# Patient Record
Sex: MALE | Race: WHITE | NOT HISPANIC OR LATINO | Employment: FULL TIME | URBAN - METROPOLITAN AREA
[De-identification: names, ages, dates, MRNs, and addresses within clinical notes are randomized per-mention and may not be internally consistent; named-entity substitution may affect disease eponyms.]

---
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
## 2019-01-02 RX ORDER — BUPROPION HYDROCHLORIDE 150 MG/1
TABLET ORAL
Qty: 30 TABLET | Refills: 3 | Status: SHIPPED | OUTPATIENT
Start: 2019-01-02 | End: 2019-08-30

## 2019-01-23 ENCOUNTER — OFFICE VISIT (OUTPATIENT)
Dept: PEDIATRICS | Facility: CLINIC | Age: 19
End: 2019-01-23
Payer: COMMERCIAL

## 2019-01-23 ENCOUNTER — PATIENT OUTREACH (OUTPATIENT)
Dept: CARE COORDINATION | Facility: CLINIC | Age: 19
End: 2019-01-23

## 2019-01-23 VITALS
WEIGHT: 177.8 LBS | TEMPERATURE: 97.6 F | HEIGHT: 71 IN | OXYGEN SATURATION: 97 % | SYSTOLIC BLOOD PRESSURE: 124 MMHG | DIASTOLIC BLOOD PRESSURE: 60 MMHG | HEART RATE: 88 BPM | BODY MASS INDEX: 24.89 KG/M2

## 2019-01-23 DIAGNOSIS — F90.9 ATTENTION DEFICIT HYPERACTIVITY DISORDER (ADHD), UNSPECIFIED ADHD TYPE: ICD-10-CM

## 2019-01-23 DIAGNOSIS — F60.1 SCHIZOID PERSONALITY DISORDER (H): Primary | ICD-10-CM

## 2019-01-23 DIAGNOSIS — F41.9 ANXIETY: ICD-10-CM

## 2019-01-23 DIAGNOSIS — F32.1 MODERATE MAJOR DEPRESSION (H): ICD-10-CM

## 2019-01-23 DIAGNOSIS — F34.1 DYSTHYMIA: ICD-10-CM

## 2019-01-23 DIAGNOSIS — F33.0 MILD EPISODE OF RECURRENT MAJOR DEPRESSIVE DISORDER (H): ICD-10-CM

## 2019-01-23 PROCEDURE — 99214 OFFICE O/P EST MOD 30 MIN: CPT | Performed by: INTERNAL MEDICINE

## 2019-01-23 RX ORDER — SERTRALINE HYDROCHLORIDE 100 MG/1
100 TABLET, FILM COATED ORAL DAILY
Qty: 30 TABLET | Refills: 3 | Status: SHIPPED | OUTPATIENT
Start: 2019-01-23 | End: 2019-01-23

## 2019-01-23 RX ORDER — QUETIAPINE FUMARATE 25 MG/1
25 TABLET, FILM COATED ORAL
Qty: 14 TABLET | Refills: 1 | Status: SHIPPED | OUTPATIENT
Start: 2019-01-23 | End: 2019-01-23

## 2019-01-23 RX ORDER — DEXTROAMPHETAMINE SACCHARATE, AMPHETAMINE ASPARTATE MONOHYDRATE, DEXTROAMPHETAMINE SULFATE AND AMPHETAMINE SULFATE 7.5; 7.5; 7.5; 7.5 MG/1; MG/1; MG/1; MG/1
30 CAPSULE, EXTENDED RELEASE ORAL DAILY
Qty: 30 CAPSULE | Refills: 0 | Status: SHIPPED | OUTPATIENT
Start: 2019-01-23 | End: 2019-08-30

## 2019-01-23 ASSESSMENT — ANXIETY QUESTIONNAIRES
1. FEELING NERVOUS, ANXIOUS, OR ON EDGE: SEVERAL DAYS
6. BECOMING EASILY ANNOYED OR IRRITABLE: MORE THAN HALF THE DAYS
3. WORRYING TOO MUCH ABOUT DIFFERENT THINGS: SEVERAL DAYS
IF YOU CHECKED OFF ANY PROBLEMS ON THIS QUESTIONNAIRE, HOW DIFFICULT HAVE THESE PROBLEMS MADE IT FOR YOU TO DO YOUR WORK, TAKE CARE OF THINGS AT HOME, OR GET ALONG WITH OTHER PEOPLE: SOMEWHAT DIFFICULT
5. BEING SO RESTLESS THAT IT IS HARD TO SIT STILL: NOT AT ALL
7. FEELING AFRAID AS IF SOMETHING AWFUL MIGHT HAPPEN: NOT AT ALL
GAD7 TOTAL SCORE: 7
2. NOT BEING ABLE TO STOP OR CONTROL WORRYING: SEVERAL DAYS

## 2019-01-23 ASSESSMENT — ACTIVITIES OF DAILY LIVING (ADL): DEPENDENT_IADLS:: INDEPENDENT

## 2019-01-23 ASSESSMENT — MIFFLIN-ST. JEOR: SCORE: 1835.69

## 2019-01-23 ASSESSMENT — PATIENT HEALTH QUESTIONNAIRE - PHQ9
SUM OF ALL RESPONSES TO PHQ QUESTIONS 1-9: 17
5. POOR APPETITE OR OVEREATING: MORE THAN HALF THE DAYS

## 2019-01-23 NOTE — PATIENT INSTRUCTIONS
1) Try the seroquel at before sleep    2) Get in with psychiatry and psychology as we discussed.    Recheck with me if symptoms worsening before appointment with them

## 2019-01-23 NOTE — TELEPHONE ENCOUNTER
Duplicate.     sertraline (ZOLOFT) 100 MG tablet was filled on 1/23/2019, qty 30 with 3 refills.     QUEtiapine (SEROQUEL) 25 MG tablet was filled on 1/23/2018, qty 14 with 1 refills.

## 2019-01-23 NOTE — PROGRESS NOTES
Clinic Care Coordination Contact    Clinic Care Coordination Contact  OUTREACH    Referral Information:  Referral Source: PCP  RN VIVIANA was requested to meet with patient while in clinic to see PCP for follow up appointment    Primary Diagnosis: Behavioral Health    Chief Complaint   Patient presents with     Clinic Care Coordination - Face To Face     coordination of mental health services        Universal Utilization: Concern for No-Show and non-adherence to medical care plan recommendations   Clinic Utilization  Difficulty keeping appointments:: No  Compliance Concerns: Yes  No-Show Concerns: No  No PCP office visit in Past Year: No  Utilization    Last refreshed: 1/23/2019  2:38 AM:  Hospital Admissions 0           Last refreshed: 1/23/2019  2:38 AM:  ED Visits 0           Last refreshed: 1/23/2019  2:38 AM:  No Show Count (past year) 5              Current as of: 1/23/2019  2:38 AM              Clinical Concerns:  Current Medical Concerns:  Patient presented to clinic for scheduled follow up of major depression/medicaiton assessment.   Patient disclosed to provider he has not established care (as recommended) for psychiatry medication management and has not been continuing to go to his therapist with [place] [facility]. Patient is in agreement to re-establish care with mental health providers for both therapy and medication management; currently, he is living with his parents and completing an online college program (19 credits) and working the overnight shift for [employer].     Current Behavioral Concerns: Affect somewhat flat; patient made good eye contact; was smiley and showed cole in talking about his college studies    Education Provided to patient: RN CC reinforced use of Crisis Line and educated patient that [facility] also has a mobile response unit    Psychosocial:  Mental health DX: Yes  Mental health DX how managed: Medication (agreeable to start therapy again)  Mental health management  concern (GOAL): Yes  Informal Support system::Parent     Financial/Insurance:   Financial/Insurance concerns (GOAL): No       Resources and Interventions:  RN CC facilitated call with patient in room to [facility] Providers, was able to establish the following appointments:     1. Psychiatry Medication Management      Wednesday January 30th, 2019 @ 2:00 PM       Jaky Lund      [place], [place]    2. Therapy      [facility]       Monday January 28th, 2019 @ noon      Gelacio Urena       Patient/Caregiver understanding: Patient verbalized understanding, engaged in AIDET communication behavior during encounter.    Outreach Frequency: monthly      Plan:   Patient has establish-care visits with both psychiatry and therapy for next week.   RN CC will outreach in follow up in 2-3 weeks; patient encouraged to call this writer sooner with any new questions/concerns.     Sydni Dean RN   Clinic Care Coordinator-Emily Guerrero  PH: [phone]

## 2019-01-23 NOTE — PROGRESS NOTES
SUBJECTIVE:   Wilfrido Echavarria is a 18 year old male who presents to clinic today for the following health issues:      Depression Followup    Status since last visit: Stable     See PHQ-9 for current symptoms.  Other associated symptoms: None    Complicating factors:   Significant life event:  No   Current substance abuse:  None  Anxiety or Panic symptoms:  No    PHQ 7/12/2018 9/25/2018 11/21/2018   PHQ-9 Total Score 8 13 13   Q9: Suicide Ideation Not at all Not at all Several days     GURMEET-7 SCORE 9/25/2018 11/21/2018 1/23/2019   Total Score 8 6 7           Patient notes that he has been feeling more stressed lately.  He is taking 19 credits from college remotely.  He notes that he is also working at [employer] night shift he tries to sleep but notes that sleep has been challenging.  This is been an ongoing issue for patient.  He is tried Unisom, trazodone and melatonin without help.  Of note he did have a suicide attempt at school last fall with trazodone.  He has not had trazodone right now.  He denies any concerns for suicidal ideation or homicidal ideation.  He denies access to guns or weapons.  He does not feel that decreasing workload is an option for him right now.    He did see a psychologist at [facility] before.  He never did see a psychiatrist.  He continues to be on Adderall 30 mg/day which notes that it helps him focus he is on Zoloft 100 mg/day with this as well.  PHQ-9  English  PHQ-9   Any Language  Suicide Assessment Five-step Evaluation and Treatment (SAFE-T)    Amount of exercise or physical activity: 6-7 days/week for an average of 45-60 minutes    Problems taking medications regularly: No    Medication side effects: none    Diet: regular (no restrictions)        Medication Followup of Adderall    Taking Medication as prescribed: yes    Side Effects:  None    Medication Helping Symptoms:  yes       Problem list and histories reviewed & adjusted, as indicated.  Additional history:  "        Reviewed and updated as needed this visit by clinical staff       Reviewed and updated as needed this visit by Provider         ROS:  Constitutional, HEENT, cardiovascular, pulmonary, GI, , musculoskeletal, neuro, skin, endocrine and psych systems are negative, except as otherwise noted.    OBJECTIVE:     /60 (BP Location: Right arm, Patient Position: Chair, Cuff Size: Adult Regular)   Pulse 88   Temp 97.6  F (36.4  C) (Tympanic)   Ht 1.791 m (5' 10.5\")   Wt 80.6 kg (177 lb 12.8 oz)   SpO2 97%   BMI 25.15 kg/m    Body mass index is 25.15 kg/m .  General patient appears fatigued he is well-groomed in no acute distress HEENT sclera clear mouth is moist pink oral mucosa respiratory breathing regular and unlabored.  Extremities warm and well perfused.  Neurologic no focal deficits no tremor noted.  Psych slightly flat affect slightly poor eye contact but really groomed, seems to have poor insight into current situation with increased workload as well as schooling.    Diagnostic Test Results:  none     ASSESSMENT/PLAN:       ICD-10-CM    1. Schizoid personality disorder (H) F60.1    2. Attention deficit hyperactivity disorder (ADHD), unspecified ADHD type F90.9 amphetamine-dextroamphetamine (ADDERALL XR) 30 MG 24 hr capsule   3. Dysthymia F34.1 MENTAL HEALTH REFERRAL  - Adult; Psychiatry and Medication Management, Outpatient Treatment; Individual/Couples/Family/Group Therapy/Health Psychology; Other: Behavioral Healthcare Providers [phone]; Psychiatry; Other: Behavioral Healthcare...     DISCONTINUED: sertraline (ZOLOFT) 100 MG tablet     DISCONTINUED: QUEtiapine (SEROQUEL) 25 MG tablet   4. Mild episode of recurrent major depressive disorder (H) F33.0      Contracted for safety with patient.  Discussed trial of Seroquel for help with sleep.  This may help with anxiety as well.  Gave only a very small supply as patient did try to overdose in the past on trazodone may refill after 14 days for " another 14-day supply if needed.  Did have care coordination come in and help with assessment of patient as well.  He was scheduled with psychology and psychiatry as noted in their note.  Discussed warning signs for recheck in clinic with patient as well.      Norman Andersen MD, MD  [facility]

## 2019-01-24 RX ORDER — QUETIAPINE FUMARATE 25 MG/1
TABLET, FILM COATED ORAL
Qty: 90 TABLET | Refills: 0 | Status: SHIPPED | OUTPATIENT
Start: 2019-01-24 | End: 2019-11-06

## 2019-01-24 RX ORDER — SERTRALINE HYDROCHLORIDE 100 MG/1
TABLET, FILM COATED ORAL
Qty: 90 TABLET | Refills: 0 | Status: SHIPPED | OUTPATIENT
Start: 2019-01-24 | End: 2019-08-30

## 2019-01-24 ASSESSMENT — ANXIETY QUESTIONNAIRES: GAD7 TOTAL SCORE: 7

## 2019-01-24 NOTE — TELEPHONE ENCOUNTER
Dispense amount changed to dispense 90 days per patient request  Tracie LLOYD RN - Triage  [facility]

## 2019-02-06 ENCOUNTER — PATIENT OUTREACH (OUTPATIENT)
Dept: CARE COORDINATION | Facility: CLINIC | Age: 19
End: 2019-02-06

## 2019-02-06 NOTE — LETTER
[place] [facility]ARE COORDINATION  [address] [place] [address]  February 6, 2019    Wilfrido ELLSWORTH Blood  [address] [address]      Dear Wilfrido,    I am a clinic care coordinator who works with Norman Andersen MD, MD at [facility]. I wanted to thank you for spending the time to talk with me!    I wanted to follow up and share with you some of the information we discussed. Enclosed you will find a couple of local career fair events occurring in February: there are many others coming up throughout the Spring as well! Some other strategies we discussed include creating an online job seeking account by posting your resume to places such as [employer]com or others, as well as consider making a LinkedIn profile.     You're doing a great job: it's encouraging that you've established care with some additional providers and together we will work to support you achieving and sustaining your health and wellness goals.    Please feel free to contact me at [phone], with any questions or concerns. We at [facility] are focused on providing you with the highest-quality healthcare experience possible and that all starts with you.     Sincerely,     Sydni Dean RN   Clinic Care Coordinator-[facility]  PH: [phone]    Enclosed: I have enclosed a copy of the Complex Care Plan. This has helpful information and goals that we have talked about. Please keep this in an easy to access place to use as needed.

## 2019-02-06 NOTE — LETTER
[facility]  Complex Care Plan  About Me  Patient Name:  Wilfrido Echavarria    YOB: 2000  Age:     18 year old   [facility] MRN:   [id number] Telephone Information:  Home Phone [phone]   Mobile [phone]       Address:    [address] [place] [address] Email address:  No e-mail address on record      Emergency Contact(s)  Name Relationship Lgl Grd Work Phone Home Phone Mobile Phone   1Lyn MENCHACA* Mother Yes  [phone]           Primary language:  English     needed? No   [facility]:  [phone] op. 1  Other communication barriers: None  Preferred Method of Communication:  Mail  Current living arrangement:    Mobility Status/ Medical Equipment:      Health Maintenance  Health Maintenance Reviewed:      My Access Plan  Medical Emergency 911   Primary Clinic Line [facility] [place]  [phone]   24 Hour Appointment Line [phone] or  [phone]) (toll-free)   24 Hour Nurse Line [phone] (toll-free)   Preferred Urgent Care     Preferred Hospital     Preferred Pharmacy [facility] [address] [place], [place] - [address] AT [place] [address] & [address]     Behavioral Health Crisis Line The National Suicide Prevention Lifeline at 1-579.932.2024 or 911     My Care Team Members    Patient Care Team       Relationship Specialty Notifications Start End    Norman Andersen MD PCP - General Internal Medicine  11/27/16     Phone: [phone] Fax: [phone]         [address] [place] [address] [address] [place] [id number]    Norman Andersen MD PCP - Assigned PCP   2/13/17     Phone: [phone] Fax: [phone]         [address] [place] [address] [address] [place] [id number]    Sydni Dean, RN Lead Care Coordinator Primary Care - CC  1/30/19             My Care Plans  Self Management and Treatment Plan  Goals and (Comments)  Goals        General    Other (pt-stated)     Notes - Note created  2/6/2019   3:37 PM by Sydni Dean RN    Goal Statement: I want to achieve an improved sleep pattern so that I can get at least 6 hours of uninterrupted sleep and feel more restful and focused  Measure of Success: Patient stated ability to fall asleep and stay asleep consistently for at least a 6 hour interval of time  Supportive Steps to Achieve: I will consider looking for employment that allows me to work a day shift versus an overnight shift      I will discuss sleep medication strategies with my medical providers  Barriers: Patient currently works overnight shift and is also completing online school, which has caused disruption in consistent sleep pattern  Strengths: Engaged in healthcare decisions, receptive to RN CC outreach and support, willing to make lifestyle changes (including alternative employment)  Date to Achieve By: 3 months: May 6th, 2019                               Care Coordination Start Date: 2/6/2019   Frequency of Care Coordination: monthly   Form Last Updated: 02/06/2019

## 2019-02-06 NOTE — PROGRESS NOTES
"Clinic Care Coordination Contact    Clinic Care Coordination Contact  OUTREACH    Referral Information:  Referred by PCP for assistance in establishing and navigating follow up to support mental wellness    Primary Diagnosis: Behavioral Health    Chief Complaint   Patient presents with     Clinic Care Coordination - Follow-up     mental health referral        Universal Utilization:   Clinic Utilization  Difficulty keeping appointments: No  Compliance Concerns: Yes  No-Show Concerns: No  No PCP office visit in Past Year: No  Utilization    Last refreshed: 1/25/2019 10:56 AM:  Hospital Admissions 0           Last refreshed: 1/25/2019 10:56 AM:  ED Visits 0           Last refreshed: 1/25/2019 10:56 AM:  No Show Count (past year) 5              Current as of: 1/25/2019 10:56 AM              Clinical Concerns:  Current Medical Concerns:    RN CC initially met with patient face to face during scheduled office visit with goal of establishing mental health follow up care; RN CC outreached in follow up to assess outcome of referrals; the patient indicates he was able to complete his establish-care visit with Jaky Lund (at [facility] for medication management) but offers \"it was kind of weird, she wasn't so sure why I was there, but it (the visit) did go ok. She increased my Zoloft for now and the other medications (Wellbutrin and Adderall) will stay the same\". The patient reports genesight testing was presented to him, and he would love to pursue this, but it is not presently covered by his insurance company and the private pay cost is $1,000.00 which is not financially feasible.    The patient is reporting he continues to have very poor sleep, citing that he works the overnight shift at [employer], and is scheduled to end his shift around 0330 but lately it has extended far beyond that to 0500 or 0600 AM, and then he sleeps from \"about noon until 2 or so\"; the patient offers falling asleep is not an issue, but " "staying asleep is difficult, which in turn, has caused \"focus issues with my school work, even with the Adderall\".       RN CC inquired if patient would consider altering his work schedule to the daytime shift, and patient indicated he would do this, but his current employer is not willing to change his shifts, as he is one of the newest employees.  RN CC discussed strategies to look for new employment: stating there are career fairs coming up, encouraged the patient to create a resume and LinkedIn profile and market his skill set in hopes of achieving employment that is better suited to a routine that promotes effective sleep and focus for school work. Patient was receptive and appreciative of the discussion.      Regarding establishment of therapy appointment, the patient states that he missed his appointment at [facility] but has rescheduled for Friday, February 8th @ 2:00 pm.     Current Behavioral Concerns:   Affect bright, patient cheerful and interactive; very thankful for the outreach call      Medication Management:  Patient reports he recently saw Chichi Lund at [facility] for medication management: she increased his Zoloft and he remains on the same dose of Wellbutrin and Adderall; the patient is also prescribed Seroquel, but went on to state \"I was prescribed that to help promote sleep but I've been reading the side effects and quite honestly, I am hesitant to start it\".  RN CC encouraged patient to discuss this at his next follow up with PCP and/or Chichi Lund (psychiatry medication management provider); in the interim, RN CC educated patient that if he does decide to start the medicatoin, it is important that he not abruptly stop, and rather if he wishes to stop any of his medications, he should contact the ordering provider for discussion and advice on tapering strategies. Patient was receptive and verbalized understanding and agreement regarding this discussion.       Goals: "   Goals        General    Other (pt-stated)     Notes - Note created  2/6/2019  3:37 PM by Sydni Dean RN    Goal Statement: I want to achieve an improved sleep pattern so that I can get at least 6 hours of uninterrupted sleep and feel more restful and focused  Measure of Success: Patient stated ability to fall asleep and stay asleep consistently for at least a 6 hour interval of time  Supportive Steps to Achieve: I will consider looking for employment that allows me to work a day shift versus an overnight shift      I will discuss sleep medication strategies with my medical providers  Barriers: Patient currently works overnight shift and is also completing online school, which has caused disruption in consistent sleep pattern  Strengths: Engaged in healthcare decisions, receptive to RN CC outreach and support, willing to make lifestyle changes (including alternative employment)  Date to Achieve By: 3 months: May 6th, 2019              As of today's date 2/6/2019 goal is met at 0 - 25%.   Goal Status:  Active    Patient/Caregiver understanding: Patient verbalized understanding, engaged in AIDET communication behavior during encounter.    Outreach Frequency: monthly      Plan:   RN CC will mail Introduction to Care Coordination letter and will include Complex Care Plan   RN CC will also include material for patient to review regarding area job fairs: there is one on February 12th and February 20th  Patient encouraged to discuss sleep medication strategies at follow up office visit  RN CC will outreach again in follow up in 3-4 weeks; patient encouraged to call sooner with questions or concerns.     Sydni Dean RN   Clinic Care Coordinator-[facility]  PH: [phone]

## 2019-03-07 ENCOUNTER — PATIENT OUTREACH (OUTPATIENT)
Dept: CARE COORDINATION | Facility: CLINIC | Age: 19
End: 2019-03-07

## 2019-03-07 NOTE — PROGRESS NOTES
Clinic Care Coordination Contact  [facility]/Voicemail    RN CC follow-up outreach: update on mental health resources/providers, job and school status.      Clinical Data: Care Coordinator Outreach  Outreach attempted x 1.  Left message on voicemail with call back information and requested return call.  Plan: Care Coordinator will try to reach patient again in 3-5 business days.    Sydni Dean RN   Clinic Care Coordinator-[facility]  PH: [phone]

## 2019-03-15 NOTE — PROGRESS NOTES
Clinic Care Coordination Contact  [facility]/Voicemail       Clinical Data: Care Coordinator Outreach  Outreach attempted x 2.  Left message on voicemail with call back information and requested return call.  Plan: Care Coordinator mailed out care coordination introduction letter on 02/06/2019. Care Coordinator will try to reach patient again in 10-14 business days. If no response at that time, will close to Care Coordination.    Sydni Dean RN   [facility]  PH: [phone]

## 2019-04-02 ENCOUNTER — OFFICE VISIT (OUTPATIENT)
Dept: URGENT CARE | Facility: URGENT CARE | Age: 19
End: 2019-04-02
Payer: COMMERCIAL

## 2019-04-02 VITALS
DIASTOLIC BLOOD PRESSURE: 90 MMHG | SYSTOLIC BLOOD PRESSURE: 122 MMHG | BODY MASS INDEX: 25.04 KG/M2 | WEIGHT: 177 LBS | RESPIRATION RATE: 20 BRPM | HEART RATE: 89 BPM | OXYGEN SATURATION: 98 % | TEMPERATURE: 98.4 F

## 2019-04-02 DIAGNOSIS — J01.90 ACUTE SINUSITIS WITH COEXISTING CONDITION, NEED PROPHYLACTIC TREATMENT: Primary | ICD-10-CM

## 2019-04-02 PROCEDURE — 99213 OFFICE O/P EST LOW 20 MIN: CPT | Performed by: FAMILY MEDICINE

## 2019-04-02 RX ORDER — AZITHROMYCIN 250 MG/1
TABLET, FILM COATED ORAL
Qty: 6 TABLET | Refills: 0 | Status: SHIPPED | OUTPATIENT
Start: 2019-04-02 | End: 2019-04-07

## 2019-04-02 NOTE — LETTER
[facility]  [address]  [address]  [place] [address]  Phone: [phone]  Fax: [phone]    April 2, 2019        Wilfrido ELLSWORTH Blood  [address] [address]          To whom it may concern:    RE: Wilfrido ELLSWORTH Blood    Patient was seen and treated today at our clinic and missed work. Unable to work on 4/1 and 4/2/19.    Please contact me for questions or concerns.      Sincerely,        Ellis Balbuena MD

## 2019-04-03 NOTE — PROGRESS NOTES
SUBJECTIVE: Wilfrido Echavarria is a 18 year old male patient complaining of sinus congestion for many day(s).     OBJECTIVE: The patient appears alert and moderate distress.   EARS: External ears normal. Canals clear. TM's normal.  NOSE/SINUS: positive findings: mucosa erythematous and swollen  Sinus palpation: Maxillary sinus tender to palpation   THROAT: moderate erythema   NECK:positive findings: moderate anterior cervical nodes   CHEST: Clear    ASSESSMENT: Acute Sinusitis    PLAN: See orders.   In addition, I have suggested that the patient   Push fluids.

## 2019-04-08 NOTE — PROGRESS NOTES
Clinic Care Coordination Contact  [facility]/Voicemail       Clinical Data: Care Coordinator Outreach  Outreach attempted x 3.  Left message on voicemail with call back information and requested return call.  Plan: Care Coordinator mailed out care coordination introduction letter on 02/06/2019. Care Coordinator will do no further outreaches at this time.    Sydni Dean RN   Clinic Care Coordinator-[facility]  PH: [phone]

## 2019-08-30 ENCOUNTER — OFFICE VISIT (OUTPATIENT)
Dept: FAMILY MEDICINE | Facility: CLINIC | Age: 19
End: 2019-08-30
Payer: COMMERCIAL

## 2019-08-30 VITALS
DIASTOLIC BLOOD PRESSURE: 68 MMHG | WEIGHT: 199.3 LBS | BODY MASS INDEX: 28.19 KG/M2 | TEMPERATURE: 98.2 F | HEART RATE: 84 BPM | SYSTOLIC BLOOD PRESSURE: 110 MMHG | RESPIRATION RATE: 16 BRPM

## 2019-08-30 DIAGNOSIS — M22.2X1 PATELLOFEMORAL SYNDROME OF RIGHT KNEE: Primary | ICD-10-CM

## 2019-08-30 DIAGNOSIS — F90.9 ATTENTION DEFICIT HYPERACTIVITY DISORDER (ADHD), UNSPECIFIED ADHD TYPE: ICD-10-CM

## 2019-08-30 DIAGNOSIS — F34.1 DYSTHYMIA: ICD-10-CM

## 2019-08-30 DIAGNOSIS — F33.0 MILD EPISODE OF RECURRENT MAJOR DEPRESSIVE DISORDER (H): ICD-10-CM

## 2019-08-30 PROCEDURE — 99214 OFFICE O/P EST MOD 30 MIN: CPT | Performed by: NURSE PRACTITIONER

## 2019-08-30 RX ORDER — SERTRALINE HYDROCHLORIDE 100 MG/1
100 TABLET, FILM COATED ORAL DAILY
Qty: 30 TABLET | Refills: 2 | Status: SHIPPED | OUTPATIENT
Start: 2019-08-30 | End: 2019-08-30

## 2019-08-30 RX ORDER — BUPROPION HYDROCHLORIDE 150 MG/1
TABLET ORAL
Qty: 30 TABLET | Refills: 2 | Status: SHIPPED | OUTPATIENT
Start: 2019-08-30 | End: 2019-08-30

## 2019-08-30 RX ORDER — DEXTROAMPHETAMINE SACCHARATE, AMPHETAMINE ASPARTATE MONOHYDRATE, DEXTROAMPHETAMINE SULFATE AND AMPHETAMINE SULFATE 7.5; 7.5; 7.5; 7.5 MG/1; MG/1; MG/1; MG/1
30 CAPSULE, EXTENDED RELEASE ORAL DAILY
Qty: 30 CAPSULE | Refills: 0 | Status: SHIPPED | OUTPATIENT
Start: 2019-08-30 | End: 2022-11-21

## 2019-08-30 ASSESSMENT — ANXIETY QUESTIONNAIRES
2. NOT BEING ABLE TO STOP OR CONTROL WORRYING: NOT AT ALL
GAD7 TOTAL SCORE: 2
4. TROUBLE RELAXING: MORE THAN HALF THE DAYS
3. WORRYING TOO MUCH ABOUT DIFFERENT THINGS: NOT AT ALL
GAD7 TOTAL SCORE: 2
7. FEELING AFRAID AS IF SOMETHING AWFUL MIGHT HAPPEN: NOT AT ALL
1. FEELING NERVOUS, ANXIOUS, OR ON EDGE: NOT AT ALL
5. BEING SO RESTLESS THAT IT IS HARD TO SIT STILL: NOT AT ALL
7. FEELING AFRAID AS IF SOMETHING AWFUL MIGHT HAPPEN: NOT AT ALL
GAD7 TOTAL SCORE: 2
6. BECOMING EASILY ANNOYED OR IRRITABLE: NOT AT ALL

## 2019-08-30 ASSESSMENT — PATIENT HEALTH QUESTIONNAIRE - PHQ9
SUM OF ALL RESPONSES TO PHQ QUESTIONS 1-9: 11
10. IF YOU CHECKED OFF ANY PROBLEMS, HOW DIFFICULT HAVE THESE PROBLEMS MADE IT FOR YOU TO DO YOUR WORK, TAKE CARE OF THINGS AT HOME, OR GET ALONG WITH OTHER PEOPLE: SOMEWHAT DIFFICULT
SUM OF ALL RESPONSES TO PHQ QUESTIONS 1-9: 11

## 2019-08-30 NOTE — NURSING NOTE
"Chief Complaint   Patient presents with     Depression     Initial /68 (BP Location: Right arm, Patient Position: Sitting, Cuff Size: Adult Large)   Pulse 84   Temp 98.2  F (36.8  C) (Oral)   Resp 16   Wt 90.4 kg (199 lb 4.8 oz)   BMI 28.19 kg/m   Estimated body mass index is 28.19 kg/m  as calculated from the following:    Height as of 1/23/19: 1.791 m (5' 10.5\").    Weight as of this encounter: 90.4 kg (199 lb 4.8 oz).  BP completed using cuff size large right arm    Lisa Magill, CMA    "

## 2019-08-30 NOTE — PROGRESS NOTES
Subjective     Wilfrido Echavarria is a 19 year old male who presents to clinic today for the following health issues:    History of Present Illness        Mental Health Follow-up:  Patient presents to follow-up on Depression.Patient's depression since last visit has been:  Good  The patient is not having other symptoms associated with depression.      Any significant life events: relationship concerns and job concerns  Patient is not feeling anxious or having panic attacks.  Patient has no concerns about alcohol or drug use.     Social History  Tobacco Use    Smoking status: Never Smoker    Smokeless tobacco: Never Used  Alcohol use: No  Drug use: No      Today's PHQ-9         PHQ-9 Total Score:     (P) 11   PHQ-9 Q9 Thoughts of better off dead/self-harm past 2 weeks :   (P) Not at all   Thoughts of suicide or self harm:      Self-harm Plan:        Self-harm Action:          Safety concerns for self or others:         Last seen by his PCP in January and he was referred to psych for med management.     He went to [facility] once and didn't like the provider he saw there.   There are notes in his chart that mention at the [place] appt his zoloft dose was increased, but he reports he never increased his dose.  He has been off meds since early this year.   He stopped his medications because he was no longer in school and his work was fast paced.   He is now working at [employer] working in the [employer] and had an interview for the [employer] last week.   Took last year off school, but is now back in school for IT.    Started school last week and having a hard time focusing.   Day dreaming a lot.   He reports focus was much improved when he was taking adderall.  He could actually finish his homework.   Mood is stable, feeling really good for him.    He denies SI or concerns for his safety or the safety of others.   No issues with anxiety.   No longer doing therapy; found this helpful but doesn't have time to do this  right now.   Sleep has always been an issue for him.    In the past had a suicide attempt with trazodone.   His PCP wanted him to try a low dose of seroquel for sleep, but he read about side effects and found those to be too concerning and never tried it.    R knee has been bothering him.   Running, bending, and walking downstairs are painful.  Really painful when he does a leg workout and likes to do squat 300+ pounds.       GURMEET-7 SCORE 11/21/2018 1/23/2019 8/30/2019   Total Score - - 2 (minimal anxiety)   Total Score 6 7 2     PHQ-9 SCORE 11/21/2018 1/23/2019 8/30/2019   PHQ-9 Total Score MyChart - - 11 (Moderate depression)   PHQ-9 Total Score 13 17 11               Current Outpatient Medications   Medication Sig Dispense Refill     amphetamine-dextroamphetamine (ADDERALL XR) 30 MG 24 hr capsule Take 1 capsule (30 mg) by mouth daily 30 capsule 0     buPROPion (WELLBUTRIN XL) 150 MG 24 hr tablet TAKE 1 TABLET(150 MG) BY MOUTH EVERY MORNING 30 tablet 2     Multiple Vitamins-Minerals (MULTIVITAMIN PO)        Omega-3 Fatty Acids (FISH OIL PO)        sertraline (ZOLOFT) 100 MG tablet Take 1 tablet (100 mg) by mouth daily Take 1/2 tab x 1 week, then start one tab daily 30 tablet 2     QUEtiapine (SEROQUEL) 25 MG tablet TAKE 1 TABLET(25 MG) BY MOUTH EVERY NIGHT AS NEEDED FOR INSOMNIA (Patient not taking: Reported on 8/30/2019) 90 tablet 0     No Known Allergies      Reviewed and updated as needed this visit by Provider         Review of Systems   ROS COMP: Constitutional, HEENT, cardiovascular, pulmonary, gi and gu and psych systems are negative, except as otherwise noted.      Objective    /68 (BP Location: Right arm, Patient Position: Sitting, Cuff Size: Adult Large)   Pulse 84   Temp 98.2  F (36.8  C) (Oral)   Resp 16   Wt 90.4 kg (199 lb 4.8 oz)   BMI 28.19 kg/m    Body mass index is 28.19 kg/m .  Physical Exam   GENERAL: healthy, alert and no distress  NECK: no adenopathy, no asymmetry, masses, or scars  and thyroid normal to palpation  RESP: lungs clear to auscultation - no rales, rhonchi or wheezes  CV: regular rate and rhythm, normal S1 S2, no S3 or S4, no murmur, click or rub, no peripheral edema and peripheral pulses strong  MS: no gross musculoskeletal defects noted, no edema, R knee: FROM, no tenderness or swelling   SKIN: no suspicious lesions or rashes  PSYCH: mentation appears normal, affect normal/bright, judgement and insight intact and appearance well groomed    Diagnostic Test Results:  none         Assessment & Plan     1. Dysthymia  Restarting today.   - sertraline (ZOLOFT) 100 MG tablet; Take 1 tablet (100 mg) by mouth daily Take 1/2 tab x 1 week, then start one tab daily  Dispense: 30 tablet; Refill: 2    2. Mild episode of recurrent major depressive disorder (H)  Restarting today. Consider getting back in therapy.  Discussed trying seroquel for sleep, but seems hesitant.  Will see if sleep improves when he is back on medications.   - buPROPion (WELLBUTRIN XL) 150 MG 24 hr tablet; TAKE 1 TABLET(150 MG) BY MOUTH EVERY MORNING  Dispense: 30 tablet; Refill: 2    3. Attention deficit hyperactivity disorder (ADHD), unspecified ADHD type   reviewed; last refilled in January.  Discussed need for same provider for refills.  Will need to do controlled substance agreement.   - amphetamine-dextroamphetamine (ADDERALL XR) 30 MG 24 hr capsule; Take 1 capsule (30 mg) by mouth daily  Dispense: 30 capsule; Refill: 0    4. Patellofemoral syndrome of right knee  Rest, ice, NSAIDs.           Return in about 3 months (around 11/30/2019) for Mental Health Follow up.    SELMA Ochoa [facility]

## 2019-08-31 ASSESSMENT — ANXIETY QUESTIONNAIRES: GAD7 TOTAL SCORE: 2

## 2019-09-03 NOTE — TELEPHONE ENCOUNTER
"Requested Prescriptions   Pending Prescriptions Disp Refills     sertraline (ZOLOFT) 100 MG tablet [Pharmacy Med Name: SERTRALINE 100MG TABLETS] 90 tablet 2     Sig: TAKE 1/2 TO BY MOUTH EVERY DAY FOR 1 WEEK, THEN INCREASE TO 1 FULL TABLET DAILY   Last Written Prescription Date:  8/30/19  Last Fill Quantity: 30,  # refills: 2   Last Office Visit: 8/30/2019 Strong      Return in about 3 months (around 11/30/2019) for Mental Health Follow up.     Future Office Visit:         SSRIs Protocol Failed - 8/30/2019  4:49 PM        Failed - PHQ-9 score less than 5 in past 6 months     PHQ-9 SCORE 11/21/2018 1/23/2019 8/30/2019   PHQ-9 Total Score MyChart - - 11 (Moderate depression)   PHQ-9 Total Score 13 17 11     GURMEET-7 SCORE 11/21/2018 1/23/2019 8/30/2019   Total Score - - 2 (minimal anxiety)   Total Score 6 7 2               Passed - Medication is Bupropion     If the medication is Bupropion (Wellbutrin), and the patient is taking for smoking cessation; OK to refill.          Passed - Medication is active on med list        Passed - Patient is age 18 or older        Passed - Recent (6 mo) or future (30 days) visit within the authorizing provider's specialty     Patient had office visit in the last 6 months or has a visit in the next 30 days with authorizing provider or within the authorizing provider's specialty.  See \"Patient Info\" tab in inbasket, or \"Choose Columns\" in Meds & Orders section of the refill encounter.       ________________________________________________________________________       buPROPion (WELLBUTRIN XL) 150 MG 24 hr tablet [Pharmacy Med Name: BUPROPION XL 150MG TABLETS (24 H)] 90 tablet 2     Sig: TAKE 1 TABLET(150 MG) BY MOUTH EVERY MORNING   Last Written Prescription Date:  8/30/19  Last Fill Quantity: 30,  # refills: 2   Last Office Visit: 8/30/2019   Future Office Visit:         SSRIs Protocol Failed - 8/30/2019  4:49 PM        Failed - PHQ-9 score less than 5 in past 6 months     PHQ-9 SCORE " "11/21/2018 1/23/2019 8/30/2019   PHQ-9 Total Score MyChart - - 11 (Moderate depression)   PHQ-9 Total Score 13 17 11     GURMEET-7 SCORE 11/21/2018 1/23/2019 8/30/2019   Total Score - - 2 (minimal anxiety)   Total Score 6 7 2               Passed - Medication is Bupropion     If the medication is Bupropion (Wellbutrin), and the patient is taking for smoking cessation; OK to refill.          Passed - Medication is active on med list        Passed - Patient is age 18 or older        Passed - Recent (6 mo) or future (30 days) visit within the authorizing provider's specialty     Patient had office visit in the last 6 months or has a visit in the next 30 days with authorizing provider or within the authorizing provider's specialty.  See \"Patient Info\" tab in inbasket, or \"Choose Columns\" in Meds & Orders section of the refill encounter.            "

## 2019-09-04 RX ORDER — BUPROPION HYDROCHLORIDE 150 MG/1
TABLET ORAL
Qty: 90 TABLET | Refills: 0 | Status: SHIPPED | OUTPATIENT
Start: 2019-09-04 | End: 2019-11-06

## 2019-09-04 RX ORDER — SERTRALINE HYDROCHLORIDE 100 MG/1
TABLET, FILM COATED ORAL
Qty: 90 TABLET | Refills: 0 | Status: SHIPPED | OUTPATIENT
Start: 2019-09-04 | End: 2019-11-06

## 2019-09-04 NOTE — TELEPHONE ENCOUNTER
Pt requesting 90 day supply of meds  Had also requested 2 refills which I removed since he is due back in 3 months    Vanda French RN, BS  Clinical Nurse Triage.

## 2019-11-06 ENCOUNTER — OFFICE VISIT (OUTPATIENT)
Dept: FAMILY MEDICINE | Facility: CLINIC | Age: 19
End: 2019-11-06
Payer: COMMERCIAL

## 2019-11-06 VITALS
HEIGHT: 71 IN | SYSTOLIC BLOOD PRESSURE: 110 MMHG | HEART RATE: 71 BPM | TEMPERATURE: 98.5 F | DIASTOLIC BLOOD PRESSURE: 68 MMHG | WEIGHT: 198 LBS | OXYGEN SATURATION: 99 % | RESPIRATION RATE: 16 BRPM | BODY MASS INDEX: 27.72 KG/M2

## 2019-11-06 DIAGNOSIS — G89.29 CHRONIC PAIN OF RIGHT KNEE: Primary | ICD-10-CM

## 2019-11-06 DIAGNOSIS — F90.9 ATTENTION DEFICIT HYPERACTIVITY DISORDER (ADHD), UNSPECIFIED ADHD TYPE: ICD-10-CM

## 2019-11-06 DIAGNOSIS — M25.561 CHRONIC PAIN OF RIGHT KNEE: Primary | ICD-10-CM

## 2019-11-06 DIAGNOSIS — F32.1 MODERATE MAJOR DEPRESSION (H): ICD-10-CM

## 2019-11-06 PROCEDURE — 99214 OFFICE O/P EST MOD 30 MIN: CPT | Performed by: NURSE PRACTITIONER

## 2019-11-06 RX ORDER — DEXTROAMPHETAMINE SACCHARATE, AMPHETAMINE ASPARTATE MONOHYDRATE, DEXTROAMPHETAMINE SULFATE AND AMPHETAMINE SULFATE 7.5; 7.5; 7.5; 7.5 MG/1; MG/1; MG/1; MG/1
30 CAPSULE, EXTENDED RELEASE ORAL DAILY
Qty: 30 CAPSULE | Refills: 0 | Status: SHIPPED | OUTPATIENT
Start: 2019-12-07 | End: 2020-01-06

## 2019-11-06 RX ORDER — DEXTROAMPHETAMINE SACCHARATE, AMPHETAMINE ASPARTATE MONOHYDRATE, DEXTROAMPHETAMINE SULFATE AND AMPHETAMINE SULFATE 7.5; 7.5; 7.5; 7.5 MG/1; MG/1; MG/1; MG/1
30 CAPSULE, EXTENDED RELEASE ORAL DAILY
Qty: 30 CAPSULE | Refills: 0 | Status: CANCELLED | OUTPATIENT
Start: 2019-11-06

## 2019-11-06 RX ORDER — DEXTROAMPHETAMINE SACCHARATE, AMPHETAMINE ASPARTATE MONOHYDRATE, DEXTROAMPHETAMINE SULFATE AND AMPHETAMINE SULFATE 7.5; 7.5; 7.5; 7.5 MG/1; MG/1; MG/1; MG/1
30 CAPSULE, EXTENDED RELEASE ORAL DAILY
Qty: 30 CAPSULE | Refills: 0 | Status: SHIPPED | OUTPATIENT
Start: 2019-11-06 | End: 2019-12-06

## 2019-11-06 ASSESSMENT — PATIENT HEALTH QUESTIONNAIRE - PHQ9: SUM OF ALL RESPONSES TO PHQ QUESTIONS 1-9: 0

## 2019-11-06 ASSESSMENT — MIFFLIN-ST. JEOR: SCORE: 1922.31

## 2019-11-06 NOTE — PROGRESS NOTES
Subjective     Wilfrido Echavarria is a 19 year old male who presents to clinic today for the following health issues:    HPI   Medication Followup of Adderall    Taking Medication as prescribed: yes    Side Effects:  None    Medication Helping Symptoms:  yes   Last seen in the clinic in August.  Adderall was last filled on 8/31/19 with a 30 day supply. He never takes adderall on the wknds and only when he has school.  He had to be off adderall for a time period in order to enlist with  and is now able to restart medication again.  He is leaving Jan 1, 2020 for [place]; he is joining the [employer].  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  Denies side effects to adderall.  He feels mood is well controlled; no longer on medication.  He reports anxiety has been absent.  No concerns about sleep.  He denies SI/HI and substance abuse.     Continues to have pain in the R knee.  Stopped lifting/squatting.  He has been doing stair stepper.  Pain is worse if he pushes on his knee cap.        Current Outpatient Medications   Medication Sig Dispense Refill     amphetamine-dextroamphetamine (ADDERALL XR) 30 MG 24 hr capsule Take 1 capsule (30 mg) by mouth daily 30 capsule 0     [START ON 12/7/2019] amphetamine-dextroamphetamine (ADDERALL XR) 30 MG 24 hr capsule Take 1 capsule (30 mg) by mouth daily 30 capsule 0     amphetamine-dextroamphetamine (ADDERALL XR) 30 MG 24 hr capsule Take 1 capsule (30 mg) by mouth daily 30 capsule 0     Multiple Vitamins-Minerals (MULTIVITAMIN PO)        Omega-3 Fatty Acids (FISH OIL PO)        No Known Allergies    Reviewed and updated as needed this visit by Provider  Meds         Review of Systems   ROS COMP: Constitutional, HEENT, cardiovascular, pulmonary, gi and gu and psych systems are negative, except as otherwise noted.      Objective    /68 (BP Location: Right arm, Patient Position: Sitting, Cuff Size: Adult Large)   Pulse 71   Temp 98.5  F (36.9  C) (Oral)   Resp 16   Ht  "1.791 m (5' 10.5\")   Wt 89.8 kg (198 lb)   SpO2 99%   BMI 28.01 kg/m    Body mass index is 28.01 kg/m .  Physical Exam   GENERAL: healthy, alert and no distress  EYES: Eyes grossly normal to inspection, PERRL and conjunctivae and sclerae normal  NECK: no adenopathy, no asymmetry, masses, or scars and thyroid normal to palpation  RESP: lungs clear to auscultation - no rales, rhonchi or wheezes  CV: regular rate and rhythm, normal S1 S2, no S3 or S4, no murmur, click or rub  ABDOMEN: soft, nontender, no hepatosplenomegaly, no masses and bowel sounds normal  MS: no gross musculoskeletal defects noted  PSYCH: mentation appears normal, affect normal/bright      Diagnostic Test Results:  Labs reviewed in Epic        Assessment & Plan     1. Attention deficit hyperactivity disorder (ADHD), unspecified ADHD type   reviewed.  Refilled for him today.  Will be in joining the  in Jan and living in [place] for at least a year.  Will need local provider to provide next set of refills.   - amphetamine-dextroamphetamine (ADDERALL XR) 30 MG 24 hr capsule; Take 1 capsule (30 mg) by mouth daily  Dispense: 30 capsule; Refill: 0  - amphetamine-dextroamphetamine (ADDERALL XR) 30 MG 24 hr capsule; Take 1 capsule (30 mg) by mouth daily  Dispense: 30 capsule; Refill: 0    2. Chronic pain of right knee  Refer.   - ORTHO  REFERRAL    3. Moderate major depression (H)  He decided to stop medications.  Reports mood feels well controlled.  Updated PHQ today.  PHQ-9 SCORE 1/23/2019 8/30/2019 11/6/2019   PHQ-9 Total Score MyChart - 11 (Moderate depression) -   PHQ-9 Total Score 17 11 0              Return in about 3 months (around 2/6/2020) for adhd .--if he is still in [place]     SELMA Ochoa [facility]"

## 2020-03-02 ENCOUNTER — OFFICE VISIT (OUTPATIENT)
Dept: FAMILY MEDICINE | Facility: CLINIC | Age: 20
End: 2020-03-02
Payer: COMMERCIAL

## 2020-03-02 VITALS
HEART RATE: 66 BPM | OXYGEN SATURATION: 99 % | BODY MASS INDEX: 27.16 KG/M2 | TEMPERATURE: 98.1 F | DIASTOLIC BLOOD PRESSURE: 76 MMHG | WEIGHT: 192 LBS | SYSTOLIC BLOOD PRESSURE: 120 MMHG | RESPIRATION RATE: 12 BRPM

## 2020-03-02 DIAGNOSIS — S29.011A PECTORALIS MUSCLE RUPTURE, INITIAL ENCOUNTER: ICD-10-CM

## 2020-03-02 DIAGNOSIS — F90.9 ATTENTION DEFICIT HYPERACTIVITY DISORDER (ADHD), UNSPECIFIED ADHD TYPE: Primary | ICD-10-CM

## 2020-03-02 DIAGNOSIS — F32.1 MODERATE MAJOR DEPRESSION (H): ICD-10-CM

## 2020-03-02 PROCEDURE — 99214 OFFICE O/P EST MOD 30 MIN: CPT | Performed by: FAMILY MEDICINE

## 2020-03-02 RX ORDER — DEXTROAMPHETAMINE SACCHARATE, AMPHETAMINE ASPARTATE MONOHYDRATE, DEXTROAMPHETAMINE SULFATE AND AMPHETAMINE SULFATE 7.5; 7.5; 7.5; 7.5 MG/1; MG/1; MG/1; MG/1
30 CAPSULE, EXTENDED RELEASE ORAL DAILY
Qty: 30 CAPSULE | Refills: 0 | Status: SHIPPED | OUTPATIENT
Start: 2020-03-02 | End: 2020-04-01

## 2020-03-02 RX ORDER — DEXTROAMPHETAMINE SACCHARATE, AMPHETAMINE ASPARTATE MONOHYDRATE, DEXTROAMPHETAMINE SULFATE AND AMPHETAMINE SULFATE 7.5; 7.5; 7.5; 7.5 MG/1; MG/1; MG/1; MG/1
30 CAPSULE, EXTENDED RELEASE ORAL DAILY
Qty: 30 CAPSULE | Refills: 0 | Status: SHIPPED | OUTPATIENT
Start: 2020-04-02 | End: 2020-05-02

## 2020-03-02 RX ORDER — DEXTROAMPHETAMINE SACCHARATE, AMPHETAMINE ASPARTATE MONOHYDRATE, DEXTROAMPHETAMINE SULFATE AND AMPHETAMINE SULFATE 7.5; 7.5; 7.5; 7.5 MG/1; MG/1; MG/1; MG/1
30 CAPSULE, EXTENDED RELEASE ORAL DAILY
Qty: 30 CAPSULE | Refills: 0 | Status: SHIPPED | OUTPATIENT
Start: 2020-05-03 | End: 2020-06-02

## 2020-03-02 ASSESSMENT — ENCOUNTER SYMPTOMS
NERVOUS/ANXIOUS: 0
HEADACHES: 0
ABDOMINAL PAIN: 0
CONSTITUTIONAL NEGATIVE: 1
PALPITATIONS: 0
SLEEP DISTURBANCE: 0

## 2020-03-02 NOTE — PROGRESS NOTES
Subjective     Wilfrido Echavarria is a 19 year old male who presents to clinic today for the following health issues:    HPI   Medication Followup of Adderral    Taking Medication as prescribed: NO    Side Effects:  None    Medication Helping Symptoms:  yes     Orders changed, will be leaving for [place] in July.  Still in the area.  Has been using medication since highschool  No concerns with medication.  No issues w/side effects.  Sleep is OK, no issues with mood.    Notes was bench pressing more weight than he shold have 4 days ago, felt a sharp pain in L pectoralis.  Has been having marked pain since then, affecting sleep.  Deep breaths are painful, most movement hurts.  Noting swelling over L pec.         Review of Systems   Constitutional: Negative.    Cardiovascular: Negative for palpitations.   Gastrointestinal: Negative for abdominal pain.   Neurological: Negative for headaches.   Psychiatric/Behavioral: Negative for sleep disturbance. The patient is not nervous/anxious.             Objective    /76 (BP Location: Right arm, Patient Position: Chair, Cuff Size: Adult Large)   Pulse 66   Temp 98.1  F (36.7  C) (Oral)   Resp 12   Wt 87.1 kg (192 lb)   SpO2 99%   BMI 27.16 kg/m    Body mass index is 27.16 kg/m .  Physical Exam  Vitals signs reviewed.   Neck:      Thyroid: No thyromegaly.   Cardiovascular:      Rate and Rhythm: Normal rate and regular rhythm.      Heart sounds: Normal heart sounds.   Pulmonary:      Effort: Pulmonary effort is normal.      Breath sounds: Normal breath sounds.   Chest:      Chest wall: Swelling and tenderness present.          Comments: Marked swelling and tenderness over superior L chest  Skin:     General: Skin is warm and dry.   Neurological:      Mental Status: He is alert and oriented to person, place, and time.   Psychiatric:         Behavior: Behavior normal.        Assessment and Plan    (F90.9) Attention deficit hyperactivity disorder (ADHD), unspecified ADHD type   (primary encounter diagnosis)  Comment: 3m refills provided.  May call for another 3m cycle, OV in 6m    Plan: amphetamine-dextroamphetamine (ADDERALL XR) 30         MG 24 hr capsule, amphetamine-dextroamphetamine        (ADDERALL XR) 30 MG 24 hr capsule,         amphetamine-dextroamphetamine (ADDERALL XR) 30         MG 24 hr capsule            (S29.011A) Pectoralis muscle rupture, initial encounter  Comment: marked deformity of L pect, referring to ortho  Plan: Orthopedic & Spine  Referral            (F32.1) Moderate major depression (H)  Comment: stable, in remission currentl  Plan:       RTC in 6m    Weston Moura MD

## 2020-04-08 ENCOUNTER — MYC REFILL (OUTPATIENT)
Dept: FAMILY MEDICINE | Facility: CLINIC | Age: 20
End: 2020-04-08

## 2020-04-08 DIAGNOSIS — F90.9 ATTENTION DEFICIT HYPERACTIVITY DISORDER (ADHD), UNSPECIFIED ADHD TYPE: ICD-10-CM

## 2020-04-08 RX ORDER — DEXTROAMPHETAMINE SACCHARATE, AMPHETAMINE ASPARTATE MONOHYDRATE, DEXTROAMPHETAMINE SULFATE AND AMPHETAMINE SULFATE 7.5; 7.5; 7.5; 7.5 MG/1; MG/1; MG/1; MG/1
30 CAPSULE, EXTENDED RELEASE ORAL DAILY
Qty: 30 CAPSULE | Refills: 0 | Status: CANCELLED | OUTPATIENT
Start: 2020-04-08

## 2020-12-27 ENCOUNTER — HEALTH MAINTENANCE LETTER (OUTPATIENT)
Age: 20
End: 2020-12-27

## 2021-05-29 ENCOUNTER — IMMUNIZATION (OUTPATIENT)
Dept: LAB | Facility: CLINIC | Age: 21
End: 2021-05-29
Payer: COMMERCIAL

## 2021-10-09 ENCOUNTER — HEALTH MAINTENANCE LETTER (OUTPATIENT)
Age: 21
End: 2021-10-09

## 2021-10-19 PROBLEM — F32.9 MAJOR DEPRESSION: Status: ACTIVE | Noted: 2018-09-25

## 2022-01-29 ENCOUNTER — HEALTH MAINTENANCE LETTER (OUTPATIENT)
Age: 22
End: 2022-01-29

## 2022-08-25 ENCOUNTER — E-VISIT (OUTPATIENT)
Dept: FAMILY MEDICINE | Facility: CLINIC | Age: 22
End: 2022-08-25
Payer: COMMERCIAL

## 2022-08-25 DIAGNOSIS — Z53.9 ERRONEOUS ENCOUNTER--DISREGARD: Primary | ICD-10-CM

## 2022-08-26 NOTE — PATIENT INSTRUCTIONS
Thank you for choosing us for your care. I think an in-clinic visit would be best next steps based on your symptoms. Please schedule a clinic appointment; you won t be charged for this eVisit.  If you feel you need to be seen more urgently, please be seen in urgent care this weekend.     You can schedule an appointment right here in [facility], or call [phone]

## 2022-08-27 ENCOUNTER — E-VISIT (OUTPATIENT)
Dept: FAMILY MEDICINE | Facility: CLINIC | Age: 22
End: 2022-08-27
Payer: COMMERCIAL

## 2022-08-27 DIAGNOSIS — Z53.9 ERRONEOUS ENCOUNTER--DISREGARD: Primary | ICD-10-CM

## 2022-08-29 NOTE — TELEPHONE ENCOUNTER
This is his second evisit for this.  I sent back he needed to be seen in clinic after the first one.  Please let him know that he needs an OV, in person so an exam can be done.    Zulma Bobby CNP

## 2022-09-11 ENCOUNTER — HEALTH MAINTENANCE LETTER (OUTPATIENT)
Age: 22
End: 2022-09-11

## 2022-10-19 ENCOUNTER — E-VISIT (OUTPATIENT)
Dept: FAMILY MEDICINE | Facility: CLINIC | Age: 22
End: 2022-10-19
Payer: COMMERCIAL

## 2022-10-19 DIAGNOSIS — Z53.9 ERRONEOUS ENCOUNTER--DISREGARD: Primary | ICD-10-CM

## 2022-10-20 ENCOUNTER — OFFICE VISIT (OUTPATIENT)
Dept: FAMILY MEDICINE | Facility: CLINIC | Age: 22
End: 2022-10-20
Payer: COMMERCIAL

## 2022-10-20 VITALS
WEIGHT: 206 LBS | SYSTOLIC BLOOD PRESSURE: 139 MMHG | DIASTOLIC BLOOD PRESSURE: 68 MMHG | HEART RATE: 66 BPM | OXYGEN SATURATION: 98 % | HEIGHT: 71 IN | RESPIRATION RATE: 16 BRPM | TEMPERATURE: 97.4 F | BODY MASS INDEX: 28.84 KG/M2

## 2022-10-20 DIAGNOSIS — F90.2 ATTENTION DEFICIT HYPERACTIVITY DISORDER (ADHD), COMBINED TYPE: Primary | ICD-10-CM

## 2022-10-20 PROCEDURE — 90471 IMMUNIZATION ADMIN: CPT | Performed by: NURSE PRACTITIONER

## 2022-10-20 PROCEDURE — 90715 TDAP VACCINE 7 YRS/> IM: CPT | Performed by: NURSE PRACTITIONER

## 2022-10-20 PROCEDURE — 99214 OFFICE O/P EST MOD 30 MIN: CPT | Mod: 25 | Performed by: NURSE PRACTITIONER

## 2022-10-20 PROCEDURE — 90472 IMMUNIZATION ADMIN EACH ADD: CPT | Performed by: NURSE PRACTITIONER

## 2022-10-20 PROCEDURE — 90686 IIV4 VACC NO PRSV 0.5 ML IM: CPT | Performed by: NURSE PRACTITIONER

## 2022-10-20 RX ORDER — ATOMOXETINE 10 MG/1
CAPSULE ORAL
Qty: 120 CAPSULE | Refills: 0 | Status: SHIPPED | OUTPATIENT
Start: 2022-10-20 | End: 2022-11-21 | Stop reason: DRUGHIGH

## 2022-10-20 ASSESSMENT — PATIENT HEALTH QUESTIONNAIRE - PHQ9
SUM OF ALL RESPONSES TO PHQ QUESTIONS 1-9: 12
10. IF YOU CHECKED OFF ANY PROBLEMS, HOW DIFFICULT HAVE THESE PROBLEMS MADE IT FOR YOU TO DO YOUR WORK, TAKE CARE OF THINGS AT HOME, OR GET ALONG WITH OTHER PEOPLE: VERY DIFFICULT
SUM OF ALL RESPONSES TO PHQ QUESTIONS 1-9: 12

## 2022-10-20 ASSESSMENT — PAIN SCALES - GENERAL: PAINLEVEL: MODERATE PAIN (4)

## 2022-10-20 NOTE — PATIENT INSTRUCTIONS
Start Strattera 10 mg daily and increase every 3 days up to 40 mg daily.  You can try to split dosing to early afternoon if needed to keep motivated through more of the day up to 20 mg twice daily.  Follow-up in 4 weeks for recheck on weight BP and if doing well will refill for every 6 months once we get to a desired dose.

## 2022-10-20 NOTE — PROGRESS NOTES
"  Assessment & Plan     Attention deficit hyperactivity disorder (ADHD), combined type  Discussed non controlled substance option of Strattera and patient is willing to try this pending coverage with insurance.  He does not want adderall because he did not like the way it made him feel and he cannot be on that and serve in the [employer].  I recommended starting at low dose and increasing up to 40 mg daily either once or 20 mg in divided doses if this worked better for him.  I recommend follow-up in 1 month for recheck or sooner if any side effects.  - atomoxetine (STRATTERA) 10 MG capsule; Take 10 mg capsule daily and increase 10 mg every 3 days up to a total of 40 mg daily.  0956}     BMI:   Estimated body mass index is 29.14 kg/m  as calculated from the following:    Height as of this encounter: 1.791 m (5' 10.5\").    Weight as of this encounter: 93.4 kg (206 lb).     See Patient Instructions    Return in about 1 month (around 11/20/2022) for Follow up.    Lida Jacobo NP  [facility]    Marilyn Anna is a 22 year old, presenting for the following health issues:  Medication Request (Refill on adderall ,talk about depression and anxiety medications )      History of Present Illness       Reason for visit:  Medication    He eats 2-3 servings of fruits and vegetables daily.He consumes 0 sweetened beverage(s) daily.He exercises with enough effort to increase his heart rate 60 or more minutes per day.  He exercises with enough effort to increase his heart rate 5 days per week. He is missing 7 dose(s) of medications per week.  He is not taking prescribed medications regularly due to other.    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  He went off the Adderall because he could not serve with a weapon on the controlled substance for his ADHD.  He wants to discuss restarting Adderall or other options for treating the ADHD now that he is noticing this with his inability to focus at his job.  He denies any family history of sudden cardiac death or any chest pain or shortness of breath himself.    Review of Systems   CONSTITUTIONAL: NEGATIVE for fever, chills, change in weight  RESP: NEGATIVE for significant cough or SOB  CV: NEGATIVE for chest pain, palpitations or peripheral edema  PSYCHIATRIC: POSITIVE for Hx of ADHD with increase in symptoms with having a job now.  ROS otherwise negative      Objective    /68   Pulse 66   Temp 97.4  F (36.3  C) (Tympanic)   Resp 16   Ht 1.791 m (5' 10.5\")   Wt 93.4 kg (206 lb)   SpO2 98%   BMI 29.14 kg/m    Body mass index is 29.14 kg/m .  Physical Exam   GENERAL: healthy, alert and no distress  RESP: lungs clear to auscultation - no rales, rhonchi or wheezes  CV: regular rate and rhythm, normal S1 S2, no S3 or S4, no murmur, click or rub, no peripheral edema and peripheral pulses strong  PSYCH: mentation appears normal, affect normal/bright      "

## 2022-10-20 NOTE — PATIENT INSTRUCTIONS
Thank you for choosing us for your care. I think an in-clinic visit would be best next steps based on your symptoms. Please schedule a clinic appointment; you won t be charged for this eVisit.      You can schedule an appointment right here in [facility], or call [phone]

## 2022-11-21 ENCOUNTER — OFFICE VISIT (OUTPATIENT)
Dept: FAMILY MEDICINE | Facility: CLINIC | Age: 22
End: 2022-11-21
Payer: COMMERCIAL

## 2022-11-21 VITALS
HEIGHT: 71 IN | DIASTOLIC BLOOD PRESSURE: 78 MMHG | TEMPERATURE: 97.4 F | WEIGHT: 205 LBS | BODY MASS INDEX: 28.7 KG/M2 | OXYGEN SATURATION: 99 % | RESPIRATION RATE: 16 BRPM | HEART RATE: 101 BPM | SYSTOLIC BLOOD PRESSURE: 122 MMHG

## 2022-11-21 DIAGNOSIS — F90.2 ATTENTION DEFICIT HYPERACTIVITY DISORDER (ADHD), COMBINED TYPE: Primary | ICD-10-CM

## 2022-11-21 DIAGNOSIS — Z23 NEED FOR VACCINATION: ICD-10-CM

## 2022-11-21 PROCEDURE — 93000 ELECTROCARDIOGRAM COMPLETE: CPT | Performed by: NURSE PRACTITIONER

## 2022-11-21 PROCEDURE — 90651 9VHPV VACCINE 2/3 DOSE IM: CPT | Performed by: NURSE PRACTITIONER

## 2022-11-21 PROCEDURE — 90471 IMMUNIZATION ADMIN: CPT | Performed by: NURSE PRACTITIONER

## 2022-11-21 PROCEDURE — 99214 OFFICE O/P EST MOD 30 MIN: CPT | Mod: 25 | Performed by: NURSE PRACTITIONER

## 2022-11-21 RX ORDER — ATOMOXETINE 10 MG/1
CAPSULE ORAL
Qty: 226 CAPSULE | Refills: 0 | Status: SHIPPED | OUTPATIENT
Start: 2022-11-21 | End: 2022-12-23

## 2022-11-21 ASSESSMENT — PAIN SCALES - GENERAL: PAINLEVEL: NO PAIN (0)

## 2022-11-21 NOTE — PROGRESS NOTES
Assessment & Plan     Attention deficit hyperactivity disorder (ADHD), combined type  EKG ordered due to initial chest pain to make sure that there are no cardiac changes. The EKG is NSR with no changes from last EKG.  Increasing dose with taper of 30 mg twice daily for a week and then increasing if needed to up to 40 mg daily.  Patient will follow-up in 1 month for recheck with telephone visit.  - atomoxetine (STRATTERA) 10 MG capsule; Take 3 capsules (30 mg) by mouth 2 times daily for 7 days, THEN 4 capsules (40 mg) 2 times daily for 23 days.  - EKG 12-lead complete w/read - Clinics    Need for vaccination  Copies given on vaccination.  - HPV (GARDASIL 9)    See Patient Instructions    Return in about 1 month (around 12/21/2022) for Follow up, using a phone visit.    Lida Jacobo NP  [facility]    Marilyn Anna is a 22 year old, presenting for the following health issues:  adhd follow up       History of Present Illness       Reason for visit:  Medication    He eats 2-3 servings of fruits and vegetables daily.He consumes 0 sweetened beverage(s) daily.He exercises with enough effort to increase his heart rate 60 or more minutes per day.  He exercises with enough effort to increase his heart rate 5 days per week.   He is taking medications regularly.     Patient states that when he first started the medication he had some mild mid chest pain for the first week feeling like acid reflux in the central part of his chest but no longer is feeling this.  He denies any shortness of breath.  He states that he will occasionally get body sweats in the middle of the night.  He also has some fogginess in the day as the medication may be wearing off.  Patient is currently on 40 mg a day in divided doses he takes 1 in the morning and the other dose between 12 and 1.  He finds it if he takes it after 3:00 he is up with insomnia at night.  Patient likes the way it makes him feel relaxed even  "through tough situations during the day.  His biggest concern is the drowsiness once his medication wears off and he does get a little foggy with this as well.    Patient also would like to get his HPV vaccinations that are required for the .  He would like a copy of these also to show them they have been completed along with his influenza vaccination.    Review of Systems   CONSTITUTIONAL: NEGATIVE for fever, chills, change in weight  RESP: NEGATIVE for significant cough or SOB  CV: NEGATIVE for chest pain, palpitations or peripheral edema  PSYCHIATRIC: POSITIVE for improvement in focus but concerns about the drowsiness in the evening around 4:30 pm before bed but if taking the medication later will cause insomnia at bedtime.  ROS otherwise negative      Objective    /78   Pulse 101   Temp 97.4  F (36.3  C) (Tympanic)   Resp 16   Ht 1.791 m (5' 10.5\")   Wt 93 kg (205 lb)   SpO2 99%   BMI 29.00 kg/m    Body mass index is 29 kg/m .  Physical Exam   GENERAL: healthy, alert and no distress  RESP: lungs clear to auscultation - no rales, rhonchi or wheezes  CV: regular rate and rhythm, normal S1 S2, no S3 or S4, no murmur, click or rub, no peripheral edema and peripheral pulses strong  MS: no gross musculoskeletal defects noted, no edema  PSYCH: mentation appears normal, affect normal/bright    EKG - Reviewed and interpreted by me appears normal, NSR, normal axis, normal intervals, no acute ST/T changes c/w ischemia, no LVH by voltage criteria, unchanged from previous tracings        "

## 2022-11-21 NOTE — PATIENT INSTRUCTIONS
Increase your Strattera dose to 20 mg in the morning and 30 mg in the evening and if needed can increase to 30 mg twice daily.  I have tapered the dose so if you feel you need to increase more you can up to 40 mg twice.  Once you find a good dose, play around with timing of doses so you are not staying up all night.  Follow-up with phone visit in about a month.

## 2022-12-09 ENCOUNTER — OFFICE VISIT (OUTPATIENT)
Dept: URGENT CARE | Facility: URGENT CARE | Age: 22
End: 2022-12-09
Payer: COMMERCIAL

## 2022-12-09 ENCOUNTER — MYC MEDICAL ADVICE (OUTPATIENT)
Dept: FAMILY MEDICINE | Facility: CLINIC | Age: 22
End: 2022-12-09

## 2022-12-09 VITALS
SYSTOLIC BLOOD PRESSURE: 135 MMHG | TEMPERATURE: 98.6 F | DIASTOLIC BLOOD PRESSURE: 80 MMHG | HEART RATE: 111 BPM | OXYGEN SATURATION: 99 %

## 2022-12-09 DIAGNOSIS — J02.0 STREPTOCOCCAL SORE THROAT: Primary | ICD-10-CM

## 2022-12-09 DIAGNOSIS — H10.30 ACUTE CONJUNCTIVITIS, UNSPECIFIED ACUTE CONJUNCTIVITIS TYPE, UNSPECIFIED LATERALITY: ICD-10-CM

## 2022-12-09 LAB — DEPRECATED S PYO AG THROAT QL EIA: POSITIVE

## 2022-12-09 PROCEDURE — 87880 STREP A ASSAY W/OPTIC: CPT

## 2022-12-09 PROCEDURE — 99213 OFFICE O/P EST LOW 20 MIN: CPT | Performed by: PHYSICIAN ASSISTANT

## 2022-12-09 RX ORDER — PENICILLIN V POTASSIUM 500 MG/1
500 TABLET, FILM COATED ORAL 2 TIMES DAILY
Qty: 20 TABLET | Refills: 0 | Status: SHIPPED | OUTPATIENT
Start: 2022-12-09 | End: 2022-12-19

## 2022-12-09 NOTE — PROGRESS NOTES
Assessment & Plan     1. Streptococcal sore throat  Treatment as below  No evidence of PTA, RPA or deep neck space abscess  Supportive cares encouraged  - Streptococcus A Rapid Screen w/Reflex to PCR - Clinic Collect  - penicillin V (VEETID) 500 MG tablet; Take 1 tablet (500 mg) by mouth 2 times daily for 10 days  Dispense: 20 tablet; Refill: 0    2. Acute conjunctivitis, unspecified acute conjunctivitis type, unspecified laterality  Mildly injected conjunctiva  Treatment with lubricating drops systane.   PCN would cover for bacterial pathogen, which I have less suspicion for  Refrain from contact lens wear for 5 days      Return in about 3 days (around 12/12/2022), or if symptoms worsen or fail to improve.    Diagnosis and treatment plan was reviewed with patient and/or family.   We went over any labs or imaging. Discussed worsening symptoms or little to no relief despite treatment plan to follow-up with PCP or return to clinic.  Patient verbalizes understanding. All questions were addressed and answered.     Saira Chacko PA-C  [facility]    CHIEF COMPLAINT:   Chief Complaint   Patient presents with     Pharyngitis     Pos pink eye sore throat, and headache      Subjective     Wilfrido is a 22 year old male who presents to clinic today for evaluation of sore throat, and red eyes. Symptoms started yesterday. Endorses having chills. Brother with similar symptoms last week.   Patient denies eye pain, eyelid swelling, change of vision, Foreign body sensation, photophobia, periorbital erythema or headache.         No past medical history on file.  No past surgical history on file.  Social History     Tobacco Use     Smoking status: Never     Smokeless tobacco: Never   Substance Use Topics     Alcohol use: No     Current Outpatient Medications   Medication     penicillin V (VEETID) 500 MG tablet     atomoxetine (STRATTERA) 10 MG capsule     Multiple Vitamins-Minerals (MULTIVITAMIN PO)      Omega-3 Fatty Acids (FISH OIL PO)     No current facility-administered medications for this visit.     No Known Allergies    10 point ROS of systems were all negative except for pertinent positives noted in my HPI.      Exam:   /80   Pulse 111   Temp 98.6  F (37  C)   SpO2 99%   Constitutional: healthy, alert and no distress  Head: Normocephalic, atraumatic.  Eyes: conjunctiva clear, no drainage  ENT: TMs clear and shiny keshawn, nasal mucosa pink and moist, throat is erythematous without trismus or drooling.   Neck: neck is supple, no cervical lymphadenopathy or nuchal rigidity  Cardiovascular: RRR  Respiratory: CTA bilaterally, no rhonchi or rales  Skin: no rashes  Neurologic: Speech clear, gait normal. Moves all extremities.    Results for orders placed or performed in visit on 12/09/22   Streptococcus A Rapid Screen w/Reflex to PCR - Clinic Collect     Status: Abnormal    Specimen: Throat; Swab   Result Value Ref Range    Group A Strep antigen Positive (A) Negative

## 2022-12-09 NOTE — PATIENT INSTRUCTIONS
Use Systane for eyes   Refrain from wearing contacts for 5 days  Penicillin as prescribed, you are contagious for 24 hours

## 2022-12-09 NOTE — Clinical Note
Alexandru Hilton, I filled out Wilfrido' influenza documentation in clinic, please disregard his previous message.   Thanks, Saira Chacko PA-C

## 2022-12-13 NOTE — TELEPHONE ENCOUNTER
[place] [employer] immunization form printed, prepared, and routed to Lida Jacobo for review and signature.

## 2022-12-14 NOTE — TELEPHONE ENCOUNTER
Form completed, signed, and copy placed in cabinet, copy sent to scan and My Chart note sent to patient to see if he would like to  form or have it mailed.

## 2022-12-21 ENCOUNTER — E-VISIT (OUTPATIENT)
Dept: FAMILY MEDICINE | Facility: CLINIC | Age: 22
End: 2022-12-21
Payer: COMMERCIAL

## 2022-12-21 DIAGNOSIS — F90.2 ATTENTION DEFICIT HYPERACTIVITY DISORDER (ADHD), COMBINED TYPE: Primary | ICD-10-CM

## 2022-12-21 PROCEDURE — 99207 PR NON-BILLABLE SERV PER CHARTING: CPT | Performed by: NURSE PRACTITIONER

## 2022-12-21 NOTE — PATIENT INSTRUCTIONS
Thank you for choosing us for your care. I think an in-clinic visit would be best next steps based on your symptoms. Please schedule a clinic appointment; you won t be charged for this eVisit.      You can schedule an appointment right here in [facility], or call [phone]

## 2023-01-13 ENCOUNTER — VIRTUAL VISIT (OUTPATIENT)
Dept: FAMILY MEDICINE | Facility: CLINIC | Age: 23
End: 2023-01-13
Payer: COMMERCIAL

## 2023-01-13 DIAGNOSIS — F90.9 ATTENTION DEFICIT HYPERACTIVITY DISORDER (ADHD), UNSPECIFIED ADHD TYPE: Primary | ICD-10-CM

## 2023-01-13 PROCEDURE — 99213 OFFICE O/P EST LOW 20 MIN: CPT | Mod: 95 | Performed by: NURSE PRACTITIONER

## 2023-01-13 RX ORDER — DEXTROAMPHETAMINE SACCHARATE, AMPHETAMINE ASPARTATE MONOHYDRATE, DEXTROAMPHETAMINE SULFATE AND AMPHETAMINE SULFATE 2.5; 2.5; 2.5; 2.5 MG/1; MG/1; MG/1; MG/1
10 CAPSULE, EXTENDED RELEASE ORAL DAILY
Qty: 30 CAPSULE | Refills: 0 | Status: SHIPPED | OUTPATIENT
Start: 2023-01-13 | End: 2023-05-25

## 2023-01-13 NOTE — PROGRESS NOTES
Wilfrido is a 22 year old who is being evaluated via a billable telephone visit.      What phone number would you like to be contacted at? [phone]  How would you like to obtain your AVS? Kylee  Distant Location (provider location):  On-site    Assessment & Plan     Attention deficit hyperactivity disorder (ADHD), unspecified ADHD type  Strattera added to allergy list for side effects and stopped.  Starting back on Adderall XR 10 mg daily.  Urine drug screen ordered and patient will complete at the lab.  Recommend in person visit in 1 month for recheck.  - amphetamine-dextroamphetamine (ADDERALL XR) 10 MG 24 hr capsule; Take 1 capsule (10 mg) by mouth daily  - Drug Abuse Screen Panel 13, Urine (Pain Care Package) - lab collect; Future    See Patient Instructions    Return in about 1 month (around 2/13/2023) for Follow up.    Lida Jacobo NP  [facility]    Marilyn Anna is a 22 year old, presenting for the following health issues:  A.D.H.D    HPI     adhd follow medication   Gets chest pain everyday ,feels like hurt burn but not ,patient gets it every morning with dose 40mg  Only gets this feeling in the morning dose but in pm does not seem to bother stomach but gets mind foginess      How many servings of fruits and vegetables do you eat daily?  2-3    On average, how many sweetened beverages do you drink each day (Examples: soda, juice, sweet tea, etc.  Do NOT count diet or artificially sweetened beverages)?   0    How many days per week do you exercise enough to make your heart beat faster? 5    How many minutes a day do you exercise enough to make your heart beat faster? 60 or more    How many days per week do you miss taking your medication? 0    Review of Systems   CONSTITUTIONAL: NEGATIVE for fever, chills, change in weight  RESP: NEGATIVE for significant cough or SOB  CV: NEGATIVE for palpitations or peripheral edema; chest pains with morning dose of  Strattera  PSYCHIATRIC: NEGATIVE for changes in mood or affect and POSITIVE for mind fogginess with afternoon dose of strattera and hx ADHD not controlled by strattera  ROS otherwise negative      Objective        Vitals:  No vitals were obtained today due to virtual visit.    Physical Exam   healthy, alert and no distress  PSYCH: Alert and oriented times 3; coherent speech, normal   rate and volume, able to articulate logical thoughts, able   to abstract reason, no tangential thoughts, no hallucinations   or delusions  His affect is normal  RESP: No cough, no audible wheezing, able to talk in full sentences  Remainder of exam unable to be completed due to telephone visits    Phone call duration: 10 minutes

## 2023-01-13 NOTE — PATIENT INSTRUCTIONS
Stop Strattera.  Start 10 mg XR Adderall daily.  Make lab appointment for urine drug screen next week.  Make appointment in person in 1 month for recheck BP/Weight/refills.

## 2023-01-15 NOTE — NURSING NOTE
"Chief Complaint   Patient presents with     Recheck Medication       Initial /68 (BP Location: Right arm, Cuff Size: Adult Regular)  Pulse 58  Temp 96.3  F (35.7  C) (Tympanic)  Ht 5' 10.5\" (1.791 m)  Wt 166 lb (75.3 kg)  SpO2 99%  BMI 23.48 kg/m2 Estimated body mass index is 23.48 kg/(m^2) as calculated from the following:    Height as of this encounter: 5' 10.5\" (1.791 m).    Weight as of this encounter: 166 lb (75.3 kg).  Medication Reconciliation: complete   Ernestina Calle MA    " 1 person assist

## 2023-01-18 ENCOUNTER — LAB (OUTPATIENT)
Dept: LAB | Facility: CLINIC | Age: 23
End: 2023-01-18
Payer: COMMERCIAL

## 2023-01-18 DIAGNOSIS — F90.9 ATTENTION DEFICIT HYPERACTIVITY DISORDER (ADHD), UNSPECIFIED ADHD TYPE: ICD-10-CM

## 2023-01-18 LAB
AMPHETAMINES UR QL: DETECTED
BARBITURATES UR QL SCN: NOT DETECTED
BENZODIAZ UR QL SCN: NOT DETECTED
BUPRENORPHINE UR QL: NOT DETECTED
CANNABINOIDS UR QL: NOT DETECTED
COCAINE UR QL SCN: NOT DETECTED
D-METHAMPHET UR QL: NOT DETECTED
METHADONE UR QL SCN: NOT DETECTED
OPIATES UR QL SCN: NOT DETECTED
OXYCODONE UR QL SCN: NOT DETECTED
PCP UR QL SCN: NOT DETECTED
PROPOXYPH UR QL: NOT DETECTED
TRICYCLICS UR QL SCN: NOT DETECTED

## 2023-01-18 PROCEDURE — 80306 DRUG TEST PRSMV INSTRMNT: CPT

## 2023-02-10 ENCOUNTER — OFFICE VISIT (OUTPATIENT)
Dept: FAMILY MEDICINE | Facility: CLINIC | Age: 23
End: 2023-02-10
Payer: COMMERCIAL

## 2023-02-10 VITALS
OXYGEN SATURATION: 99 % | HEART RATE: 74 BPM | BODY MASS INDEX: 29.23 KG/M2 | HEIGHT: 71 IN | RESPIRATION RATE: 14 BRPM | SYSTOLIC BLOOD PRESSURE: 112 MMHG | WEIGHT: 208.8 LBS | TEMPERATURE: 97.1 F | DIASTOLIC BLOOD PRESSURE: 68 MMHG

## 2023-02-10 DIAGNOSIS — F90.9 ATTENTION DEFICIT HYPERACTIVITY DISORDER (ADHD), UNSPECIFIED ADHD TYPE: ICD-10-CM

## 2023-02-10 PROCEDURE — 99213 OFFICE O/P EST LOW 20 MIN: CPT | Performed by: NURSE PRACTITIONER

## 2023-02-10 RX ORDER — DEXTROAMPHETAMINE SACCHARATE, AMPHETAMINE ASPARTATE MONOHYDRATE, DEXTROAMPHETAMINE SULFATE AND AMPHETAMINE SULFATE 2.5; 2.5; 2.5; 2.5 MG/1; MG/1; MG/1; MG/1
10 CAPSULE, EXTENDED RELEASE ORAL DAILY
Qty: 30 CAPSULE | Refills: 0 | Status: SHIPPED | OUTPATIENT
Start: 2023-02-13 | End: 2023-03-15

## 2023-02-10 RX ORDER — DEXTROAMPHETAMINE SACCHARATE, AMPHETAMINE ASPARTATE MONOHYDRATE, DEXTROAMPHETAMINE SULFATE AND AMPHETAMINE SULFATE 2.5; 2.5; 2.5; 2.5 MG/1; MG/1; MG/1; MG/1
10 CAPSULE, EXTENDED RELEASE ORAL DAILY
Qty: 30 CAPSULE | Refills: 0 | Status: CANCELLED | OUTPATIENT
Start: 2023-02-10

## 2023-02-10 RX ORDER — DEXTROAMPHETAMINE SACCHARATE, AMPHETAMINE ASPARTATE MONOHYDRATE, DEXTROAMPHETAMINE SULFATE AND AMPHETAMINE SULFATE 2.5; 2.5; 2.5; 2.5 MG/1; MG/1; MG/1; MG/1
10 CAPSULE, EXTENDED RELEASE ORAL DAILY
Qty: 30 CAPSULE | Refills: 0 | Status: SHIPPED | OUTPATIENT
Start: 2023-04-13 | End: 2023-05-13

## 2023-02-10 RX ORDER — DEXTROAMPHETAMINE SACCHARATE, AMPHETAMINE ASPARTATE MONOHYDRATE, DEXTROAMPHETAMINE SULFATE AND AMPHETAMINE SULFATE 2.5; 2.5; 2.5; 2.5 MG/1; MG/1; MG/1; MG/1
10 CAPSULE, EXTENDED RELEASE ORAL DAILY
Qty: 30 CAPSULE | Refills: 0 | Status: SHIPPED | OUTPATIENT
Start: 2023-03-13 | End: 2023-04-12

## 2023-02-10 ASSESSMENT — PATIENT HEALTH QUESTIONNAIRE - PHQ9
10. IF YOU CHECKED OFF ANY PROBLEMS, HOW DIFFICULT HAVE THESE PROBLEMS MADE IT FOR YOU TO DO YOUR WORK, TAKE CARE OF THINGS AT HOME, OR GET ALONG WITH OTHER PEOPLE: NOT DIFFICULT AT ALL
SUM OF ALL RESPONSES TO PHQ QUESTIONS 1-9: 0
SUM OF ALL RESPONSES TO PHQ QUESTIONS 1-9: 0

## 2023-02-10 ASSESSMENT — PAIN SCALES - GENERAL: PAINLEVEL: NO PAIN (0)

## 2023-02-10 NOTE — PATIENT INSTRUCTIONS
Refilled medication for 3 months.  You need to pick this up monthly to get the refill otherwise they will not fill it.  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  4.  Make appointment for follow-up in 3 months.

## 2023-02-10 NOTE — PROGRESS NOTES
Assessment & Plan     Attention deficit hyperactivity disorder (ADHD), unspecified ADHD type  Stable on current dose. Refill given for 3 months.  Controlled substance agreement signed and scanned to chart.  Follow-up recommended in 3 months for refill.  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  - amphetamine-dextroamphetamine (ADDERALL XR) 10 MG 24 hr capsule; Take 1 capsule (10 mg) by mouth daily for 30 days  - amphetamine-dextroamphetamine (ADDERALL XR) 10 MG 24 hr capsule; Take 1 capsule (10 mg) by mouth daily for 30 days  - amphetamine-dextroamphetamine (ADDERALL XR) 10 MG 24 hr capsule; Take 1 capsule (10 mg) by mouth daily for 30 days    See Patient Instructions    Return in about 3 months (around 5/10/2023) for Follow up.    Lida Jacobo NP  [facility]    Marilyn Anna is a 22 year old, presenting for the following health issues:  Recheck Medication      History of Present Illness       Reason for visit:  Monthly check-in    He eats 2-3 servings of fruits and vegetables daily.He consumes 0 sweetened beverage(s) daily.He exercises with enough effort to increase his heart rate 60 or more minutes per day.  He exercises with enough effort to increase his heart rate 6 days per week.   He is taking medications regularly.    Today's PHQ-9         PHQ-9 Total Score: 0    PHQ-9 Q9 Thoughts of better off dead/self-harm past 2 weeks :   Not at all    How difficult have these problems made it for you to do your work, take care of things at home, or get along with other people: Not difficult at all     Patient is doing well on current dosing.  He does not feel that he needs a change.  Once in awhile he will drink an energy drink in the afternoon around 3 pm if he feels he needs a little extra energy.  Overall doing well on this dosing.  Weight and BP stable today.    Review of Systems   CONSTITUTIONAL: NEGATIVE for fever, chills, change in  "weight  PSYCHIATRIC: POSITIVE for ADHD  ROS otherwise negative      Objective    /68   Pulse 74   Temp 97.1  F (36.2  C) (Tympanic)   Resp 14   Ht 1.791 m (5' 10.5\")   Wt 94.7 kg (208 lb 12.8 oz)   SpO2 99%   BMI 29.54 kg/m    Body mass index is 29.54 kg/m .  Physical Exam   GENERAL: healthy, alert and no distress  RESP: lungs clear to auscultation - no rales, rhonchi or wheezes  CV: regular rate and rhythm, normal S1 S2, no S3 or S4, no murmur, click or rub, no peripheral edema and peripheral pulses strong  PSYCH: mentation appears normal, affect normal/bright      "

## 2023-02-10 NOTE — LETTER
[facility]  02/10/23  Patient: Wilfrido Echavarria  YOB: 2000  Medical Record Number: [id number]                                                                                  Non-Opioid Controlled Substance Agreement    This is an agreement between you and your provider regarding safe and appropriate use of controlled substances prescribed by your care team. Controlled substances are?medicines that can cause physical and mental dependence (abuse).     There are strict laws about having and using these medicines. We here at [facility] are  committed to working with you in your efforts to get better. To support you in this work, we'll help you schedule regular office appointments for medicine refills. If we must cancel or change your appointment for any reason, we'll make sure you have enough medicine to last until your next appointment.     As a Provider, I will:     Listen carefully to your concerns while treating you with respect.     Recommend a treatment plan that I believe is in your best interest and may involve therapies other than medicine.      Talk with you often about the possible benefits and the risk of harm of any medicine that we prescribe for you.    Assess the safety of this medicine and check how well it works.      Provide a plan on how to taper (discontinue or go off) using this medicine if the decision is made to stop its use.      ::  As a Patient, I understand controlled substances:       Are prescribed by my care provider to help me function or work and manage my condition(s).?    Are strong medicines and can cause serious side effects.       Need to be taken exactly as prescribed.?Combining controlled substances with certain medicines or chemicals (such as illegal drugs, alcohol, sedatives, sleeping pills, and benzodiazepines) can be dangerous or even fatal.? If I stop taking my medicines suddenly, I may have severe withdrawal symptoms.     The  risks, benefits, and side effects of these medicine(s) were explained to me. I agree that:    1. I will take part in other treatments as advised by my care team. This may be psychiatry or counseling, physical therapy, behavioral therapy, group treatment or a referral to specialist.    2. I will keep all my appointments and understand this is part of the monitoring of controlled substances.?My care team may require an office visit for EVERY controlled substance refill. If I miss appointments or don t follow instructions, my care team may stop my medicine    3. I will take my medicines as prescribed. I will not change the dose or schedule unless my care team tells me to. There will be no refills if I run out early.      4. I may be asked to come to the clinic and complete a urine drug test or complete a pill count. If I don t give a urine sample or participate in a pill count, the care team may stop my medicine.    5. I will only receive controlled substance prescriptions from this clinic. If I am treated by another provider, I will tell them that I am taking controlled substances and that I have a treatment agreement with this provider. I will inform my [facility] care team within one business day if I am given a prescription for any controlled substance by another healthcare provider. My [facility] care team can contact other providers and pharmacists about my use of any medicines.    6. It is up to me to make sure that I don't run out of my medicines on weekends or holidays.?If my care team is willing to refill my prescription without a visit, I must request refills only during office hours. Refills may take up to 3 business days to process. I will use one pharmacy to fill all my controlled substance prescriptions. I will notify the clinic about any changes to my insurance or medicine availability.    7. I am responsible for my prescriptions. If the medicine/prescription is lost, stolen or destroyed,  it will not be replaced.?I also agree not to share controlled substance medicines with anyone.     8. I am aware I should not use any illegal or recreational drugs. I agree not to drink alcohol unless my care team says I can.     9. If I enroll in the [place] Medical Cannabis program, I will tell my care team before my next refill.    10. I will tell my care team right away if I become pregnant, have a new medical problem treated outside of my regular clinic, or have a change in my medicines.     11. I understand that this medicine can affect my thinking, judgment and reaction time.? Alcohol and drugs affect the brain and body, which can affect the safety of my driving. Being under the influence of alcohol or drugs can affect my decision-making, behaviors, personal safety and the safety of others. Driving while impaired (DWI) can occur if a person is driving, operating or in physical control of a car, motorcycle, boat, snowmobile, ATV, motorbike, off-road vehicle or any other motor vehicle ([place] Statute 169A.20). I understand the risk if I choose to drive or operate any vehicle or machinery.    I understand that if I do not follow any of the conditions above, my prescriptions or treatment may be stopped or changed.   I agree that my provider, clinic care team and pharmacy may work with any city, state or federal law enforcement agency that investigates the misuse, sale or other diversion of my controlled medicine. I will allow my provider to discuss my care with, or share a copy of, this agreement with any other treating provider, pharmacy or emergency room where I receive care.     I have read this agreement and have asked questions about anything I did not understand.    ________________________________________________________  Patient Signature - Wilfrido ELLSWORTH Blood     ___________________                   Date     ________________________________________________________  Provider Signature - Lida Jacobo NP        ___________________                   Date     ________________________________________________________  Witness Signature (required if provider not present while patient signing)          ___________________                   Date

## 2023-03-03 ENCOUNTER — E-VISIT (OUTPATIENT)
Dept: FAMILY MEDICINE | Facility: CLINIC | Age: 23
End: 2023-03-03
Payer: COMMERCIAL

## 2023-03-03 ENCOUNTER — TELEPHONE (OUTPATIENT)
Dept: FAMILY MEDICINE | Facility: CLINIC | Age: 23
End: 2023-03-03

## 2023-03-03 DIAGNOSIS — F90.9 ATTENTION DEFICIT HYPERACTIVITY DISORDER (ADHD), UNSPECIFIED ADHD TYPE: Primary | ICD-10-CM

## 2023-03-03 PROBLEM — F34.1 DYSTHYMIA: Status: RESOLVED | Noted: 2017-08-11 | Resolved: 2023-03-03

## 2023-03-03 PROBLEM — F60.1 SCHIZOID PERSONALITY DISORDER (H): Status: RESOLVED | Noted: 2017-08-11 | Resolved: 2023-03-03

## 2023-03-03 PROBLEM — F32.9 MAJOR DEPRESSION: Status: RESOLVED | Noted: 2018-09-25 | Resolved: 2023-03-03

## 2023-03-03 PROCEDURE — 99423 OL DIG E/M SVC 21+ MIN: CPT | Performed by: NURSE PRACTITIONER

## 2023-03-03 NOTE — TELEPHONE ENCOUNTER
DEPLOYMENT REQUIREMENTS    Who is Calling: patient    Update: needs notes from the last 2 visits   Can you do a virtual from [place] (scheduled 3 months from now)  Can he get 6 months of ADDERALL RX.  DEPLOYMENT is April 2023. Just got this info today.    Does caller want a call/response back: Yes     Could we send this information to you in [employer] or would you prefer to receive a phone call?:   Patient would prefer a phone call   Okay to leave a detailed message?: Yes at Home number on file [phone] (home)

## 2023-03-03 NOTE — TELEPHONE ENCOUNTER
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  He is working on trying to figure out how to get his Adderall filled during his time away.  His staff surgeon has asked for 2 months of his notes which he would like faxed.  They were also wondering if I can do a visit virtually in 3 months while he is in [place].  He also was questioning where he supposed to get his medications filled.    Patient will supply us with fax number to get records for the last 2 months of his treatment faxed.  I am unable to do oversee virtual appointments but have advised him that I can do 6 months of paper prescription signed or we can do 3 months at a time through a mail pharmacy if this works better.  Patient will check with staff sergeant on how this will work.    We will stay in contact via messages until this is completed.    Lida Jacobo NP on 3/3/2023 at 12:21 PM

## 2023-03-03 NOTE — LETTER
[facility]  [address] [address]  Phone: [phone]    March 3, 2023        Wilfrido ELLSWORTH Blood  [address] [id number]          To whom it may concern:    RE: Wilfrido ELLSWORTH Blood    Patient was seen and treated today at our clinic.  It is okay for him to do his 3 month follow-up for ADHD via virtual visit while deployed if he has blood pressure and weight available.    Please contact me for questions or concerns.      Sincerely,        Lida Jacobo NP

## 2023-03-03 NOTE — TELEPHONE ENCOUNTER
Noted pt had evisit with provider to discuss these issues/questions today. Per evisit notes pt is going to let us know the number to have records faxed.     Viki Stanford RN

## 2023-03-08 ENCOUNTER — MYC MEDICAL ADVICE (OUTPATIENT)
Dept: FAMILY MEDICINE | Facility: CLINIC | Age: 23
End: 2023-03-08
Payer: COMMERCIAL

## 2023-03-16 NOTE — TELEPHONE ENCOUNTER
Forwarding Kylee to PCP.  Okay to give [facility] a verbal order for patient to pick his April Adderall Rx early, prior to his 3[place]21/[place]  de[place]?    Cyndi Fine RN  [facility]

## 2023-03-17 NOTE — TELEPHONE ENCOUNTER
RN attempted to call [facility], but they're not open yet. Will try again later.     Cyndi Fine RN  [facility]

## 2023-05-06 ENCOUNTER — HEALTH MAINTENANCE LETTER (OUTPATIENT)
Age: 23
End: 2023-05-06

## 2023-05-18 ASSESSMENT — ENCOUNTER SYMPTOMS
WEAKNESS: 0
HEMATURIA: 0
ARTHRALGIAS: 0
PALPITATIONS: 0
PARESTHESIAS: 0
NAUSEA: 0
CONSTIPATION: 0
JOINT SWELLING: 0
FEVER: 0
HEADACHES: 0
DIARRHEA: 0
EYE PAIN: 0
HEMATOCHEZIA: 0
SORE THROAT: 0
ABDOMINAL PAIN: 0
CHILLS: 0
MYALGIAS: 0
DYSURIA: 0
DIZZINESS: 0
HEARTBURN: 0
NERVOUS/ANXIOUS: 0
COUGH: 0
FREQUENCY: 0

## 2023-05-25 ENCOUNTER — OFFICE VISIT (OUTPATIENT)
Dept: FAMILY MEDICINE | Facility: CLINIC | Age: 23
End: 2023-05-25
Payer: COMMERCIAL

## 2023-05-25 VITALS
HEART RATE: 82 BPM | WEIGHT: 211 LBS | HEIGHT: 72 IN | RESPIRATION RATE: 16 BRPM | BODY MASS INDEX: 28.58 KG/M2 | TEMPERATURE: 97.2 F | OXYGEN SATURATION: 98 % | SYSTOLIC BLOOD PRESSURE: 128 MMHG | DIASTOLIC BLOOD PRESSURE: 72 MMHG

## 2023-05-25 DIAGNOSIS — Z00.00 ROUTINE GENERAL MEDICAL EXAMINATION AT A HEALTH CARE FACILITY: Primary | ICD-10-CM

## 2023-05-25 DIAGNOSIS — Z11.59 NEED FOR HEPATITIS C SCREENING TEST: ICD-10-CM

## 2023-05-25 DIAGNOSIS — Z11.4 SCREENING FOR HIV (HUMAN IMMUNODEFICIENCY VIRUS): ICD-10-CM

## 2023-05-25 DIAGNOSIS — F90.9 ATTENTION DEFICIT HYPERACTIVITY DISORDER (ADHD), UNSPECIFIED ADHD TYPE: ICD-10-CM

## 2023-05-25 PROCEDURE — 99395 PREV VISIT EST AGE 18-39: CPT | Performed by: NURSE PRACTITIONER

## 2023-05-25 PROCEDURE — 99214 OFFICE O/P EST MOD 30 MIN: CPT | Mod: 25 | Performed by: NURSE PRACTITIONER

## 2023-05-25 RX ORDER — DEXTROAMPHETAMINE SACCHARATE, AMPHETAMINE ASPARTATE, DEXTROAMPHETAMINE SULFATE AND AMPHETAMINE SULFATE 2.5; 2.5; 2.5; 2.5 MG/1; MG/1; MG/1; MG/1
10 TABLET ORAL DAILY
Qty: 30 TABLET | Refills: 0 | Status: SHIPPED | OUTPATIENT
Start: 2023-07-26 | End: 2023-08-25

## 2023-05-25 RX ORDER — FEXOFENADINE HCL AND PSEUDOEPHEDRINE HCI 60; 120 MG/1; MG/1
60 TABLET, EXTENDED RELEASE ORAL
COMMUNITY
Start: 2023-04-17 | End: 2024-06-03

## 2023-05-25 RX ORDER — DEXTROAMPHETAMINE SACCHARATE, AMPHETAMINE ASPARTATE, DEXTROAMPHETAMINE SULFATE AND AMPHETAMINE SULFATE 2.5; 2.5; 2.5; 2.5 MG/1; MG/1; MG/1; MG/1
10 TABLET ORAL DAILY
Qty: 30 TABLET | Refills: 0 | Status: SHIPPED | OUTPATIENT
Start: 2023-05-25 | End: 2023-06-24

## 2023-05-25 RX ORDER — DEXTROAMPHETAMINE SACCHARATE, AMPHETAMINE ASPARTATE MONOHYDRATE, DEXTROAMPHETAMINE SULFATE AND AMPHETAMINE SULFATE 2.5; 2.5; 2.5; 2.5 MG/1; MG/1; MG/1; MG/1
10 CAPSULE, EXTENDED RELEASE ORAL DAILY
Qty: 30 CAPSULE | Refills: 0 | Status: SHIPPED | OUTPATIENT
Start: 2023-05-25 | End: 2023-06-12

## 2023-05-25 RX ORDER — DEXTROAMPHETAMINE SACCHARATE, AMPHETAMINE ASPARTATE MONOHYDRATE, DEXTROAMPHETAMINE SULFATE AND AMPHETAMINE SULFATE 2.5; 2.5; 2.5; 2.5 MG/1; MG/1; MG/1; MG/1
10 CAPSULE, EXTENDED RELEASE ORAL DAILY
Qty: 30 CAPSULE | Refills: 0 | Status: SHIPPED | OUTPATIENT
Start: 2023-07-26 | End: 2023-08-25

## 2023-05-25 RX ORDER — DEXTROAMPHETAMINE SACCHARATE, AMPHETAMINE ASPARTATE MONOHYDRATE, DEXTROAMPHETAMINE SULFATE AND AMPHETAMINE SULFATE 2.5; 2.5; 2.5; 2.5 MG/1; MG/1; MG/1; MG/1
10 CAPSULE, EXTENDED RELEASE ORAL DAILY
Qty: 30 CAPSULE | Refills: 0 | Status: SHIPPED | OUTPATIENT
Start: 2023-06-25 | End: 2023-07-25

## 2023-05-25 RX ORDER — DEXTROAMPHETAMINE SACCHARATE, AMPHETAMINE ASPARTATE, DEXTROAMPHETAMINE SULFATE AND AMPHETAMINE SULFATE 2.5; 2.5; 2.5; 2.5 MG/1; MG/1; MG/1; MG/1
10 TABLET ORAL DAILY
Qty: 30 TABLET | Refills: 0 | Status: SHIPPED | OUTPATIENT
Start: 2023-06-25 | End: 2023-07-25

## 2023-05-25 ASSESSMENT — ENCOUNTER SYMPTOMS
ABDOMINAL PAIN: 0
HEARTBURN: 0
DIARRHEA: 0
JOINT SWELLING: 0
DYSURIA: 0
SORE THROAT: 0
CONSTIPATION: 0
HEADACHES: 0
DIZZINESS: 0
FEVER: 0
WEAKNESS: 0
EYE PAIN: 0
FREQUENCY: 0
HEMATURIA: 0
HEMATOCHEZIA: 0
MYALGIAS: 0
PARESTHESIAS: 0
CHILLS: 0
NERVOUS/ANXIOUS: 0
ARTHRALGIAS: 0
PALPITATIONS: 0
COUGH: 0
NAUSEA: 0

## 2023-05-25 ASSESSMENT — PAIN SCALES - GENERAL: PAINLEVEL: SEVERE PAIN (7)

## 2023-05-25 NOTE — PROGRESS NOTES
"SUBJECTIVE:   CC: Wilfrido is an 22 year old who presents for preventative health visit.       5/25/2023     6:46 AM   Additional Questions   Roomed by rmb   Accompanied by self         5/25/2023     6:46 AM   Patient Reported Additional Medications   Patient reports taking the following new medications none   Patient has been advised of split billing requirements and indicates understanding: Yes  Healthy Habits:    Getting at least 3 servings of Calcium per day:  Yes    Bi-annual eye exam:  Yes    Dental care twice a year:  Yes    Sleep apnea or symptoms of sleep apnea:  None    Diet:  Regular (no restrictions)    Frequency of exercise:  6-7 days/week    Duration of exercise:  45-60 minutes    Taking medications regularly:  Yes    Medication side effects:  None    PHQ-2 Total Score:    Additional concerns today:  Yes    ADHD Follow-Up    Date of last ADHD office visit:02/10/2023   Status since last visit: Stable  Taking controlled (daily) medications as prescribed: Yes                       Parent/Patient Concerns with Medications: None  ADHD Medication     Amphetamines Disp Start End     amphetamine-dextroamphetamine (ADDERALL XR) 10 MG 24 hr capsule    30 capsule 1/13/2023     Sig - Route: Take 1 capsule (10 mg) by mouth daily - Oral    Class: E-Prescribe    Earliest Fill Date: 1/13/2023    Prior authorization: Approved        Sleep: no problems  Home/Family Concerns: Stable  Peer Concerns: Stable    Co-Morbid Diagnosis: None    Currently in counseling: No    Medication Benefits:   Controlled symptoms: Hyperactivity - motor restlessness, Attention span, Distractability and Finishing tasks  Uncontrolled Symptoms: None    Medication side effects:  Side effects noted: none  Denies: appetite suppression, weight loss, insomnia, tics, palpitations, stomach ache, headache, emotional lability, rebound irritability, drowsiness, \"zombie\" effect, growth suppression and dry mouth    Have you ever done Advance Care Planning? " (For example, a Health Directive, POLST, or a discussion with a medical provider or your loved ones about your wishes): No, advance care planning information given to patient to review.  Patient plans to discuss their wishes with loved ones or provider.      Social History     Tobacco Use     Smoking status: Never     Smokeless tobacco: Never   Vaping Use     Vaping status: Never Used   Substance Use Topics     Alcohol use: No           5/18/2023     9:14 AM   Alcohol Use   Prescreen: >3 drinks/day or >7 drinks/week? Not Applicable     Last PSA: No results found for: PSA    Reviewed orders with patient. Reviewed health maintenance and updated orders accordingly - Yes  Lab work is in process  Labs reviewed in EPIC  BP Readings from Last 3 Encounters:   05/25/23 128/72   02/10/23 112/68   12/09/22 135/80    Wt Readings from Last 3 Encounters:   05/25/23 95.7 kg (211 lb)   02/10/23 94.7 kg (208 lb 12.8 oz)   11/21/22 93 kg (205 lb)               Patient Active Problem List   Diagnosis     Attention deficit hyperactivity disorder (ADHD), unspecified ADHD type     Acne     Hypertrophy of breast     History reviewed. No pertinent surgical history.    Social History     Tobacco Use     Smoking status: Never     Smokeless tobacco: Never   Vaping Use     Vaping status: Never Used   Substance Use Topics     Alcohol use: No     Family History   Problem Relation Age of Onset     Family History Negative Mother      Hypertension Brother      Hypertension Brother      Hypertension Brother      Hypertension Brother      Hypertension Brother      Anemia Sister          Current Outpatient Medications   Medication Sig Dispense Refill     amphetamine-dextroamphetamine (ADDERALL XR) 10 MG 24 hr capsule Take 1 capsule (10 mg) by mouth daily 30 capsule 0     fexofenadine-pseudoePHEDrine (ALLEGRA-D)  MG 12 hr tablet Take 60 mg by mouth       Ibuprofen (MOTRIN CHILDRENS PO)        Multiple Vitamins-Minerals (MULTIVITAMIN PO)         Omega-3 Fatty Acids (FISH OIL PO)        Allergies   Allergen Reactions     Strattera [Atomoxetine] Other (See Comments)     Causing chest pain with AM dose and mind fogginess in PM dose       Reviewed and updated as needed this visit by clinical staff   Tobacco  Allergies  Meds  Problems  Med Hx  Surg Hx  Fam Hx  Soc   Hx        Reviewed and updated as needed this visit by Provider   Tobacco  Allergies   Problems  Med Hx  Surg Hx  Fam Hx  Soc Hx       Past Medical History:   Diagnosis Date     ADHD (attention deficit hyperactivity disorder)       History reviewed. No pertinent surgical history.    Review of Systems   Constitutional: Negative for chills and fever.   HENT: Negative for congestion, ear pain, hearing loss and sore throat.    Eyes: Negative for pain and visual disturbance.   Respiratory: Negative for cough.    Cardiovascular: Negative for chest pain, palpitations and peripheral edema.   Gastrointestinal: Negative for abdominal pain, constipation, diarrhea, heartburn, hematochezia and nausea.   Genitourinary: Negative for dysuria, frequency, genital sores, hematuria, impotence, penile discharge and urgency.   Musculoskeletal: Negative for arthralgias, joint swelling and myalgias.   Skin: Negative for rash.   Neurological: Negative for dizziness, weakness, headaches and paresthesias.   Psychiatric/Behavioral: Negative for mood changes. The patient is not nervous/anxious.      Low back pain above right butt cheek.  Constant pain with occasional radiculopathy with squatting or right leg pressure into the ground.  Did move a certain way when pain started.  Going to chiropractor/PT friend and this will help for awhile and then a couple days later will hurt.    OBJECTIVE:   /72   Pulse 82   Temp 97.2  F (36.2  C) (Tympanic)   Resp 16   Ht 1.829 m (6')   Wt 95.7 kg (211 lb)   SpO2 98%   BMI 28.62 kg/m      Physical Exam  GENERAL: healthy, alert and no distress  EYES: Eyes grossly  normal to inspection, PERRL and conjunctivae and sclerae normal  HENT: ear canals and TM's normal, nose and mouth without ulcers or lesions  NECK: no adenopathy, no asymmetry, masses, or scars and thyroid normal to palpation  RESP: lungs clear to auscultation - no rales, rhonchi or wheezes  CV: regular rate and rhythm, normal S1 S2, no S3 or S4, no murmur, click or rub, no peripheral edema and peripheral pulses strong  ABDOMEN: soft, nontender, no hepatosplenomegaly, no masses and bowel sounds normal  MS: no gross musculoskeletal defects noted, no edema  SKIN: no suspicious lesions or rashes  NEURO: Normal strength and tone, mentation intact and speech normal  PSYCH: mentation appears normal, affect normal/bright  LYMPH: normal ant/post cervical, supraclavicular nodes    Diagnostic Test Results:  Labs reviewed in Epic    ASSESSMENT/PLAN:   (Z00.00) Routine general medical examination at a health care facility  (primary encounter diagnosis)  Comment: Patient is up-to-date on all health maintenance.  Reviewed preventative health recommendations handout.  Follow-up recommended in 1 year for preventative health exam.  Plan: REVIEW OF HEALTH MAINTENANCE PROTOCOL ORDERS    (F90.9) Attention deficit hyperactivity disorder (ADHD), unspecified ADHD type  Comment: Refilled Adderall extended release 10 mg daily.  Since patient is having a crash in the early afternoon, added immediate release 10 mg to see if this improves his afternoon performance with attention.  Recommend follow-up in 3 months for refills.  Plan: amphetamine-dextroamphetamine (ADDERALL XR) 10         MG 24 hr capsule, amphetamine-dextroamphetamine        (ADDERALL XR) 10 MG 24 hr capsule,         amphetamine-dextroamphetamine (ADDERALL XR) 10         MG 24 hr capsule, amphetamine-dextroamphetamine        (ADDERALL) 10 MG tablet,         amphetamine-dextroamphetamine (ADDERALL) 10 MG         tablet, amphetamine-dextroamphetamine         (ADDERALL) 10 MG  tablet    Patient has been advised of split billing requirements and indicates understanding: Yes      COUNSELING:   Reviewed preventive health counseling, as reflected in patient instructions       Regular exercise       Vision screening       Immunizations      Up to date through the        Advance Care Planning      BMI:   Estimated body mass index is 28.62 kg/m  as calculated from the following:    Height as of this encounter: 1.829 m (6').    Weight as of this encounter: 95.7 kg (211 lb).   Weight management plan: Discussed healthy diet and exercise guidelines      He reports that he has never smoked. He has never used smokeless tobacco.    Lida Jacobo NP  [facility]

## 2023-06-12 ENCOUNTER — MYC MEDICAL ADVICE (OUTPATIENT)
Dept: FAMILY MEDICINE | Facility: CLINIC | Age: 23
End: 2023-06-12
Payer: COMMERCIAL

## 2023-06-12 DIAGNOSIS — F90.9 ATTENTION DEFICIT HYPERACTIVITY DISORDER (ADHD), UNSPECIFIED ADHD TYPE: ICD-10-CM

## 2023-06-12 RX ORDER — DEXTROAMPHETAMINE SACCHARATE, AMPHETAMINE ASPARTATE MONOHYDRATE, DEXTROAMPHETAMINE SULFATE AND AMPHETAMINE SULFATE 2.5; 2.5; 2.5; 2.5 MG/1; MG/1; MG/1; MG/1
10 CAPSULE, EXTENDED RELEASE ORAL DAILY
Qty: 30 CAPSULE | Refills: 0 | Status: SHIPPED | OUTPATIENT
Start: 2023-06-12 | End: 2023-11-30

## 2023-06-12 NOTE — TELEPHONE ENCOUNTER
Pt unable to get adderall at his pharmacy. Would like rx to go to new pharmacy.  See mychart    pended        Thank you  Lloyd Zaragoza RN

## 2023-09-01 ENCOUNTER — MYC MEDICAL ADVICE (OUTPATIENT)
Dept: FAMILY MEDICINE | Facility: CLINIC | Age: 23
End: 2023-09-01
Payer: COMMERCIAL

## 2023-09-01 NOTE — LETTER
[facility]  [address] [address]  Phone: [phone]    September 1, 2023      Wilfrido ELLSWORTH Blood  [address] [address]        Medication Permission Form        Child's Name:  Wilfrido Echavarria    YOB: 2000      I have prescribed the following medication for this child and request that it be administered by day care personnel or by the school nurse while the child is at day care or school.      Medication:    Current Outpatient Medications   Medication    amphetamine-dextroamphetamine (ADDERALL XR) 10 MG 24 hr capsule                     Provider:     Lida Jacobo

## 2023-09-01 NOTE — TELEPHONE ENCOUNTER
Message sent to patient that copy was sent via [employer] and copy printed and signed and faxed.  Lida Jacobo NP on 9/1/2023 at 4:12 PM

## 2023-09-01 NOTE — TELEPHONE ENCOUNTER
Forwarding [employer] request for letter to PCP.   Letter pended for signature if okay, then [facility] can fax to requested number.    Cyndi Fine RN  [facility]

## 2023-09-01 NOTE — LETTER
September 1, 2023      Wilfrido ELLSWORTH Blood  [address] [address]        To Whom It May Concern:    Wilfrido Echavarria is a patient of mine.  He currently takes the following medication, which will likely cause a positive amphetamine result on drug testing:          amphetamine-dextroamphetamine (ADDERALL XR) 10 MG 24 hr capsule        Sig - Route: Take 1 capsule (10 mg) by mouth daily - Oral         Sincerely,        Lida Jacobo, NP

## 2023-09-07 ENCOUNTER — OFFICE VISIT (OUTPATIENT)
Dept: FAMILY MEDICINE | Facility: CLINIC | Age: 23
End: 2023-09-07
Payer: COMMERCIAL

## 2023-09-07 VITALS
BODY MASS INDEX: 29.34 KG/M2 | RESPIRATION RATE: 16 BRPM | WEIGHT: 209.6 LBS | SYSTOLIC BLOOD PRESSURE: 124 MMHG | DIASTOLIC BLOOD PRESSURE: 76 MMHG | OXYGEN SATURATION: 98 % | TEMPERATURE: 97.8 F | HEIGHT: 71 IN | HEART RATE: 63 BPM

## 2023-09-07 DIAGNOSIS — F90.9 ATTENTION DEFICIT HYPERACTIVITY DISORDER (ADHD), UNSPECIFIED ADHD TYPE: Primary | ICD-10-CM

## 2023-09-07 DIAGNOSIS — R53.83 OTHER FATIGUE: ICD-10-CM

## 2023-09-07 DIAGNOSIS — N52.9 ERECTILE DYSFUNCTION, UNSPECIFIED ERECTILE DYSFUNCTION TYPE: ICD-10-CM

## 2023-09-07 LAB
HBA1C MFR BLD: 5.1 % (ref 0–5.6)
TSH SERPL DL<=0.005 MIU/L-ACNC: 2 UIU/ML (ref 0.3–4.2)

## 2023-09-07 PROCEDURE — 36415 COLL VENOUS BLD VENIPUNCTURE: CPT | Performed by: NURSE PRACTITIONER

## 2023-09-07 PROCEDURE — 83036 HEMOGLOBIN GLYCOSYLATED A1C: CPT | Performed by: NURSE PRACTITIONER

## 2023-09-07 PROCEDURE — 84443 ASSAY THYROID STIM HORMONE: CPT | Performed by: NURSE PRACTITIONER

## 2023-09-07 PROCEDURE — 99214 OFFICE O/P EST MOD 30 MIN: CPT | Performed by: NURSE PRACTITIONER

## 2023-09-07 PROCEDURE — 84403 ASSAY OF TOTAL TESTOSTERONE: CPT | Performed by: NURSE PRACTITIONER

## 2023-09-07 RX ORDER — DEXTROAMPHETAMINE SACCHARATE, AMPHETAMINE ASPARTATE MONOHYDRATE, DEXTROAMPHETAMINE SULFATE AND AMPHETAMINE SULFATE 5; 5; 5; 5 MG/1; MG/1; MG/1; MG/1
20 CAPSULE, EXTENDED RELEASE ORAL DAILY
Qty: 30 CAPSULE | Refills: 0 | Status: SHIPPED | OUTPATIENT
Start: 2023-10-08 | End: 2023-11-07

## 2023-09-07 RX ORDER — DEXTROAMPHETAMINE SACCHARATE, AMPHETAMINE ASPARTATE MONOHYDRATE, DEXTROAMPHETAMINE SULFATE AND AMPHETAMINE SULFATE 5; 5; 5; 5 MG/1; MG/1; MG/1; MG/1
20 CAPSULE, EXTENDED RELEASE ORAL DAILY
Qty: 30 CAPSULE | Refills: 0 | Status: SHIPPED | OUTPATIENT
Start: 2023-11-08 | End: 2023-11-30

## 2023-09-07 RX ORDER — DEXTROAMPHETAMINE SACCHARATE, AMPHETAMINE ASPARTATE, DEXTROAMPHETAMINE SULFATE AND AMPHETAMINE SULFATE 2.5; 2.5; 2.5; 2.5 MG/1; MG/1; MG/1; MG/1
10 TABLET ORAL DAILY
Qty: 30 TABLET | Refills: 0 | Status: SHIPPED | OUTPATIENT
Start: 2023-10-08 | End: 2023-11-07

## 2023-09-07 RX ORDER — DEXTROAMPHETAMINE SACCHARATE, AMPHETAMINE ASPARTATE, DEXTROAMPHETAMINE SULFATE AND AMPHETAMINE SULFATE 2.5; 2.5; 2.5; 2.5 MG/1; MG/1; MG/1; MG/1
10 TABLET ORAL DAILY
Qty: 30 TABLET | Refills: 0 | Status: SHIPPED | OUTPATIENT
Start: 2023-09-07 | End: 2023-10-07

## 2023-09-07 RX ORDER — DEXTROAMPHETAMINE SACCHARATE, AMPHETAMINE ASPARTATE MONOHYDRATE, DEXTROAMPHETAMINE SULFATE AND AMPHETAMINE SULFATE 5; 5; 5; 5 MG/1; MG/1; MG/1; MG/1
20 CAPSULE, EXTENDED RELEASE ORAL DAILY
Qty: 30 CAPSULE | Refills: 0 | Status: SHIPPED | OUTPATIENT
Start: 2023-09-07 | End: 2023-10-07

## 2023-09-07 RX ORDER — DEXTROAMPHETAMINE SACCHARATE, AMPHETAMINE ASPARTATE, DEXTROAMPHETAMINE SULFATE AND AMPHETAMINE SULFATE 2.5; 2.5; 2.5; 2.5 MG/1; MG/1; MG/1; MG/1
10 TABLET ORAL DAILY
Qty: 30 TABLET | Refills: 0 | Status: SHIPPED | OUTPATIENT
Start: 2023-11-08 | End: 2023-11-30

## 2023-09-07 RX ORDER — DEXTROAMPHETAMINE SACCHARATE, AMPHETAMINE ASPARTATE MONOHYDRATE, DEXTROAMPHETAMINE SULFATE AND AMPHETAMINE SULFATE 2.5; 2.5; 2.5; 2.5 MG/1; MG/1; MG/1; MG/1
10 CAPSULE, EXTENDED RELEASE ORAL DAILY
Qty: 30 CAPSULE | Refills: 0 | Status: CANCELLED | OUTPATIENT
Start: 2023-09-07

## 2023-09-07 ASSESSMENT — PAIN SCALES - GENERAL: PAINLEVEL: NO PAIN (0)

## 2023-09-07 ASSESSMENT — PATIENT HEALTH QUESTIONNAIRE - PHQ9
SUM OF ALL RESPONSES TO PHQ QUESTIONS 1-9: 0
10. IF YOU CHECKED OFF ANY PROBLEMS, HOW DIFFICULT HAVE THESE PROBLEMS MADE IT FOR YOU TO DO YOUR WORK, TAKE CARE OF THINGS AT HOME, OR GET ALONG WITH OTHER PEOPLE: NOT DIFFICULT AT ALL
SUM OF ALL RESPONSES TO PHQ QUESTIONS 1-9: 0

## 2023-09-07 NOTE — PROGRESS NOTES
Assessment & Plan     Attention deficit hyperactivity disorder (ADHD), unspecified ADHD type  Increasing to 20 mg XR with maintaining 10 mg afternoon dose.  Follow-up in 3 months for refills.  - amphetamine-dextroamphetamine (ADDERALL) 10 MG tablet; Take 1 tablet (10 mg) by mouth daily for 30 days  - amphetamine-dextroamphetamine (ADDERALL) 10 MG tablet; Take 1 tablet (10 mg) by mouth daily for 30 days  - amphetamine-dextroamphetamine (ADDERALL) 10 MG tablet; Take 1 tablet (10 mg) by mouth daily for 30 days  - amphetamine-dextroamphetamine (ADDERALL XR) 20 MG 24 hr capsule; Take 1 capsule (20 mg) by mouth daily for 30 days  - amphetamine-dextroamphetamine (ADDERALL XR) 20 MG 24 hr capsule; Take 1 capsule (20 mg) by mouth daily for 30 days  - amphetamine-dextroamphetamine (ADDERALL XR) 20 MG 24 hr capsule; Take 1 capsule (20 mg) by mouth daily for 30 days    Erectile dysfunction, unspecified erectile dysfunction type  Labs ordered.  If normal, urology consult placed for evaluation due to ED since he is so young for consultation.  - Testosterone, total; Future  - TSH with free T4 reflex; Future  - Hemoglobin A1c; Future  - Adult Urology  Referral; Future  - Testosterone, total  - TSH with free T4 reflex  - Hemoglobin A1c    Other fatigue  TSH ordered for fatigue but no weight gain since his weight is stable.     See Patient Instructions    Lida Jacobo NP  [facility]    Marilyn Anna is a 23 year old, presenting for the following health issues:  A.D.H.D and Flu Shot        9/7/2023     6:47 AM   Additional Questions   Roomed by Graeme BABB CMA   Accompanied by self       History of Present Illness       Reason for visit:  Medication    He eats 2-3 servings of fruits and vegetables daily.He consumes 1 sweetened beverage(s) daily.He exercises with enough effort to increase his heart rate 60 or more minutes per day.  He exercises with enough effort to increase his heart rate 5  "days per week.   He is taking medications regularly.       Medication Followup of Adderall   Taking Medication as prescribed: yes  Side Effects:  None  Medication Helping Symptoms:  yes  He has increased his dose to 20 mg of the XR and feels that this is better for him.  He would like to do this daily.    He also has been feeling more fatigued and having issues with getting an erection and keeping an erection at times.  He is wondering if this is hormonal.  He denies depression symptoms.    Review of Systems   CONSTITUTIONAL:POSITIVE  for fatigue  RESP: NEGATIVE for significant cough or SOB  CV: NEGATIVE for chest pain, palpitations or peripheral edema  : erectile dysfunction; has trouble sometimes getting an erection and keeping an erection  PSYCHIATRIC: NEGATIVE for changes in mood or affect  ROS otherwise negative      Objective    /76 (BP Location: Right arm, Patient Position: Sitting, Cuff Size: Adult Regular)   Pulse 63   Temp 97.8  F (36.6  C) (Tympanic)   Resp 16   Ht 1.803 m (5' 11\")   Wt 95.1 kg (209 lb 9.6 oz)   SpO2 98%   BMI 29.23 kg/m    Body mass index is 29.23 kg/m .  Physical Exam   GENERAL: healthy, alert and no distress  RESP: lungs clear to auscultation - no rales, rhonchi or wheezes  CV: regular rate and rhythm, normal S1 S2, no S3 or S4, no murmur, click or rub, no peripheral edema and peripheral pulses strong  PSYCH: mentation appears normal, affect normal/bright      "

## 2023-09-07 NOTE — LETTER
September 11, 2023      Wilfrido ELLSWORTH Blood  [address] [address]        Dear ,    We are writing to inform you of your test results.    Your testosterone level is low.  I would like to recheck this again in 3 months when you come in for your medication refill since this can be a one time low.  If still low we can talk about options for testosterone replacement to get this back in normal range.  Let me know if you have any questions.     Resulted Orders   Testosterone, total   Result Value Ref Range    Testosterone Total 196 (L) 240 - 950 ng/dL   TSH with free T4 reflex   Result Value Ref Range    TSH 2.00 0.30 - 4.20 uIU/mL   Hemoglobin A1c   Result Value Ref Range    Hemoglobin A1C 5.1 0.0 - 5.6 %      Comment:      Normal <5.7%   Prediabetes 5.7-6.4%    Diabetes 6.5% or higher     Note: Adopted from ADA consensus guidelines.       If you have any questions or concerns, please call the clinic at the number listed above.       Sincerely,      Lida Jacobo NP

## 2023-09-08 DIAGNOSIS — F90.9 ATTENTION DEFICIT HYPERACTIVITY DISORDER (ADHD), UNSPECIFIED ADHD TYPE: Primary | ICD-10-CM

## 2023-09-09 LAB — TESTOST SERPL-MCNC: 196 NG/DL (ref 240–950)

## 2023-09-09 RX ORDER — DEXTROAMPHETAMINE SACCHARATE, AMPHETAMINE ASPARTATE MONOHYDRATE, DEXTROAMPHETAMINE SULFATE AND AMPHETAMINE SULFATE 2.5; 2.5; 2.5; 2.5 MG/1; MG/1; MG/1; MG/1
20 CAPSULE, EXTENDED RELEASE ORAL DAILY
Qty: 60 CAPSULE | Refills: 0 | Status: SHIPPED | OUTPATIENT
Start: 2023-09-09 | End: 2023-10-09

## 2023-12-08 ENCOUNTER — DOCUMENTATION ONLY (OUTPATIENT)
Dept: FAMILY MEDICINE | Facility: CLINIC | Age: 23
End: 2023-12-08
Payer: COMMERCIAL

## 2023-12-08 DIAGNOSIS — R79.89 LOW TESTOSTERONE IN MALE: Primary | ICD-10-CM

## 2023-12-08 NOTE — PROGRESS NOTES
I signed labs,he needs Total Testosterone, will also check CBC and check LH and FSH levels to access pituitary function.    His thyroid function was normal, does not need to repeat.    I recommend morning appointment around 8 AM to better estimate his T level.    Schedule follow up with Lida to go over lab results, I think she will be okay if patient use same day appointment.    Thank you    SELMA Bills CNP , covering for PCP

## 2023-12-08 NOTE — PROGRESS NOTES
Pt was notified. He says d/t recent life events, he already has a plan in place for follow up with Lida Jacobo. Call transferred to central scheduling to see if any earlier lab appts available.     Reyna Flaherty RN  [facility]

## 2023-12-08 NOTE — PROGRESS NOTES
"Patient has lab only appt TOMORROW, Saturday 12/9/23 for \"Labs to check testosterone, free testosterone, TSH with free T4 reflex\", no orders in chart.  Please place FUTURE orders in Epic if appropriate.    Thanks,   [facility]    "

## 2023-12-09 ENCOUNTER — LAB (OUTPATIENT)
Dept: LAB | Facility: CLINIC | Age: 23
End: 2023-12-09
Payer: COMMERCIAL

## 2023-12-09 DIAGNOSIS — R79.89 LOW TESTOSTERONE IN MALE: ICD-10-CM

## 2023-12-09 LAB
ERYTHROCYTE [DISTWIDTH] IN BLOOD BY AUTOMATED COUNT: 11.9 % (ref 10–15)
HCT VFR BLD AUTO: 44.3 % (ref 40–53)
HGB BLD-MCNC: 15.7 G/DL (ref 13.3–17.7)
MCH RBC QN AUTO: 30.5 PG (ref 26.5–33)
MCHC RBC AUTO-ENTMCNC: 35.4 G/DL (ref 31.5–36.5)
MCV RBC AUTO: 86 FL (ref 78–100)
PLATELET # BLD AUTO: 293 10E3/UL (ref 150–450)
RBC # BLD AUTO: 5.14 10E6/UL (ref 4.4–5.9)
WBC # BLD AUTO: 5.4 10E3/UL (ref 4–11)

## 2023-12-09 PROCEDURE — 85027 COMPLETE CBC AUTOMATED: CPT

## 2023-12-09 PROCEDURE — 83002 ASSAY OF GONADOTROPIN (LH): CPT

## 2023-12-09 PROCEDURE — 83001 ASSAY OF GONADOTROPIN (FSH): CPT

## 2023-12-09 PROCEDURE — 36415 COLL VENOUS BLD VENIPUNCTURE: CPT

## 2023-12-09 PROCEDURE — 84403 ASSAY OF TOTAL TESTOSTERONE: CPT

## 2023-12-10 LAB
FSH SERPL IRP2-ACNC: 3.8 MIU/ML (ref 1.5–12.4)
LH SERPL-ACNC: 5.5 MIU/ML (ref 1.7–8.6)

## 2023-12-13 LAB — TESTOST SERPL-MCNC: 623 NG/DL (ref 240–950)

## 2023-12-19 ENCOUNTER — DOCUMENTATION ONLY (OUTPATIENT)
Dept: OTHER | Facility: CLINIC | Age: 23
End: 2023-12-19
Payer: COMMERCIAL

## 2024-01-08 ENCOUNTER — TELEPHONE (OUTPATIENT)
Dept: FAMILY MEDICINE | Facility: CLINIC | Age: 24
End: 2024-01-08
Payer: COMMERCIAL

## 2024-01-08 NOTE — TELEPHONE ENCOUNTER
Fatou from [facility] pharmacy is calling requesting clarification on Adderall XL ordered on 11/30/24.  Patient was ordered for both 20 mg and 10 mg capsules daily. Patient also ordered 10 mg tablet daily.    After review of chart on 9/7/23 visit increasing Adderall to 20 mg XR with maintaining 10 mg afternoon dose. To follow up in 3 months.  Patient has appointment with Lida Jacobo on 2/8/24 for medication review.    Pharmacy requesting refills on appropriate doses.    Thank you,  Annita Gordon RN

## 2024-01-08 NOTE — TELEPHONE ENCOUNTER
Please clarify that this is 20 mg XR in the morning and 10 mg immediate release in the afternoon.  Thanks,  Lida Jacobo NP on 1/8/2024 at 3:49 PM

## 2024-01-09 NOTE — TELEPHONE ENCOUNTER
Called and spoke with pt. Pt stated he is taking 20 mg XR in the morning and 10 mg IR in the afternoon. Will route to PCP.    FUAD Murry.

## 2024-01-09 NOTE — TELEPHONE ENCOUNTER
Clarified this with pharmacy and correct dose reflected on Medication list.  Lida Jacobo NP on 1/9/2024 at 3:55 PM

## 2024-01-11 DIAGNOSIS — F90.9 ATTENTION DEFICIT HYPERACTIVITY DISORDER (ADHD), UNSPECIFIED ADHD TYPE: ICD-10-CM

## 2024-01-11 RX ORDER — DEXTROAMPHETAMINE SACCHARATE, AMPHETAMINE ASPARTATE MONOHYDRATE, DEXTROAMPHETAMINE SULFATE AND AMPHETAMINE SULFATE 5; 5; 5; 5 MG/1; MG/1; MG/1; MG/1
20 CAPSULE, EXTENDED RELEASE ORAL DAILY
Qty: 30 CAPSULE | Refills: 0 | Status: SHIPPED | OUTPATIENT
Start: 2024-01-11 | End: 2024-02-09

## 2024-01-11 RX ORDER — DEXTROAMPHETAMINE SACCHARATE, AMPHETAMINE ASPARTATE, DEXTROAMPHETAMINE SULFATE AND AMPHETAMINE SULFATE 2.5; 2.5; 2.5; 2.5 MG/1; MG/1; MG/1; MG/1
10 TABLET ORAL DAILY
Qty: 30 TABLET | Refills: 0 | Status: SHIPPED | OUTPATIENT
Start: 2024-01-11 | End: 2024-02-09

## 2024-01-11 NOTE — TELEPHONE ENCOUNTER
Pending Prescriptions:                       Disp   Refills    amphetamine-dextroamphetamine (ADDERALL) *30 tab*0            Sig: Take 1 tablet (10 mg) by mouth daily    amphetamine-dextroamphetamine (ADDERALL X*30 cap*0            Sig: Take 1 capsule (20 mg) by mouth daily

## 2024-01-11 NOTE — TELEPHONE ENCOUNTER
Routing refill request to provider for review/approval because:  Drug not on the [facility] refill protocol   Last Written Prescription Date:  11/30/23  Last Fill Quantity: 30,  # refills: 0   Last office visit: 9/7/2023 ; last virtual visit: 1/13/2023 with prescribing provider:  0   Future Office Visit:   Next 5 appointments (look out 90 days)      Feb 08, 2024  8:30 AM  (Arrive by 8:10 AM)  Provider Visit with Lida Jacobo NP  [facility] ([facility] )  Arrive at: Clinic [facility] [address] [facility] [address]  [phone]           Julie Behrendt RN

## 2024-02-08 ENCOUNTER — MYC MEDICAL ADVICE (OUTPATIENT)
Dept: FAMILY MEDICINE | Facility: CLINIC | Age: 24
End: 2024-02-08

## 2024-02-08 DIAGNOSIS — F90.9 ATTENTION DEFICIT HYPERACTIVITY DISORDER (ADHD), UNSPECIFIED ADHD TYPE: ICD-10-CM

## 2024-02-09 RX ORDER — DEXTROAMPHETAMINE SACCHARATE, AMPHETAMINE ASPARTATE, DEXTROAMPHETAMINE SULFATE AND AMPHETAMINE SULFATE 2.5; 2.5; 2.5; 2.5 MG/1; MG/1; MG/1; MG/1
10 TABLET ORAL DAILY
Qty: 30 TABLET | Refills: 0 | Status: SHIPPED | OUTPATIENT
Start: 2024-02-09 | End: 2024-03-05

## 2024-02-09 RX ORDER — DEXTROAMPHETAMINE SACCHARATE, AMPHETAMINE ASPARTATE MONOHYDRATE, DEXTROAMPHETAMINE SULFATE AND AMPHETAMINE SULFATE 5; 5; 5; 5 MG/1; MG/1; MG/1; MG/1
20 CAPSULE, EXTENDED RELEASE ORAL DAILY
Qty: 30 CAPSULE | Refills: 0 | Status: SHIPPED | OUTPATIENT
Start: 2024-02-09 | End: 2024-03-05 | Stop reason: ALTCHOICE

## 2024-02-27 SDOH — HEALTH STABILITY: PHYSICAL HEALTH: ON AVERAGE, HOW MANY DAYS PER WEEK DO YOU ENGAGE IN MODERATE TO STRENUOUS EXERCISE (LIKE A BRISK WALK)?: 5 DAYS

## 2024-02-27 SDOH — HEALTH STABILITY: PHYSICAL HEALTH: ON AVERAGE, HOW MANY MINUTES DO YOU ENGAGE IN EXERCISE AT THIS LEVEL?: 50 MIN

## 2024-02-27 ASSESSMENT — SOCIAL DETERMINANTS OF HEALTH (SDOH): HOW OFTEN DO YOU GET TOGETHER WITH FRIENDS OR RELATIVES?: NEVER

## 2024-02-27 NOTE — COMMUNITY RESOURCES LIST (ENGLISH)
02/27/2024   [facility]  N/A  For questions about this resource list or additional care needs, please contact your primary care clinic or care manager.  Phone: [phone]   Email: N/A   Address: [address] [place] [id number]   Hours: N/A        Food and Nutrition       Food pantry  1  [address] [place] [address] Distance: 1.63 miles      In-Person, Pickup   [address] [place] [address]  Language: English  Hours: Mon 8:00 AM - 4:00 PM , Tue 8:00 AM - 7:00 PM , Wed - Thu 8:00 AM - 4:00 PM  Fees: Free   Phone: (362) 588-6317 Email: info@[employer] Website: https://[employer]/resources/resource-centers/     2  [facility] Distance: 1.64 miles      In-Person   [address] [place] [address]  Language: English, [place]ese  Hours: Mon - Fri 5:00 AM - 10:00 PM  Fees: Free   Phone: (484) 849-4804 Email: admissions@[facility] Website: http://www.[facility]     SNAP application assistance  3  [employer] ([facility] Distance: 0.55 miles      In-Person   [address] [address]  Language: English, [place]ese  Hours: Mon - Fri 8:00 AM - 8:00 PM  Fees: Free   Phone: (369) 912-6454 Email: info@[facility].org Website: http://www.[employer].org     4  [address] [place] [address] Distance: 1.63 miles      In-Person   [address] [place] [address]  Language: English  Hours: Mon 8:00 AM - 4:00 PM , Tue 8:00 AM - 7:00 PM , Wed - Thu 8:00 AM - 4:00 PM  Fees: Free   Phone: (952) 536-6859 Email: info@[employer] Website: https://[employer]/resources/resource-centers/     Soup kitchen or free meals  5  [facility] Distance: 4.3 miles      Pickup   [address] [place] [address]  Language: English, [place]ese  Hours: Mon - Thu 5:30 PM - 6:30 PM  Fees: Free   Phone:  (685) 746-2951 Email: julio@[employer] Website: http://[employer]/wordpress/?page_id=5168     6  [facility] Distance: 8.57 miles      In-Person, Pickup   [address] [place] [address]  Language: English  Hours: Mon - Thu 5:00 PM - 6:30 PM  Fees: Free   Phone: (980) 794-5726 Email: office@[employer] Website: http://[employer]/          Important Numbers & Websites       Emergency Services   911  [facility]   311  Poison Control   (499) 444-7688  Suicide Prevention Lifeline   (245) 206-2375 (TALK)  Child Abuse Hotline   (830) 679-1891 (4-A-Child)  Sexual Assault Hotline   (536) 856-6918 (HOPE)  National Runaway Safeline   (412) 452-5903 (RUNAWAY)  All-Options Talkline   (768) 626-2576  Substance Abuse Referral   (185) 593-2095 (HELP)

## 2024-03-05 ENCOUNTER — OFFICE VISIT (OUTPATIENT)
Dept: FAMILY MEDICINE | Facility: CLINIC | Age: 24
End: 2024-03-05
Payer: COMMERCIAL

## 2024-03-05 VITALS
BODY MASS INDEX: 25.54 KG/M2 | SYSTOLIC BLOOD PRESSURE: 132 MMHG | DIASTOLIC BLOOD PRESSURE: 84 MMHG | RESPIRATION RATE: 16 BRPM | HEART RATE: 84 BPM | TEMPERATURE: 97.3 F | HEIGHT: 71 IN | OXYGEN SATURATION: 99 % | WEIGHT: 182.4 LBS

## 2024-03-05 DIAGNOSIS — F90.9 ATTENTION DEFICIT HYPERACTIVITY DISORDER (ADHD), UNSPECIFIED ADHD TYPE: ICD-10-CM

## 2024-03-05 DIAGNOSIS — Z00.00 ROUTINE GENERAL MEDICAL EXAMINATION AT A HEALTH CARE FACILITY: Primary | ICD-10-CM

## 2024-03-05 PROBLEM — M54.30: Status: ACTIVE | Noted: 2023-01-01

## 2024-03-05 LAB
AMPHETAMINES UR QL: DETECTED
BARBITURATES UR QL SCN: NOT DETECTED
BENZODIAZ UR QL SCN: NOT DETECTED
BUPRENORPHINE UR QL: NOT DETECTED
CANNABINOIDS UR QL: DETECTED
COCAINE UR QL SCN: NOT DETECTED
D-METHAMPHET UR QL: NOT DETECTED
METHADONE UR QL SCN: NOT DETECTED
OPIATES UR QL SCN: NOT DETECTED
OXYCODONE UR QL SCN: NOT DETECTED
PCP UR QL SCN: NOT DETECTED
TRICYCLICS UR QL SCN: NOT DETECTED

## 2024-03-05 PROCEDURE — 99395 PREV VISIT EST AGE 18-39: CPT | Performed by: NURSE PRACTITIONER

## 2024-03-05 PROCEDURE — 99214 OFFICE O/P EST MOD 30 MIN: CPT | Mod: 25 | Performed by: NURSE PRACTITIONER

## 2024-03-05 PROCEDURE — 80306 DRUG TEST PRSMV INSTRMNT: CPT | Performed by: NURSE PRACTITIONER

## 2024-03-05 RX ORDER — DEXTROAMPHETAMINE SACCHARATE, AMPHETAMINE ASPARTATE, DEXTROAMPHETAMINE SULFATE AND AMPHETAMINE SULFATE 5; 5; 5; 5 MG/1; MG/1; MG/1; MG/1
20 TABLET ORAL EVERY MORNING
Qty: 30 TABLET | Refills: 0 | Status: SHIPPED | OUTPATIENT
Start: 2024-04-05 | End: 2024-05-05

## 2024-03-05 RX ORDER — DEXTROAMPHETAMINE SACCHARATE, AMPHETAMINE ASPARTATE, DEXTROAMPHETAMINE SULFATE AND AMPHETAMINE SULFATE 2.5; 2.5; 2.5; 2.5 MG/1; MG/1; MG/1; MG/1
10 TABLET ORAL DAILY
Qty: 30 TABLET | Refills: 0 | Status: SHIPPED | OUTPATIENT
Start: 2024-03-05 | End: 2024-04-04

## 2024-03-05 RX ORDER — DEXTROAMPHETAMINE SACCHARATE, AMPHETAMINE ASPARTATE, DEXTROAMPHETAMINE SULFATE AND AMPHETAMINE SULFATE 5; 5; 5; 5 MG/1; MG/1; MG/1; MG/1
20 TABLET ORAL EVERY MORNING
Qty: 30 TABLET | Refills: 0 | Status: SHIPPED | OUTPATIENT
Start: 2024-05-06 | End: 2024-06-19

## 2024-03-05 RX ORDER — DEXTROAMPHETAMINE SACCHARATE, AMPHETAMINE ASPARTATE, DEXTROAMPHETAMINE SULFATE AND AMPHETAMINE SULFATE 2.5; 2.5; 2.5; 2.5 MG/1; MG/1; MG/1; MG/1
10 TABLET ORAL DAILY
Qty: 30 TABLET | Refills: 0 | Status: SHIPPED | OUTPATIENT
Start: 2024-05-06 | End: 2024-06-19

## 2024-03-05 RX ORDER — DEXTROAMPHETAMINE SACCHARATE, AMPHETAMINE ASPARTATE, DEXTROAMPHETAMINE SULFATE AND AMPHETAMINE SULFATE 2.5; 2.5; 2.5; 2.5 MG/1; MG/1; MG/1; MG/1
10 TABLET ORAL DAILY
Qty: 30 TABLET | Refills: 0 | Status: SHIPPED | OUTPATIENT
Start: 2024-04-05 | End: 2024-05-05

## 2024-03-05 RX ORDER — DEXTROAMPHETAMINE SACCHARATE, AMPHETAMINE ASPARTATE, DEXTROAMPHETAMINE SULFATE AND AMPHETAMINE SULFATE 5; 5; 5; 5 MG/1; MG/1; MG/1; MG/1
20 TABLET ORAL EVERY MORNING
Qty: 30 TABLET | Refills: 0 | Status: SHIPPED | OUTPATIENT
Start: 2024-03-05 | End: 2024-04-04

## 2024-03-05 ASSESSMENT — PAIN SCALES - GENERAL: PAINLEVEL: EXTREME PAIN (8)

## 2024-03-05 ASSESSMENT — PATIENT HEALTH QUESTIONNAIRE - PHQ9
SUM OF ALL RESPONSES TO PHQ QUESTIONS 1-9: 3
SUM OF ALL RESPONSES TO PHQ QUESTIONS 1-9: 3
10. IF YOU CHECKED OFF ANY PROBLEMS, HOW DIFFICULT HAVE THESE PROBLEMS MADE IT FOR YOU TO DO YOUR WORK, TAKE CARE OF THINGS AT HOME, OR GET ALONG WITH OTHER PEOPLE: SOMEWHAT DIFFICULT

## 2024-03-05 NOTE — PATIENT INSTRUCTIONS
Preventive Care Advice   This is general advice given by our system to help you stay healthy. However, your care team may have specific advice just for you. Please talk to your care team about your preventive care needs.  Nutrition  Eat 5 or more servings of fruits and vegetables each day.  Try wheat bread, brown rice and whole grain pasta (instead of white bread, rice, and pasta).  Get enough calcium and vitamin D. Check the label on foods and aim for 100% of the RDA (recommended daily allowance).  Lifestyle  Exercise at least 150 minutes each week   (30 minutes a day, 5 days a week).  Do muscle strengthening activities 2 days a week. These help control your weight and prevent disease.  No smoking.  Wear sunscreen to prevent skin cancer.  Have a dental exam and cleaning every 6 months.  Yearly exams  See your health care team every year to talk about:  Any changes in your health.  Any medicines your care team has prescribed.  Preventive care, family planning, and ways to prevent chronic diseases.  Shots (vaccines)   HPV shots (up to age 26), if you've never had them before.  Hepatitis B shots (up to age 59), if you've never had them before.  COVID-19 shot: Get this shot when it's due.  Flu shot: Get a flu shot every year.  Tetanus shot: Get a tetanus shot every 10 years.  Pneumococcal, hepatitis A, and RSV shots: Ask your care team if you need these based on your risk.  Shingles shot (for age 50 and up).  General health tests  Diabetes screening:  Starting at age 35, Get screened for diabetes at least every 3 years.  If you are younger than age 35, ask your care team if you should be screened for diabetes.  Cholesterol test: At age 39, start having a cholesterol test every 5 years, or more often if advised.  Bone density scan (DEXA): At age 50, ask your care team if you should have this scan for osteoporosis (brittle bones).  Hepatitis C: Get tested at least once in your life.  STIs (sexually transmitted  infections)  Before age 24: Ask your care team if you should be screened for STIs.  After age 24: Get screened for STIs if you're at risk. You are at risk for STIs (including HIV) if:  You are sexually active with more than one person.  You don't use condoms every time.  You or a partner was diagnosed with a sexually transmitted infection.  If you are at risk for HIV, ask about PrEP medicine to prevent HIV.  Get tested for HIV at least once in your life, whether you are at risk for HIV or not.  Cancer screening tests  Cervical cancer screening: If you have a cervix, begin getting regular cervical cancer screening tests at age 21. Most people who have regular screenings with normal results can stop after age 65. Talk about this with your provider.  Breast cancer scan (mammogram): If you've ever had breasts, begin having regular mammograms starting at age 40. This is a scan to check for breast cancer.  Colon cancer screening: It is important to start screening for colon cancer at age 45.  Have a colonoscopy test every 10 years (or more often if you're at risk) Or, ask your provider about stool tests like a FIT test every year or Cologuard test every 3 years.  To learn more about your testing options, visit: https://[email or web address]/038175.pdf.  For help making a decision, visit: https://bit.ly/cx28668.  Prostate cancer screening test: If you have a prostate and are age 55 to 69, ask your provider if you would benefit from a yearly prostate cancer screening test.  Lung cancer screening: If you are a current or former smoker age 50 to 80, ask your care team if ongoing lung cancer screenings are right for you.  For informational purposes only. Not to replace the advice of your health care provider. Copyright   2023 [employer]. All rights reserved. Clinically reviewed by the [facility] Transitions Program. [employer] 978104 - REV 01/24.    Relationships for Good Health  Relationships are important for  our health and happiness. Social isolation, loneliness and lack of support are bad for your health. Studies show that loneliness can harm health and limit your life span as much as high blood pressure and smoking.   Take some time to reflect on your relationships. Then answer these questions:  Are there people in your life that cause you stress or drain your energy? What can you do to set limits?  ________________________________________________________________________________________________________________________________________________________________________________________________________________________________________________________________________________________________________________________________________________  Who do you enjoy spending time with? Who can you go to for support?  ________________________________________________________________________________________________________________________________________________________________________________________________________________________________________________________________________________________________________________________________________________  What can you do to improve your relationships with others?  __________________________________________________________________________________________________________________________________________________________________________________________________________________  ______________________________________________________________________________________________________________________________  What do you like most about your relationships with others?  ________________________________________________________________________________________________________________________________________________________________________________________________________________________________________________________________________________________________________________________________________________  My  goal: ______________________________________________________________________  I will ______________________________________________________________________________________________________________________________________________________________________________________________    For informational purposes only. Not to replace the advice of your health care provider. Copyright   2018 [facility]. All rights reserved. Clinically reviewed by Bariatric Health  Team. [employer] 226460 - Rev 04/21.    Learning About Stress  What is stress?     Stress is your body's response to a hard situation. Your body can have a physical, emotional, or mental response. Stress is a fact of life for most people, and it affects everyone differently. What causes stress for you may not be stressful for someone else.  A lot of things can cause stress. You may feel stress when you go on a job interview, take a test, or run a race. This kind of short-term stress is normal and even useful. It can help you if you need to work hard or react quickly. For example, stress can help you finish an important job on time.  Long-term stress is caused by ongoing stressful situations or events. Examples of long-term stress include long-term health problems, ongoing problems at work, or conflicts in your family. Long-term stress can harm your health.  How does stress affect your health?  When you are stressed, your body responds as though you are in danger. It makes hormones that speed up your heart, make you breathe faster, and give you a burst of energy. This is called the fight-or-flight stress response. If the stress is over quickly, your body goes back to normal and no harm is done.  But if stress happens too often or lasts too long, it can have bad effects. Long-term stress can make you more likely to get sick, and it can make symptoms of some diseases worse. If you tense up when you are stressed, you may develop neck, shoulder, or low  back pain. Stress is linked to high blood pressure and heart disease.  Stress also harms your emotional health. It can make you narvaez, tense, or depressed. Your relationships may suffer, and you may not do well at work or school.  What can you do to manage stress?  You can try these things to help manage stress:   Do something active. Exercise or activity can help reduce stress. Walking is a great way to get started. Even everyday activities such as housecleaning or yard work can help.  Try yoga or idalia chi. These techniques combine exercise and meditation. You may need some training at first to learn them.  Do something you enjoy. For example, listen to music or go to a movie. Practice your hobby or do volunteer work.  Meditate. This can help you relax, because you are not worrying about what happened before or what may happen in the future.  Do guided imagery. Imagine yourself in any setting that helps you feel calm. You can use online videos, books, or a teacher to guide you.  Do breathing exercises. For example:  From a standing position, bend forward from the waist with your knees slightly bent. Let your arms dangle close to the floor.  Breathe in slowly and deeply as you return to a standing position. Roll up slowly and lift your head last.  Hold your breath for just a few seconds in the standing position.  Breathe out slowly and bend forward from the waist.  Let your feelings out. Talk, laugh, cry, and express anger when you need to. Talking with supportive friends or family, a counselor, or a cash leader about your feelings is a healthy way to relieve stress. Avoid discussing your feelings with people who make you feel worse.  Write. It may help to write about things that are bothering you. This helps you find out how much stress you feel and what is causing it. When you know this, you can find better ways to cope.  What can you do to prevent stress?  You might try some of these things to help prevent  "stress:  Manage your time. This helps you find time to do the things you want and need to do.  Get enough sleep. Your body recovers from the stresses of the day while you are sleeping.  Get support. Your family, friends, and community can make a difference in how you experience stress.  Limit your news feed. Avoid or limit time on social media or news that may make you feel stressed.  Do something active. Exercise or activity can help reduce stress. Walking is a great way to get started.  Where can you learn more?  Go to https://www.[employer].net/patiented  Enter N032 in the search box to learn more about \"Learning About Stress.\"  Current as of: February 26, 2023               Content Version: 13.8    0400-5848 [employer].   Care instructions adapted under license by your healthcare professional. If you have questions about a medical condition or this instruction, always ask your healthcare professional. [employer] disclaims any warranty or liability for your use of this information.      "
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
## 2024-03-05 NOTE — LETTER
[facility]  03/05/24  Patient: Wilfrido Echavarria  YOB: 2000  Medical Record Number: [id number]                                                                                  Non-Opioid Controlled Substance Agreement    This is an agreement between you and your provider regarding safe and appropriate use of controlled substances prescribed by your care team. Controlled substances are?medicines that can cause physical and mental dependence (abuse).     There are strict laws about having and using these medicines. We here at [facility] are  committed to working with you in your efforts to get better. To support you in this work, we'll help you schedule regular office appointments for medicine refills. If we must cancel or change your appointment for any reason, we'll make sure you have enough medicine to last until your next appointment.     As a Provider, I will:   Listen carefully to your concerns while treating you with respect.   Recommend a treatment plan that I believe is in your best interest and may involve therapies other than medicine.    Talk with you often about the possible benefits and the risk of harm of any medicine that we prescribe for you.  Assess the safety of this medicine and check how well it works.    Provide a plan on how to taper (discontinue or go off) using this medicine if the decision is made to stop its use.      ::  As a Patient, I understand controlled substances:     Are prescribed by my care provider to help me function or work and manage my condition(s).?  Are strong medicines and can cause serious side effects.     Need to be taken exactly as prescribed.?Combining controlled substances with certain medicines or chemicals (such as illegal drugs, alcohol, sedatives, sleeping pills, and benzodiazepines) can be dangerous or even fatal.? If I stop taking my medicines suddenly, I may have severe withdrawal symptoms.     The risks, benefits,  and side effects of these medicine(s) were explained to me. I agree that:    I will take part in other treatments as advised by my care team. This may be psychiatry or counseling, physical therapy, behavioral therapy, group treatment or a referral to specialist.    I will keep all my appointments and understand this is part of the monitoring of controlled substances.?My care team may require an office visit for EVERY controlled substance refill. If I miss appointments or don t follow instructions, my care team may stop my medicine    I will take my medicines as prescribed. I will not change the dose or schedule unless my care team tells me to. There will be no refills if I run out early.      I may be asked to come to the clinic and complete a urine drug test or complete a pill count. If I don t give a urine sample or participate in a pill count, the care team may stop my medicine.    I will only receive controlled substance prescriptions from this clinic. If I am treated by another provider, I will tell them that I am taking controlled substances and that I have a treatment agreement with this provider. I will inform my [facility] care team within one business day if I am given a prescription for any controlled substance by another healthcare provider. My [facility] care team can contact other providers and pharmacists about my use of any medicines.    It is up to me to make sure that I don't run out of my medicines on weekends or holidays.?If my care team is willing to refill my prescription without a visit, I must request refills only during office hours. Refills may take up to 3 business days to process. I will use one pharmacy to fill all my controlled substance prescriptions. I will notify the clinic about any changes to my insurance or medicine availability.    I am responsible for my prescriptions. If the medicine/prescription is lost, stolen or destroyed, it will not be replaced.?I also agree  not to share controlled substance medicines with anyone.     I am aware I should not use any illegal or recreational drugs. I agree not to drink alcohol unless my care team says I can.     If I enroll in the [place] Medical Cannabis program, I will tell my care team before my next refill.    I will tell my care team right away if I become pregnant, have a new medical problem treated outside of my regular clinic, or have a change in my medicines.     I understand that this medicine can affect my thinking, judgment and reaction time.? Alcohol and drugs affect the brain and body, which can affect the safety of my driving. Being under the influence of alcohol or drugs can affect my decision-making, behaviors, personal safety and the safety of others. Driving while impaired (DWI) can occur if a person is driving, operating or in physical control of a car, motorcycle, boat, snowmobile, ATV, motorbike, off-road vehicle or any other motor vehicle ([place] Statute 169A.20). I understand the risk if I choose to drive or operate any vehicle or machinery.    I understand that if I do not follow any of the conditions above, my prescriptions or treatment may be stopped or changed.   I agree that my provider, clinic care team and pharmacy may work with any city, state or federal law enforcement agency that investigates the misuse, sale or other diversion of my controlled medicine. I will allow my provider to discuss my care with, or share a copy of, this agreement with any other treating provider, pharmacy or emergency room where I receive care.     I have read this agreement and have asked questions about anything I did not understand.    ________________________________________________________  Patient Signature - Wilfrido ELLSWORTH Blood     ___________________                   Date     ________________________________________________________  Provider Signature - Lida Jacobo, NP       ___________________                   Date      ________________________________________________________  Witness Signature (required if provider not present while patient signing)          ___________________                   Date

## 2024-05-09 NOTE — TELEPHONE ENCOUNTER
Per pharmacy --amphetamine-dextroamphetamine (ADDERALL XR) 20 MG 24 hr capsule are on back order. Please send Rx for 10 mg 2 every day  Thanks!  
Please see message below. The Adderall 20 mg XR is on back order would like new script for Adderall 10 mg XR.    Medication pended      Thank you    Lida DENTON RN     
negative...

## 2024-06-03 ENCOUNTER — OFFICE VISIT (OUTPATIENT)
Dept: PEDIATRICS | Facility: CLINIC | Age: 24
End: 2024-06-03
Payer: COMMERCIAL

## 2024-06-03 VITALS
WEIGHT: 199 LBS | HEART RATE: 76 BPM | HEIGHT: 72 IN | DIASTOLIC BLOOD PRESSURE: 54 MMHG | BODY MASS INDEX: 26.95 KG/M2 | SYSTOLIC BLOOD PRESSURE: 120 MMHG | TEMPERATURE: 98.1 F | OXYGEN SATURATION: 100 % | RESPIRATION RATE: 16 BRPM

## 2024-06-03 DIAGNOSIS — Z82.49 FAMILY HISTORY OF HYPERTENSION: ICD-10-CM

## 2024-06-03 DIAGNOSIS — G57.01 PIRIFORMIS SYNDROME, RIGHT: ICD-10-CM

## 2024-06-03 DIAGNOSIS — F90.9 ATTENTION DEFICIT HYPERACTIVITY DISORDER (ADHD), UNSPECIFIED ADHD TYPE: ICD-10-CM

## 2024-06-03 DIAGNOSIS — Z76.89 ENCOUNTER TO ESTABLISH CARE: Primary | ICD-10-CM

## 2024-06-03 PROCEDURE — 99214 OFFICE O/P EST MOD 30 MIN: CPT | Performed by: NURSE PRACTITIONER

## 2024-06-03 ASSESSMENT — PAIN SCALES - GENERAL: PAINLEVEL: NO PAIN (0)

## 2024-06-03 NOTE — PROGRESS NOTES
"  Assessment & Plan     Encounter to establish care  Histories and medications reviewed and updated.     Attention deficit hyperactivity disorder (ADHD), unspecified ADHD type  Formulation and dose adjusted three months ago. Does not need refills at this time. Currently taking Adderall IR 20 mg in the am and Adderall IR 10 mg in the pm. Doesn't feel his symptoms are managed on his current doses. Recommend referral to [facility] for medication optimization, which he is agreeable to. We did update his controlled substance agreement today.   - Adult Mental Health  Referral; Future    Family history of hypertension  Hx hypertension in five brothers. His BP is normal. Will continue to monitor.   - Lipid panel reflex to direct LDL Fasting; Future  - Glucose; Future    Piriformis syndrome, right  No known trauma or injury. Referral for PT.   - Physical Therapy  Referral; Future          Marilyn Anna is a 23 year old, presenting for the following health issues:  Recheck Medication        6/3/2024     1:46 PM   Additional Questions   Roomed by Janie RIOS CMA   Accompanied by Self         6/3/2024     1:46 PM   Patient Reported Additional Medications   Patient reports taking the following new medications n/a       Presents to establish primary care. Pt would like to discuss family history of high BP and sometimes gets elevated HR alerts on his apple watch.     #adhd  Recently adjusted adderall doses 3/5/2024 - adderall IR 20 mg in am and 10 mg in pm. Feels like he reads things without processing, unable to focus, fidgeting. Has felt this way for the past 3 weeks. Whenever he makes a mediation change with his Adderall, he feels that the positive effects only work for a month or so before \"wearing off.\"  Was on XR previously with IR booster dose in the afternoons - XR wore off too fast and IR booster was not effective.  Strattera - rash on his chest    Was on sertraline, wellbutrin, and trazodone in high " school.   Mood is great now.     #family history of diabetes  Did a 23 and me genetic test, opted in for the health screening. His genetic testing results showed that he had a 77% likelihood of developing diabetes.     Family history of hypertension in several brothers.       Objective    /54   Pulse 76   Temp 98.1  F (36.7  C) (Temporal)   Resp 16   Ht 1.829 m (6')   Wt 90.3 kg (199 lb)   SpO2 100%   BMI 26.99 kg/m    Body mass index is 26.99 kg/m .  Physical Exam  Constitutional:       General: He is not in acute distress.     Appearance: Normal appearance. He is not ill-appearing or toxic-appearing.   Cardiovascular:      Rate and Rhythm: Normal rate.   Pulmonary:      Effort: Pulmonary effort is normal. No respiratory distress.   Skin:     General: Skin is warm and dry.   Neurological:      General: No focal deficit present.      Mental Status: He is alert and oriented to person, place, and time.   Psychiatric:         Mood and Affect: Mood normal.         Behavior: Behavior normal.         Thought Content: Thought content normal.         Judgment: Judgment normal.              Signed Electronically by: SELMA Bynum CNP

## 2024-06-03 NOTE — LETTER
[facility]  06/03/24  Patient: Wilfrido Echavarria  YOB: 2000  Medical Record Number: [id number]                                                                                  Non-Opioid Controlled Substance Agreement    This is an agreement between you and your provider regarding safe and appropriate use of controlled substances prescribed by your care team. Controlled substances are?medicines that can cause physical and mental dependence (abuse).     There are strict laws about having and using these medicines. We here at [facility] are  committed to working with you in your efforts to get better. To support you in this work, we'll help you schedule regular office appointments for medicine refills. If we must cancel or change your appointment for any reason, we'll make sure you have enough medicine to last until your next appointment.     As a Provider, I will:   Listen carefully to your concerns while treating you with respect.   Recommend a treatment plan that I believe is in your best interest and may involve therapies other than medicine.    Talk with you often about the possible benefits and the risk of harm of any medicine that we prescribe for you.  Assess the safety of this medicine and check how well it works.    Provide a plan on how to taper (discontinue or go off) using this medicine if the decision is made to stop its use.      ::  As a Patient, I understand controlled substances:     Are prescribed by my care provider to help me function or work and manage my condition(s).?  Are strong medicines and can cause serious side effects.     Need to be taken exactly as prescribed.?Combining controlled substances with certain medicines or chemicals (such as illegal drugs, alcohol, sedatives, sleeping pills, and benzodiazepines) can be dangerous or even fatal.? If I stop taking my medicines suddenly, I may have severe withdrawal symptoms.     The risks, benefits, and side  effects of these medicine(s) were explained to me. I agree that:    I will take part in other treatments as advised by my care team. This may be psychiatry or counseling, physical therapy, behavioral therapy, group treatment or a referral to specialist.    I will keep all my appointments and understand this is part of the monitoring of controlled substances.?My care team may require an office visit for EVERY controlled substance refill. If I miss appointments or don t follow instructions, my care team may stop my medicine    I will take my medicines as prescribed. I will not change the dose or schedule unless my care team tells me to. There will be no refills if I run out early.      I may be asked to come to the clinic and complete a urine drug test or complete a pill count. If I don t give a urine sample or participate in a pill count, the care team may stop my medicine.    I will only receive controlled substance prescriptions from this clinic. If I am treated by another provider, I will tell them that I am taking controlled substances and that I have a treatment agreement with this provider. I will inform my [facility] care team within one business day if I am given a prescription for any controlled substance by another healthcare provider. My [facility] care team can contact other providers and pharmacists about my use of any medicines.    It is up to me to make sure that I don't run out of my medicines on weekends or holidays.?If my care team is willing to refill my prescription without a visit, I must request refills only during office hours. Refills may take up to 3 business days to process. I will use one pharmacy to fill all my controlled substance prescriptions. I will notify the clinic about any changes to my insurance or medicine availability.    I am responsible for my prescriptions. If the medicine/prescription is lost, stolen or destroyed, it will not be replaced.?I also agree not to  share controlled substance medicines with anyone.     I am aware I should not use any illegal or recreational drugs. I agree not to drink alcohol unless my care team says I can.     If I enroll in the [place] [facility] program, I will tell my care team before my next refill.    I will tell my care team right away if I become pregnant, have a new medical problem treated outside of my regular clinic, or have a change in my medicines.     I understand that this medicine can affect my thinking, judgment and reaction time.? Alcohol and drugs affect the brain and body, which can affect the safety of my driving. Being under the influence of alcohol or drugs can affect my decision-making, behaviors, personal safety and the safety of others. Driving while impaired (DWI) can occur if a person is driving, operating or in physical control of a car, motorcycle, boat, snowmobile, ATV, motorbike, off-road vehicle or any other motor vehicle ([place] Statute 169A.20). I understand the risk if I choose to drive or operate any vehicle or machinery.    I understand that if I do not follow any of the conditions above, my prescriptions or treatment may be stopped or changed.   I agree that my provider, clinic care team and pharmacy may work with any city, state or federal law enforcement agency that investigates the misuse, sale or other diversion of my controlled medicine. I will allow my provider to discuss my care with, or share a copy of, this agreement with any other treating provider, pharmacy or emergency room where I receive care.     I have read this agreement and have asked questions about anything I did not understand.    ________________________________________________________  Patient Signature - Wilfrido ELLSWORTH Blood     ___________________                   Date     ________________________________________________________  Provider Signature - SELMA Bynum CNP       ___________________                   Date      ________________________________________________________  Witness Signature (required if provider not present while patient signing)          ___________________                   Date

## 2024-06-13 ENCOUNTER — LAB (OUTPATIENT)
Dept: LAB | Facility: CLINIC | Age: 24
End: 2024-06-13
Payer: COMMERCIAL

## 2024-06-13 DIAGNOSIS — Z82.49 FAMILY HISTORY OF HYPERTENSION: ICD-10-CM

## 2024-06-13 LAB
CHOLEST SERPL-MCNC: 158 MG/DL
FASTING STATUS PATIENT QL REPORTED: YES
FASTING STATUS PATIENT QL REPORTED: YES
GLUCOSE SERPL-MCNC: 101 MG/DL (ref 70–99)
HDLC SERPL-MCNC: 69 MG/DL
LDLC SERPL CALC-MCNC: 83 MG/DL
NONHDLC SERPL-MCNC: 89 MG/DL
TRIGL SERPL-MCNC: 29 MG/DL

## 2024-06-13 PROCEDURE — 82947 ASSAY GLUCOSE BLOOD QUANT: CPT

## 2024-06-13 PROCEDURE — 80061 LIPID PANEL: CPT

## 2024-06-13 PROCEDURE — 36415 COLL VENOUS BLD VENIPUNCTURE: CPT

## 2024-06-14 PROBLEM — R73.01 ELEVATED FASTING GLUCOSE: Status: ACTIVE | Noted: 2024-06-14

## 2024-06-19 ENCOUNTER — MYC REFILL (OUTPATIENT)
Dept: FAMILY MEDICINE | Facility: CLINIC | Age: 24
End: 2024-06-19
Payer: COMMERCIAL

## 2024-06-19 DIAGNOSIS — F90.9 ATTENTION DEFICIT HYPERACTIVITY DISORDER (ADHD), UNSPECIFIED ADHD TYPE: ICD-10-CM

## 2024-06-19 RX ORDER — DEXTROAMPHETAMINE SACCHARATE, AMPHETAMINE ASPARTATE, DEXTROAMPHETAMINE SULFATE AND AMPHETAMINE SULFATE 2.5; 2.5; 2.5; 2.5 MG/1; MG/1; MG/1; MG/1
10 TABLET ORAL DAILY
Qty: 30 TABLET | Refills: 0 | Status: SHIPPED | OUTPATIENT
Start: 2024-06-19 | End: 2024-07-22

## 2024-06-19 RX ORDER — DEXTROAMPHETAMINE SACCHARATE, AMPHETAMINE ASPARTATE, DEXTROAMPHETAMINE SULFATE AND AMPHETAMINE SULFATE 5; 5; 5; 5 MG/1; MG/1; MG/1; MG/1
20 TABLET ORAL EVERY MORNING
Qty: 30 TABLET | Refills: 0 | Status: SHIPPED | OUTPATIENT
Start: 2024-06-19 | End: 2024-07-22

## 2024-07-22 ENCOUNTER — MYC REFILL (OUTPATIENT)
Dept: FAMILY MEDICINE | Facility: CLINIC | Age: 24
End: 2024-07-22
Payer: COMMERCIAL

## 2024-07-22 DIAGNOSIS — F90.9 ATTENTION DEFICIT HYPERACTIVITY DISORDER (ADHD), UNSPECIFIED ADHD TYPE: ICD-10-CM

## 2024-07-22 RX ORDER — DEXTROAMPHETAMINE SACCHARATE, AMPHETAMINE ASPARTATE, DEXTROAMPHETAMINE SULFATE AND AMPHETAMINE SULFATE 2.5; 2.5; 2.5; 2.5 MG/1; MG/1; MG/1; MG/1
10 TABLET ORAL DAILY
Qty: 30 TABLET | Refills: 0 | Status: SHIPPED | OUTPATIENT
Start: 2024-07-22 | End: 2024-08-22

## 2024-07-22 RX ORDER — DEXTROAMPHETAMINE SACCHARATE, AMPHETAMINE ASPARTATE, DEXTROAMPHETAMINE SULFATE AND AMPHETAMINE SULFATE 5; 5; 5; 5 MG/1; MG/1; MG/1; MG/1
20 TABLET ORAL EVERY MORNING
Qty: 30 TABLET | Refills: 0 | Status: SHIPPED | OUTPATIENT
Start: 2024-07-22 | End: 2024-08-22

## 2024-08-20 ASSESSMENT — PATIENT HEALTH QUESTIONNAIRE - PHQ9
SUM OF ALL RESPONSES TO PHQ QUESTIONS 1-9: 2
10. IF YOU CHECKED OFF ANY PROBLEMS, HOW DIFFICULT HAVE THESE PROBLEMS MADE IT FOR YOU TO DO YOUR WORK, TAKE CARE OF THINGS AT HOME, OR GET ALONG WITH OTHER PEOPLE: SOMEWHAT DIFFICULT
10. IF YOU CHECKED OFF ANY PROBLEMS, HOW DIFFICULT HAVE THESE PROBLEMS MADE IT FOR YOU TO DO YOUR WORK, TAKE CARE OF THINGS AT HOME, OR GET ALONG WITH OTHER PEOPLE: SOMEWHAT DIFFICULT
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
## 2024-08-21 ENCOUNTER — VIRTUAL VISIT (OUTPATIENT)
Dept: PSYCHIATRY | Facility: CLINIC | Age: 24
End: 2024-08-21
Attending: NURSE PRACTITIONER
Payer: COMMERCIAL

## 2024-08-21 ENCOUNTER — VIRTUAL VISIT (OUTPATIENT)
Dept: BEHAVIORAL HEALTH | Facility: CLINIC | Age: 24
End: 2024-08-21
Payer: COMMERCIAL

## 2024-08-21 VITALS — WEIGHT: 193 LBS | BODY MASS INDEX: 26.18 KG/M2

## 2024-08-21 DIAGNOSIS — F90.9 ATTENTION DEFICIT HYPERACTIVITY DISORDER (ADHD), UNSPECIFIED ADHD TYPE: ICD-10-CM

## 2024-08-21 DIAGNOSIS — F41.1 GAD (GENERALIZED ANXIETY DISORDER): ICD-10-CM

## 2024-08-21 DIAGNOSIS — F90.2 ATTENTION DEFICIT HYPERACTIVITY DISORDER (ADHD), COMBINED TYPE: Primary | ICD-10-CM

## 2024-08-21 DIAGNOSIS — F33.1 MDD (MAJOR DEPRESSIVE DISORDER), RECURRENT EPISODE, MODERATE (H): ICD-10-CM

## 2024-08-21 DIAGNOSIS — Z79.899 ENCOUNTER FOR LONG-TERM (CURRENT) USE OF MEDICATIONS: Primary | ICD-10-CM

## 2024-08-21 PROCEDURE — 99417 PROLNG OP E/M EACH 15 MIN: CPT | Mod: 95

## 2024-08-21 PROCEDURE — 90791 PSYCH DIAGNOSTIC EVALUATION: CPT | Mod: 95 | Performed by: SOCIAL WORKER

## 2024-08-21 PROCEDURE — G2211 COMPLEX E/M VISIT ADD ON: HCPCS | Mod: 95

## 2024-08-21 PROCEDURE — 99205 OFFICE O/P NEW HI 60 MIN: CPT | Mod: 95

## 2024-08-21 ASSESSMENT — COLUMBIA-SUICIDE SEVERITY RATING SCALE - C-SSRS
4. HAVE YOU HAD THESE THOUGHTS AND HAD SOME INTENTION OF ACTING ON THEM?: NO
REASONS FOR IDEATION LIFETIME: COMPLETELY TO END OR STOP THE PAIN (YOU COULDN'T GO ON LIVING WITH THE PAIN OR HOW YOU WERE FEELING)
6. HAVE YOU EVER DONE ANYTHING, STARTED TO DO ANYTHING, OR PREPARED TO DO ANYTHING TO END YOUR LIFE?: NO
TOTAL  NUMBER OF INTERRUPTED ATTEMPTS LIFETIME: NO
3. HAVE YOU BEEN THINKING ABOUT HOW YOU MIGHT KILL YOURSELF?: NO
2. HAVE YOU ACTUALLY HAD ANY THOUGHTS OF KILLING YOURSELF?: NO
2. HAVE YOU ACTUALLY HAD ANY THOUGHTS OF KILLING YOURSELF?: YES
ATTEMPT LIFETIME: NO
REASONS FOR IDEATION PAST MONTH: DOES NOT APPLY
5. HAVE YOU STARTED TO WORK OUT OR WORKED OUT THE DETAILS OF HOW TO KILL YOURSELF? DO YOU INTEND TO CARRY OUT THIS PLAN?: NO
TOTAL  NUMBER OF ABORTED OR SELF INTERRUPTED ATTEMPTS LIFETIME: NO
1. IN THE PAST MONTH, HAVE YOU WISHED YOU WERE DEAD OR WISHED YOU COULD GO TO SLEEP AND NOT WAKE UP?: NO
1. HAVE YOU WISHED YOU WERE DEAD OR WISHED YOU COULD GO TO SLEEP AND NOT WAKE UP?: YES

## 2024-08-21 ASSESSMENT — PAIN SCALES - GENERAL: PAINLEVEL: MODERATE PAIN (4)

## 2024-08-21 NOTE — Clinical Note
Alexandru Zurita-  I saw Wilfrido today as part of [facility]. He reported some instances of orthostasis and tachycardia at rest. Given he is on Adderall, I've ordered an EKG. Is this something he can schedule to have completed through your office?  I am considering the addition of clonidine and likely switching his stimulant regimen to ritalin or a methylphenidate product which may be better tolerated than Adderall. Before I make any changes I would like for him to have an EKG.  Thanks! Saira

## 2024-08-21 NOTE — PROGRESS NOTES
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 or seeing things that aren t there.  You are feeling psychotic (paranoid, fearful, restless, agitated, nervous, racing thoughts or speech)    Crisis Resources   The EmPath is an adults only unit located at [facility] in [place] is a short term (generally less than 23 hour stay) designed for crisis intervention and stabilization. Pts have the opportunity to meet quickly with a behavioral health team for evaluation in a calm and peaceful therapuetic environment. To be evaluated for admission pts are triaged throught the [facility] ED.     The Behavioral Evaluation Center (BEC) is located at the [facility].The BEC is open to ages and provides a comprehensive behavioral health evaluation to those in crisis. Patients typically stay 24-36 hours.     The following hotlines are for both adults and children. The and are open 24 hours a day, 7 days a week unless noted otherwise.      Crisis Lines        Gambling Hotline  9.179.535.0582 [hope]        línea de crisis española  951.544.7167        [facility] Security Helpline  [phone]        National Hope Line  1.602.314.3696 [hope]        National  Suicide Prevention Lifeline: text 988        The Brayan Project (LGBTQ Youth Crisis Line)  7.297.218.6937  text START to 470-591        's Crisis Line  1.507.669.5206 (Press 1)  or text 105588        [facility] Crisis  847.967.1440      [facility] Mobile Crisis  [phone]      [facility] Crisis  [phone]      [facility] Mobile Crisis  [phone] (adults)  [phone] (children)      [facility] Mobile Crisis  [phone] (adults)  [phone] (children)      [facility] Mobile Crisis  [phone]      [facility] Mobile Crisis  [phone]    Community Resources      Fast Tracker  Linking people to mental health and substance use disorder resources  fasttrackermn.org        National [place] on Mental Illness (CHUY)  005.736.5188 or 1.888.CHUY.HELPS  https://namimn.org/        [facility] Urgent Care for Adult Mental Health  [facility] residents [address]  [phone]        Walk-in Counseling Center  Free mental health counseling  https://walkin.org/  612.870.0565 X2    Mental Health Apps      Calm Harm  https://calmharm.co.uk/      My3  https://my3app.org/      [place] Safety Plan  https://www.mysafetyplan.org/

## 2024-08-21 NOTE — PATIENT INSTRUCTIONS
Medication Plan  -Defer changes pending completion of updated EKG.     **For crisis resources, please see the information at the end of this document**   Patient Education    Thank you for coming to the [facility] MENTAL HEALTH & ADDICTION [place] [facility].     Lab Testing:  If you had lab testing today and your results are reassuring or normal they will be mailed to you or sent through [employer] within 7 days. If the lab tests need quick action we will call you with the results. The phone number we will call with results is # [phone]. If this is not the best number please call our clinic and change the number.     Medication Refills:  If you need any refills please call your pharmacy and they will contact us.   Three business days of notice are needed for general medication refill requests.   Five business days of notice are needed for controlled substance refill requests.   If you need to change to a different pharmacy, please contact the new pharmacy directly. The new pharmacy will help you get your medications transferred.     Contact Us:  Please call [phone] during business hours (8-5:00 M-F).     Financial Assistance [phone]   Medical Records [phone]       MENTAL HEALTH CRISIS RESOURCES:  For a emergency help, please call 911 or go to the nearest Emergency Department.     Emergency Walk-In Options:   EmPATH Unit @ [facility] ([place]): [phone] - Specialized mental health emergency area designed to be calming  [facility] ([place]): [phone]  [facility] ([place]): [phone]  [facility]): [phone]    County Crisis Information:   [place]: [phone]  [place]: [phone]  [place] (CLAUD[place]) - Adult: [phone]     Child: [phone]  [place] - Adult: [phone]     Child: [phone]  [place]: [phone]  List of all [facility] resources:    https://[place].gov/dhs/people-we-serve/adults/health-care/mental-health/resources/crisis-contacts.jsp    National Crisis Information:   Crisis Text Line: Text  [place]  to 823728  Suicide & Crisis Lifeline: 988  National Suicide Prevention Lifeline: 6-721-481-TALK (1-452.569.5212)       For online chat options, visit https://suicidepreventionlifeline.org/chat/  Poison Control Center: 0-726-288-0215  Trans Lifeline: 1-304-224-9656 - Hotline for transgender people of all ages  The Brayan Project: 6-474-002-4149 - Hotline for LGBT youth     For Non-Emergency Support:   Fast Tracker: Mental Health & Substance Use Disorder Resources -   https://www."[employer]n.org/

## 2024-08-21 NOTE — PROGRESS NOTES
Virtual Visit Details    Type of service:  Video Visit     Originating Location (pt. Location): Home    Distant Location (provider location):  Off-site  Platform used for Video Visit: Jossie

## 2024-08-21 NOTE — NURSING NOTE
Is the patient currently in the state of [place]? YES    Current patient location: [facility] [place] [address]    Visit mode:VIDEO    If the visit is dropped, the patient can be reconnected by: VIDEO VISIT:  Send e-mail to at [email or web address]    Will anyone else be joining the visit? No  (If patient encounters technical issues they should call [phone])    How would you like to obtain your AVS? MyChart    Are changes needed to the allergy or medication list? No    Are refills needed on medications prescribed by this physician? NO    Rooming Documentation: Questionnaire(s) completed.    Reason for visit: Consult     Dara Butterfield F      Care team has reviewed attendance agreement with patient. Patient advised that two failed appointments within 6 months may lead to termination of current episode of care.

## 2024-08-22 ENCOUNTER — MYC REFILL (OUTPATIENT)
Dept: FAMILY MEDICINE | Facility: CLINIC | Age: 24
End: 2024-08-22
Payer: COMMERCIAL

## 2024-08-22 DIAGNOSIS — F90.9 ATTENTION DEFICIT HYPERACTIVITY DISORDER (ADHD), UNSPECIFIED ADHD TYPE: ICD-10-CM

## 2024-08-22 RX ORDER — DEXTROAMPHETAMINE SACCHARATE, AMPHETAMINE ASPARTATE, DEXTROAMPHETAMINE SULFATE AND AMPHETAMINE SULFATE 2.5; 2.5; 2.5; 2.5 MG/1; MG/1; MG/1; MG/1
10 TABLET ORAL DAILY
Qty: 30 TABLET | Refills: 0 | Status: SHIPPED | OUTPATIENT
Start: 2024-08-22 | End: 2024-10-05

## 2024-08-22 RX ORDER — DEXTROAMPHETAMINE SACCHARATE, AMPHETAMINE ASPARTATE, DEXTROAMPHETAMINE SULFATE AND AMPHETAMINE SULFATE 5; 5; 5; 5 MG/1; MG/1; MG/1; MG/1
20 TABLET ORAL EVERY MORNING
Qty: 30 TABLET | Refills: 0 | Status: SHIPPED | OUTPATIENT
Start: 2024-08-22 | End: 2024-10-05

## 2024-08-22 NOTE — TELEPHONE ENCOUNTER
Pending Prescriptions:                       Disp   Refills    amphetamine-dextroamphetamine (ADDERALL) *30 tab*0            Sig: Take 1 tablet (20 mg) by mouth every morning.    amphetamine-dextroamphetamine (ADDERALL) *30 tab*0            Sig: Take 1 tablet (10 mg) by mouth daily. In the           afternoon    Routing refill request to provider for review/approval because:  Drug not on the [facility] refill protocol       Lloyd Zaragoza RN

## 2024-09-23 NOTE — PROGRESS NOTES
[facility]  2024      Behavioral Health Clinician Progress Note    Patient Name: Wilfrido Echavarria           Service Type:  Individual      Service Location:   [facility] / Email (patient reached)     Session Start Time: 230 pm  Session End Time: 248 pm      Session Length: 16 - 37      Attendees: Patient     Service Modality:  Video Visit:      Provider verified identity through the following two step process.  Patient provided:  Patient  and Patient is known previously to provider    Telemedicine Visit: The patient's condition can be safely assessed and treated via synchronous audio and visual telemedicine encounter.      Reason for Telemedicine Visit: Patient convenience (e.g. access to timely appointments / distance to available provider)    Originating Site (Patient Location): Patient's home    Distant Site (Provider Location): Provider Remote Setting- Home Office    Consent:  The patient/guardian has verbally consented to: the potential risks and benefits of telemedicine (video visit) versus in person care; bill my insurance or make self-payment for services provided; and responsibility for payment of non-covered services.     Patient would like the video invitation sent by:  My Chart    Mode of Communication:  Video Conference via [facility]    Distant Location (Provider):  Off-site    As the provider I attest to compliance with applicable laws and regulations related to telemedicine.    Visit Activities (Refresh list every visit): [place] [facility] Only    Diagnostic Assessment Date: 2024  Treatment Plan Review Date: 2024  See Flowsheets for today's PHQ-9 and GURMEET-7 results  Previous PHQ-9:       2023     5:11 AM 3/5/2024     6:54 AM 2024     8:07 PM   PHQ-9 SCORE   PHQ-9 Total Score [facility] 0 3 (Minimal depression) 2 (Minimal depression)   PHQ-9 Total Score 0 3 2    2     Previous GURMEET-7:       2018     1:28 PM 2019    11:43 AM  8/30/2019     3:47 PM   GURMEET-7 SCORE   Total Score   2 (minimal anxiety)   Total Score 6 7 2       ASHLY LEVEL:       No data to display                DATA  Extended Session (60+ minutes): No  Interactive Complexity: No  Crisis: No  [facility] Patient: No    Treatment Objective(s) Addressed in This Session:  Target Behavior(s):  be able to focus on mateial at work and school    Attention Problems: will develop coping skills to effectively manage attention issues    Current Stressors / Issues:  MH update: He reports that he's been working a lot. His attention hadn't noticeably improved so he went off the adderall 3 days ago to see if there was a difference but there wasn't. Unable to complete tasks, difficulties with focus, restlessness/fidgety (legs especially).  Adderall did help him focus on tedious tasks. [facility] explored what strategies he's used to manage sx. Taking breaks, timers, lists of tasks. He's been binge eating and has a hx of binge eating which he is concerned about. Adderall has helped to stop the urges to binge. [facility] explored other activities he gets comfort or satisfaction from. He identified playing video games games but to excess (miss work, stay up for 24+ hours).   Stresses: effects of attention on his life  Appetite: unremarkable  Sleep: unremarkable  Therapy: EAP  LES: No  Preg: NA  Interventions: goal development  Most important: medication update, experiencing increased appetite, can't feel full       Progress on Treatment Objective(s) / Homework:  No improvement - ACTION (Actively working towards change); Intervened by reinforcing change plan / affirming steps taken    Motivational Interviewing    MI Intervention: Co-Developed Goal: improved attention, Expressed Empathy/Understanding, Open-ended questions, Reflections: simple and complex, Change talk (evoked), and Reframe     Change Talk Expressed by the Patient: Desire to change Ability to change Reasons to change Need to change Committment to change  Activation Taking steps    Provider Response to Change Talk: E - Evoked more info from patient about behavior change, A - Affirmed patient's thoughts, decisions, or attempts at behavior change, R - Reflected patient's change talk, and S - Summarized patient's change talk statements    Also provided psychoeducation about behavioral health condition, symptoms, and treatment options    Assessments completed prior to visit:  The following assessments were completed by patient for this visit:  PROMIS 10-Global Health (all questions and answers displayed):       8/20/2024     8:23 PM   PROMIS 10   In general, would you say your health is: Excellent   In general, would you say your quality of life is: Very good   In general, how would you rate your physical health? Excellent   In general, how would you rate your mental health, including your mood and your ability to think? Good   In general, how would you rate your satisfaction with your social activities and relationships? Fair   In general, please rate how well you carry out your usual social activities and roles Good   To what extent are you able to carry out your everyday physical activities such as walking, climbing stairs, carrying groceries, or moving a chair? Completely   In the past 7 days, how often have you been bothered by emotional problems such as feeling anxious, depressed, or irritable? Sometimes   In the past 7 days, how would you rate your fatigue on average? Moderate   In the past 7 days, how would you rate your pain on average, where 0 means no pain, and 10 means worst imaginable pain? 5   In general, would you say your health is: 5   In general, would you say your quality of life is: 4   In general, how would you rate your physical health? 5   In general, how would you rate your mental health, including your mood and your ability to think? 3   In general, how would you rate your satisfaction with your social activities and relationships? 2   In  general, please rate how well you carry out your usual social activities and roles. (This includes activities at home, at work and in your community, and responsibilities as a parent, child, spouse, employee, friend, etc.) 3   To what extent are you able to carry out your everyday physical activities such as walking, climbing stairs, carrying groceries, or moving a chair? 5   In the past 7 days, how often have you been bothered by emotional problems such as feeling anxious, depressed, or irritable? 3   In the past 7 days, how would you rate your fatigue on average? 3   In the past 7 days, how would you rate your pain on average, where 0 means no pain, and 10 means worst imaginable pain? 5   Global Mental Health Score 12    12   Global Physical Health Score 16    16   PROMIS TOTAL - SUBSCORES 28    28     PROMIS 10-Global Health (only subscores and total score):       8/20/2024     8:23 PM   PROMIS-10 Scores Only   Global Mental Health Score 12    12   Global Physical Health Score 16    16   PROMIS TOTAL - SUBSCORES 28    28       Care Plan review completed: Yes    Medication Review:  No changes to current psychiatric medication(s)    Medication Compliance:  Yes    Changes in Health Issues:   None reported    Chemical Use Review:   Substance Use: Chemical use reviewed, no active concerns identified      Tobacco Use: No current tobacco use.      Assessment: Current Emotional / Mental Status (status of significant symptoms):  Risk status (Self / Other harm or suicidal ideation)  Patient denies a history of suicidal ideation, suicide attempts, self-injurious behavior, homicidal ideation, homicidal behavior, and and other safety concerns  Patient denies current fears or concerns for personal safety.  Patient denies current or recent suicidal ideation or behaviors.  Patient denies current or recent homicidal ideation or behaviors.  Patient denies current or recent self injurious behavior or ideation.  Patient denies other  safety concerns.  A safety and risk management plan has not been developed at this time, however patient was encouraged to call [place] [phone] should there be a change in any of these risk factors.    Appearance:   Appropriate   Eye Contact:   Good   Psychomotor Behavior: Normal   Attitude:   Cooperative   Orientation:   All  Speech   Rate / Production: Normal    Volume:  Normal   Mood:    Normal  Affect:    Appropriate   Thought Content:  Clear   Thought Form:  Coherent  Logical   Insight:    Good     Diagnoses:  No diagnosis found.    Collateral Reports Completed:  Collaborated with CCPS team     Plan: (Homework, other):  Patient was given information about behavioral services and encouraged to schedule a follow up appointment with the clinic [facility] in 1 month.  He was also given information about mental health symptoms and treatment options .  CD Recommendations: No indications of CD issues.       Heavenly Flores, [facility]    ______________________________________________________________________    Integrated Primary Care Behavioral Health Treatment Plan    Individual Treatment Plan    Patient's Name: Wilfrido Echavarria   YOB: 2000  Date of Creation: 09/24/2024  Date Treatment Plan Last Reviewed/Revised: pending    DSM5 Diagnoses: Attention-Deficit/Hyperactivity Disorder  314.01 (F90.2) Combined presentation  Psychosocial / Contextual Factors: none noted  PROMIS (reviewed every 90 days):   The following assessments were completed by patient for this visit:  PROMIS 10-Global Health (all questions and answers displayed):       8/20/2024     8:23 PM   PROMIS 10   In general, would you say your health is: Excellent   In general, would you say your quality of life is: Very good   In general, how would you rate your physical health? Excellent   In general, how would you rate your mental health, including your mood and your ability to think? Good   In general, how would you rate your satisfaction with your  social activities and relationships? Fair   In general, please rate how well you carry out your usual social activities and roles Good   To what extent are you able to carry out your everyday physical activities such as walking, climbing stairs, carrying groceries, or moving a chair? Completely   In the past 7 days, how often have you been bothered by emotional problems such as feeling anxious, depressed, or irritable? Sometimes   In the past 7 days, how would you rate your fatigue on average? Moderate   In the past 7 days, how would you rate your pain on average, where 0 means no pain, and 10 means worst imaginable pain? 5   In general, would you say your health is: 5   In general, would you say your quality of life is: 4   In general, how would you rate your physical health? 5   In general, how would you rate your mental health, including your mood and your ability to think? 3   In general, how would you rate your satisfaction with your social activities and relationships? 2   In general, please rate how well you carry out your usual social activities and roles. (This includes activities at home, at work and in your community, and responsibilities as a parent, child, spouse, employee, friend, etc.) 3   To what extent are you able to carry out your everyday physical activities such as walking, climbing stairs, carrying groceries, or moving a chair? 5   In the past 7 days, how often have you been bothered by emotional problems such as feeling anxious, depressed, or irritable? 3   In the past 7 days, how would you rate your fatigue on average? 3   In the past 7 days, how would you rate your pain on average, where 0 means no pain, and 10 means worst imaginable pain? 5   Global Mental Health Score 12    12   Global Physical Health Score 16    16   PROMIS TOTAL - SUBSCORES 28    28     PROMIS 10-Global Health (only subscores and total score):       8/20/2024     8:23 PM   PROMIS-10 Scores Only   Global Mental Health  "Score 12    12   Global Physical Health Score 16    16   PROMIS TOTAL - SUBSCORES 28    28        Referral / Collaboration:  Collaborated with CCPS team .    Anticipated number of session for this episode of care: 6-9 sessions  Anticipation frequency of session: Monthly  Anticipated Duration of each session: 16-37 minutes  Treatment plan will be reviewed in 90 days or when goals have been changed.       MeasurableTreatment Goal(s) related to diagnosis / functional impairment(s)  Goal 1: Patient will experience improved attention   \"I will know I've met my goal when if I'm able to focus on on thing at a time.\"     Objective #A (Patient Action)    Patient will identify 4 study skills  Status: New - Date: 09/24/2024      Intervention(s)    [facility] provided psycho-education on skills to manage ADHD including:  Goal Setting  Externalizing Cues (notes, calendars,alarms, white board)  Use of Planner  Environmental Modification (wear headphones to manage sensory overload or having an escape plan to remove self from overwhelming situations, use of headphones to assist with studying e.g)  Breaking Task to Smaller Parts   Patient has reviewed and agreed to the above plan.    Written by  Heavenly Flores, [facility], [facility]    "

## 2024-09-24 ENCOUNTER — VIRTUAL VISIT (OUTPATIENT)
Dept: PSYCHIATRY | Facility: CLINIC | Age: 24
End: 2024-09-24
Payer: COMMERCIAL

## 2024-09-24 ENCOUNTER — VIRTUAL VISIT (OUTPATIENT)
Dept: BEHAVIORAL HEALTH | Facility: CLINIC | Age: 24
End: 2024-09-24
Payer: COMMERCIAL

## 2024-09-24 DIAGNOSIS — F41.1 GAD (GENERALIZED ANXIETY DISORDER): ICD-10-CM

## 2024-09-24 DIAGNOSIS — F90.2 ATTENTION DEFICIT HYPERACTIVITY DISORDER (ADHD), COMBINED TYPE: Primary | ICD-10-CM

## 2024-09-24 DIAGNOSIS — F33.1 MDD (MAJOR DEPRESSIVE DISORDER), RECURRENT EPISODE, MODERATE (H): ICD-10-CM

## 2024-09-24 DIAGNOSIS — F90.9 ATTENTION DEFICIT HYPERACTIVITY DISORDER (ADHD), UNSPECIFIED ADHD TYPE: Primary | ICD-10-CM

## 2024-09-24 PROCEDURE — 99214 OFFICE O/P EST MOD 30 MIN: CPT | Mod: 95

## 2024-09-24 PROCEDURE — G2211 COMPLEX E/M VISIT ADD ON: HCPCS | Mod: 95

## 2024-09-24 PROCEDURE — 90832 PSYTX W PT 30 MINUTES: CPT | Mod: 95 | Performed by: SOCIAL WORKER

## 2024-09-24 RX ORDER — GUANFACINE 1 MG/1
1 TABLET, EXTENDED RELEASE ORAL AT BEDTIME
Qty: 30 TABLET | Refills: 0 | Status: SHIPPED | OUTPATIENT
Start: 2024-09-24

## 2024-09-24 ASSESSMENT — ANXIETY QUESTIONNAIRES
7. FEELING AFRAID AS IF SOMETHING AWFUL MIGHT HAPPEN: NOT AT ALL
GAD7 TOTAL SCORE: 6
8. IF YOU CHECKED OFF ANY PROBLEMS, HOW DIFFICULT HAVE THESE MADE IT FOR YOU TO DO YOUR WORK, TAKE CARE OF THINGS AT HOME, OR GET ALONG WITH OTHER PEOPLE?: SOMEWHAT DIFFICULT
GAD7 TOTAL SCORE: 6
GAD7 TOTAL SCORE: 6

## 2024-09-24 ASSESSMENT — PAIN SCALES - GENERAL: PAINLEVEL: NO PAIN (0)

## 2024-09-24 ASSESSMENT — PATIENT HEALTH QUESTIONNAIRE - PHQ9
SUM OF ALL RESPONSES TO PHQ QUESTIONS 1-9: 12
SUM OF ALL RESPONSES TO PHQ QUESTIONS 1-9: 12
10. IF YOU CHECKED OFF ANY PROBLEMS, HOW DIFFICULT HAVE THESE PROBLEMS MADE IT FOR YOU TO DO YOUR WORK, TAKE CARE OF THINGS AT HOME, OR GET ALONG WITH OTHER PEOPLE: SOMEWHAT DIFFICULT

## 2024-09-24 NOTE — PATIENT INSTRUCTIONS
Medication Plan  -Start guanfacine ER (Intuniv) 1mg at bedtime for ADHD/anxiety     **For crisis resources, please see the information at the end of this document**   Patient Education    Thank you for coming to the [facility] MENTAL HEALTH & ADDICTION [place] [facility].     Lab Testing:  If you had lab testing today and your results are reassuring or normal they will be mailed to you or sent through [employer] within 7 days. If the lab tests need quick action we will call you with the results. The phone number we will call with results is # [phone]. If this is not the best number please call our clinic and change the number.     Medication Refills:  If you need any refills please call your pharmacy and they will contact us.  Three business days of notice are needed for general medication refill requests.   Five business days of notice are needed for controlled substance refill requests.   If you need to change to a different pharmacy, please contact the new pharmacy directly. The new pharmacy will help you get your medications transferred.     Contact Us:  Please call [phone] during business hours (8-5:00 M-F).     Financial Assistance [phone]   Medical Records [phone]       MENTAL HEALTH CRISIS RESOURCES:  For a emergency help, please call 911 or go to the nearest Emergency Department.     Emergency Walk-In Options:   EmPATH Unit @ [facility] ([place]): [phone] - Specialized mental health emergency area designed to be calming  [facility] ([place]): [phone]  [facility] ([place]): [phone]  [facility]): [phone]    County Crisis Information:   [place]: [phone]  [place]: [phone]  [place] ([place]UD[place]) - Adult: [phone]     Child: [phone]  [place] - Adult: [phone]     Child: [phone]  [place]: [phone]  List of all [facility] resources:    https://[place].gov/dhs/people-we-serve/adults/health-care/mental-health/resources/crisis-contacts.jsp    National Crisis Information:   Crisis Text Line: Text  [place]  to 610275  Suicide & Crisis Lifeline: 988  National Suicide Prevention Lifeline: 9-110-429-TALK (1-588.632.1910)       For online chat options, visit https://suicidepreventionlifeline.org/chat/  Poison Control Center: 0-251-140-0933  Trans Lifeline: 0-101-732-0398 - Hotline for transgender people of all ages  The Brayan Project: 2-284-163-8506 - Hotline for LGBT youth     For Non-Emergency Support:   Fast Tracker: Mental Health & Substance Use Disorder Resources -   https://www.[employer].org/

## 2024-09-24 NOTE — NURSING NOTE
Current patient location:  work    Is the patient currently in the state [facility]? YES    Visit mode:VIDEO    If the visit is dropped, the patient can be reconnected by: VIDEO VISIT: Text to cell phone:   Telephone Information:   Mobile [phone]       Will anyone else be joining the visit? NO  (If patient encounters technical issues they should call [phone]    How would you like to obtain your AVS? MyChart    Are changes needed to the allergy or medication list? Pt stated no changes to allergies and Pt stated no med changes    Patient stated they use smokeless tobacco.    Are refills needed on medications prescribed by this physician? Discuss with provider    Rooming Documentation:  Questionnaire(s) completed    Reason for visit: JAISON MILLER

## 2024-09-24 NOTE — PROGRESS NOTES
[facility]  Collaborative Care Psychiatry Service ([facility])  MEDICAL PROGRESS NOTE     Wilfrido Echavarria is a 24 year old adult who prefers the name Wilfrido.     Initial consultation on 08/21/24.      CARE TEAM:   PCP- Yudith Retana  Therapist- None              Assessment & Plan     History and interview support the following diagnoses:   MDD  GURMEET  ADHD    Wilfrido is a 24-year-old adult with history of depression, anxiety, and ADHD who presents for psychiatric [facility] with [facility].     Patient last seen 08/21/24. No medication changes were made. I did recommend patient complete an EKG due to reports of tachycardia in the setting of stimulant use.     Today, Wilfrido reports ongoing anxiety and ADHD symptoms. He recently stopped taking Adderall for several days and didn't notice much worsening of concentration/focus but did notice an increase in appetite. He is interested in minimizing stimulant use.     We will start guanfacine ER 1mg at bedtime for anxiety and ADHD symptoms. We chose this option over clonidine as it is carries lessened risk for orthostasis and fatigue. Wilfrido is aware of the risk of orthostasis with guanfacine and will closely monitor for this. We discussed ways to reduce the risk of dizziness including changing positions slowly and maintaining adequate hydration/nutrition.     Wilfrido has not yet completed the EKG as discussed at our last visit. I would like this to be completed due to complaints of tachycardia in the setting of stimulant use. Orthostasis tends to occur infrequently and usually in the setting of decreased food/fluid intake.     Assessment from initial consultation on 08/21/24:  Patient reports several different concerns today. He reports history of increased anger and difficulty controlling his emotions. He has no history of physical aggression or violence towards others however at times he is verbally  reactive and says things he later on regrets. This has impacted his relationships and his quality of life. He doesn't feel that depression is a current concern. Anxiety, impulsivity, and feeling tense and always on edge tend to be the symptoms that are most disruptive to his life. He does carry a diagnosis of ADHD and has only ever been on amphetamine based stimulants. He does feel that Adderall helps him fidget less but continues to have a hard time with focus and staying on track, especially when reading or attempting to study.    Patient's history is significant for notable trauma. He endorses regular trauma-related nightmares that can disrupt sleep at times. In general he feels that he is getting enough sleep since implemented a consistent sleep schedule. He carries genetic loading for bipolar disorder however based on history and assessment, he does not present with symptoms consistent with hypo/ade.     Patient reports symptoms of periodic tachycardia at rest based on alerts he receives from his watch. He also notes periodic orthostasis. Given these symptoms, I've asked him to complete an updated EKG through his PCP before we change his medication regimen. If symptoms of tachycardia persist or worsen, he was instructed to hold his Adderall dose and seek urgent medical care.     Future considerations:  -We discussed use of clonidine for behavioral dysregulation related to autonomic overactivation which may have some benefit for nightmares and concentration. Prone to orthostasis, must be titrated slowly. Once he repeats an EKG we will first focus on starting this medication as it will help target anxiety/overactivation/trauma nightmares/concentration. He also reports a history of tics, worsened in the setting of anxiety/started before stimulant use, which clonidine may also help treat.   -Switching from Adderall to methylphenidate which may somewhat reduce the risk of HTN and tachycardia. May also contribute  less to agitation/overactivation.  -Consider use of selective serotonin reuptake inhibitor (Lexapro could be a good option) to target anxiety/mood symptoms.     Psychotherapy discussion: Primary recommendation for the treatment of mood symptoms, especially anxiety. Supportive therapy can be useful for reducing the impact of acute or chronic psychosocial stressors. CBT can be particularly helpful for ruminative thinking and addressing maladaptive coping mechanisms that may worsen anxiety.     PSYCHOTROPIC DRUG INTERACTIONS: **Italicized interactions are for treatment plan options not currently implemented**  none    MANAGEMENT:  N/A    [place] was [place] today:                   Plan    1) PSYCHOTROPIC MEDICATION RECOMMENDATIONS:  -Start guanfacine ER (Intuniv) 1mg at bedtime for ADHD/anxiety    Prescribed Adderall IR 20mg QAM + Adderall IR 10mg Qafternoon by PCP    2) THERAPY: None    3) NEXT DUE:   Labs- TSH and CMP ordered 08/21/24  ECG- EKG ordered 08/21/24    4) REFERRALS / COORDINATION: None    5) DISPOSITION:   - Pt would benefit from brief psychiatric intervention (up to ~5 visits) to adjust meds and gauge for benefit.   - Follow up with SELMA Centeno CNP in 4 weeks. Plan to transition med management back to designated prescriber after brief care is complete.   - If not seen for 6 months, follow up and prescribing will automatically revert back to referring provider / PCP.     Treatment Risk Statement:  The patient understands the risks, benefits, adverse effects and alternatives. Agrees to treatment with the capacity to do so. No medical contraindications to treatment. Agrees to contact provider for any problems. The patient understands to call 911 or go to the nearest ED if urgent or life threatening symptoms occur. Crisis contact numbers are provided routinely in the After Visit Summary.    Suicide Risk Assessment: Wilfrido did not appear to be an imminent safety risk to self or others.    PROVIDER:   "SELMA Centeno CNP    The [facility] psychiatry providers act as a specialty service for Primary Care Providers in the [facility] who seek to optimize medications for unstable patients. Once medications have been optimized, [facility] providers discharge the patient back to the referring Primary Care Provider for ongoing medication management. Care team has reviewed attendance agreement with patient. Patient advised that two failed appointments within 6 months may lead to termination of current episode of care.     Patient Status:  Patient will continue to be seen for ongoing consultation and stabilization.              History of Present Illness     Per [facility] Heavenly Flores during today's team-based visit: \"He reports that he's been working a lot. His attention hadn't noticeably improved so he went off the adderall 3 days ago to see if there was a difference but there wasn't. Unable to complete tasks, difficulties with focus, restlessness/fidgety (legs especially).  Adderall did help him focus on tedious tasks. [facility] explored what strategies he's used to manage sx. Taking breaks, timers, lists of tasks. He's been binge eating and has a hx of binge eating which he is concerned about. Adderall has helped to stop the urges to binge. [facility] explored other activities he gets comfort or satisfaction from. He identified playing video games games but to excess (miss work, stay up for 24+ hours).   Stresses: effects of attention on his life  Appetite: unremarkable  Sleep: unremarkable  Therapy: EAP  LES: No  Preg: NA  Interventions: goal development  Most important: medication update, experiencing increased appetite, can't feel full\"    Patient last seen 08/21/24. No medication changes were made. I did recommend patient complete an EKG due to report of tachycardia in the setting of stimulant use. Since the last visit:  -Mood has been \"up and down.\" Motivation and energy tend to fluctuate.   -Has gotten better at catching " verbal aggression/irritability.   -Averaging about 7 hours of sleep.  -Has not yet completed the EKG that we discussed at our last visit. Continues to experience intermittent tachycardia at rest and orthostasis on standing too quickly when he hasn't eaten enough. No falls.   -Stopped Adderall temporarily over the past few days. Since stopping, noticed an increase in hunger. Has felt out of control with his eating since stopping Adderall. Notes history of binge eating. Adderall has helped reduce the binge eating.   -Has a hard time staying focused while reading. This is a significant area of frustration. This has been going on since childhood - he was in special education classes in middle school.   -Dizziness occurs if he restricts water, food intake. He does a good job at eating small meals throughout the day. No falls.     Current psychotropic medication:  Adderall IR 20mg QAM + 10mg Qafternoon - takes medication daily. Unclear benefit.    Medication ASE: tachycardia - unclear if this was present prior to use of stimulants  Medication adherence: No concerns    Recent Psych Symptoms:   Depression:  intermittent low energy, amotivation  Elevated:  none  Psychosis:  none  Anxiety:  excessive worry and nervous/overwhelmed  Trauma Related:  nightmares  Insomnia:  No  Other:  No    Pertinent Negatives: No suicidal or violent ideation, psychosis, or hypo/ade.      Pertinent Social Hx:  FINANCIAL SUPPORT- Working FT in IT.   LIVING SITUATION / RELATIONSHIPS- Lives with fiancee. Feels safe at home.    SOCIAL/ SPIRITUAL SUPPORT- Mother, fiancee    Pertinent Substance Use  Alcohol- None  Nicotine- None  Caffeine- Takes 200mg of caffeine in the morning  Opioids- None  Narcan Kit- N/A  THC/CBD- None  Other Illicit Drugs- none              Medical Review of Systems   Dizziness/orthostasis- Denies recent falls but does experience periods of orthostasis on standing too quickly.  Respiratory- Denies hx of asthma. No inhaler use  ever.   Cardiovascular- Reports periods of increased heart rate at rest (up to 120s), has had several instances of this over the last few months.   Gastrointestinal- Endorses loose stools x3-4 times per week.   Tics, tremors- Hx of vocal tics/motor tics. Eye blinking daily - increases with anxiety.  Sexual health concerns- None  A comprehensive review of systems was performed and is negative other than noted above.    Denies family hx of sudden cardiac death or MI. Endorses family hx of HTN in siblings.               Psych Summary Points  08/2024: Initial consultation 08/21/24; no medication changes. EKG ordered due to report of tachycardia in the context of stimulant use.   09/2024: Started guanfacine ER 1mg at bedtime for ADHD/anxiety.               Past Psychotropic Medications     Medication Max Dose (mg) Dates / Duration Helpful? DC Reason / Adverse Effects?   Wellbutrin       trazodone   N    sertraline   N Took [place]orota year of high school to freshman year of HS.    Adderall XR    ineffective   Strattera   N Rashes, nausea      No history of methylphenidate products              Past Medical History     Medication allergies:    Allergies   Allergen Reactions    Strattera [Atomoxetine] Other (See Comments)     Causing chest pain with AM dose and mind fogginess in PM dose       Neurologic Hx [head injury, seizures, etc.]: Denies hx of seizure. Endorses suspected hx of concussion but unable to recall whether this was ever formally diagnosed.  Patient Active Problem List   Diagnosis    Attention deficit hyperactivity disorder (ADHD), unspecified ADHD type    Acne    Hypertrophy of breast    Sciatica without back pain, unspecified laterality    Family history of hypertension    Piriformis syndrome, right    Elevated fasting glucose     Past Medical History:   Diagnosis Date    ADHD (attention deficit hyperactivity disorder)     Anxiety     Depression                  Medications   Current Outpatient Medications    Medication Sig Dispense Refill    amphetamine-dextroamphetamine (ADDERALL) 10 MG tablet Take 1 tablet (10 mg) by mouth daily. In the afternoon 30 tablet 0    amphetamine-dextroamphetamine (ADDERALL) 20 MG tablet Take 1 tablet (20 mg) by mouth every morning. 30 tablet 0                 Physical Exam  (Vitals Only)  There were no vitals taken for this visit.    Pulse Readings from Last 5 Encounters:   06/03/24 76   03/05/24 84   09/07/23 63   05/25/23 82   02/10/23 74     Wt Readings from Last 5 Encounters:   08/21/24 87.5 kg (193 lb)   06/03/24 90.3 kg (199 lb)   03/05/24 82.7 kg (182 lb 6.4 oz)   09/07/23 95.1 kg (209 lb 9.6 oz)   05/25/23 95.7 kg (211 lb)     BP Readings from Last 5 Encounters:   06/03/24 120/54   03/05/24 132/84   09/07/23 124/76   05/25/23 128/72   02/10/23 112/68                 Mental Status Exam  Alertness: alert  and oriented  Appearance: adequately groomed  Behavior/Demeanor: cooperative, pleasant, and calm, with good eye contact   Speech: normal and regular rate and rhythm  Language: intact and no problems  Psychomotor: normal or unremarkable  Mood: anxious  Affect: full range and appropriate; was congruent to mood  Thought Process/Associations: unremarkable  Thought Content:  Reports none;  Denies suicidal ideation, violent ideation, and delusions  Perception:  Reports none;  Denies auditory hallucinations and visual hallucinations  Insight: intact  Judgment: intact  Cognition: does  appear grossly intact; formal cognitive testing was not done  oriented: time, person, and place  Gait and Station:  N/A - telehealth                Data       8/20/2024     8:23 PM   PROMIS-10 Total Score w/o Sub Scores   PROMIS TOTAL - SUBSCORES 28    28         8/20/2024     8:23 PM   CAGE-AID Total Score   Total Score 0    0   Total Score MyChart 0 (A total score of 2 or greater is considered clinically significant)         3/5/2024     6:54 AM 8/20/2024     8:07 PM 9/24/2024     1:33 PM   PHQ   PHQ-9  Total Score 3 2    2 12   Q9: Thoughts of better off dead/self-harm past 2 weeks Not at all Not at all Not at all         1/23/2019    11:43 AM 8/30/2019     3:47 PM 9/24/2024     1:34 PM   GURMEET-7 SCORE   Total Score  2 (minimal anxiety) 6 (mild anxiety)   Total Score 7 2 6       Liver/kidney function Metabolic Blood counts   Recent Labs   Lab Test 04/30/18  1553   CR 1.15*   AST 27   ALT 33   ALKPHOS 131    Recent Labs   Lab Test 06/13/24  1020 09/07/23  0715   CHOL 158  --    TRIG 29  --    LDL 83  --    HDL 69  --    A1C  --  5.1   TSH  --  2.00    Recent Labs   Lab Test 12/09/23  1506   WBC 5.4   HGB 15.7   HCT 44.3   MCV 86           No results found for this or any previous visit.    Administrative Billing:     Level of Medical Decision Making:   - At least 1 chronic problem that is not stable  - Engaged in prescription drug management during visit (discussed any medication benefits, side effects, alternatives, etc.)    The longitudinal plan of care for the diagnosis(es)/condition(s) as documented above were addressed during this visit. Due to the added complexity in care, I will continue to support Wilfrido in the subsequent management and with ongoing continuity of care.

## 2024-09-24 NOTE — PROGRESS NOTES
Virtual Visit Details    Type of service:  Video Visit     Originating Location (pt. Location): Work    Distant Location (provider location):  Off-site  Platform used for Video Visit: [email or web address]

## 2024-10-05 ENCOUNTER — MYC REFILL (OUTPATIENT)
Dept: FAMILY MEDICINE | Facility: CLINIC | Age: 24
End: 2024-10-05
Payer: COMMERCIAL

## 2024-10-05 DIAGNOSIS — F90.9 ATTENTION DEFICIT HYPERACTIVITY DISORDER (ADHD), UNSPECIFIED ADHD TYPE: ICD-10-CM

## 2024-10-07 RX ORDER — DEXTROAMPHETAMINE SACCHARATE, AMPHETAMINE ASPARTATE, DEXTROAMPHETAMINE SULFATE AND AMPHETAMINE SULFATE 2.5; 2.5; 2.5; 2.5 MG/1; MG/1; MG/1; MG/1
10 TABLET ORAL DAILY
Qty: 30 TABLET | Refills: 0 | Status: SHIPPED | OUTPATIENT
Start: 2024-10-07 | End: 2024-11-07

## 2024-10-07 RX ORDER — DEXTROAMPHETAMINE SACCHARATE, AMPHETAMINE ASPARTATE, DEXTROAMPHETAMINE SULFATE AND AMPHETAMINE SULFATE 5; 5; 5; 5 MG/1; MG/1; MG/1; MG/1
20 TABLET ORAL EVERY MORNING
Qty: 30 TABLET | Refills: 0 | Status: SHIPPED | OUTPATIENT
Start: 2024-10-07 | End: 2024-11-07

## 2024-10-07 NOTE — TELEPHONE ENCOUNTER
Pending Prescriptions:                       Disp   Refills    amphetamine-dextroamphetamine (ADDERALL) 2*30 tab*0        Sig: Take 1 tablet (20 mg) by mouth every morning.    amphetamine-dextroamphetamine (ADDERALL) 1*30 tab*0        Sig: Take 1 tablet (10 mg) by mouth daily. In the           afternoon    Routing refill request to provider for review/approval because:  Drug not on the [facility] refill protocol     Reyna Flaherty RN  [facility]

## 2024-10-19 ENCOUNTER — LAB (OUTPATIENT)
Dept: LAB | Facility: CLINIC | Age: 24
End: 2024-10-19
Payer: COMMERCIAL

## 2024-10-19 DIAGNOSIS — F90.9 ATTENTION DEFICIT HYPERACTIVITY DISORDER (ADHD), UNSPECIFIED ADHD TYPE: ICD-10-CM

## 2024-10-19 DIAGNOSIS — F33.1 MDD (MAJOR DEPRESSIVE DISORDER), RECURRENT EPISODE, MODERATE (H): ICD-10-CM

## 2024-10-19 PROCEDURE — 36415 COLL VENOUS BLD VENIPUNCTURE: CPT

## 2024-10-19 PROCEDURE — 84443 ASSAY THYROID STIM HORMONE: CPT

## 2024-10-19 PROCEDURE — 80053 COMPREHEN METABOLIC PANEL: CPT

## 2024-10-20 LAB
ALBUMIN SERPL BCG-MCNC: 4.3 G/DL (ref 3.5–5.2)
ALP SERPL-CCNC: 80 U/L (ref 40–150)
ALT SERPL W P-5'-P-CCNC: 46 U/L (ref 0–70)
ANION GAP SERPL CALCULATED.3IONS-SCNC: 12 MMOL/L (ref 7–15)
AST SERPL W P-5'-P-CCNC: 107 U/L (ref 0–45)
BILIRUB SERPL-MCNC: 0.4 MG/DL
BUN SERPL-MCNC: 17.4 MG/DL (ref 6–20)
CALCIUM SERPL-MCNC: 9 MG/DL (ref 8.8–10.4)
CHLORIDE SERPL-SCNC: 103 MMOL/L (ref 98–107)
CREAT SERPL-MCNC: 1.17 MG/DL (ref 0.67–1.17)
EGFRCR SERPLBLD CKD-EPI 2021: 89 ML/MIN/1.73M2
GLUCOSE SERPL-MCNC: 106 MG/DL (ref 70–99)
HCO3 SERPL-SCNC: 24 MMOL/L (ref 22–29)
POTASSIUM SERPL-SCNC: 4 MMOL/L (ref 3.4–5.3)
PROT SERPL-MCNC: 6.9 G/DL (ref 6.4–8.3)
SODIUM SERPL-SCNC: 139 MMOL/L (ref 135–145)
TSH SERPL DL<=0.005 MIU/L-ACNC: 1.28 UIU/ML (ref 0.3–4.2)

## 2024-10-21 ENCOUNTER — HOSPITAL ENCOUNTER (OUTPATIENT)
Dept: CARDIOLOGY | Facility: CLINIC | Age: 24
Discharge: HOME | End: 2024-10-21
Payer: COMMERCIAL

## 2024-10-21 DIAGNOSIS — F90.9 ATTENTION DEFICIT HYPERACTIVITY DISORDER: ICD-10-CM

## 2024-10-21 DIAGNOSIS — Z79.899 ENCOUNTER FOR LONG-TERM (CURRENT) USE OF MEDICATIONS: ICD-10-CM

## 2024-10-21 DIAGNOSIS — F90.9 ATTENTION DEFICIT HYPERACTIVITY DISORDER: Primary | ICD-10-CM

## 2024-10-21 PROCEDURE — 93005 ELECTROCARDIOGRAM TRACING: CPT

## 2024-10-22 LAB
ATRIAL RATE - MUSE: 73 BPM
DIASTOLIC BLOOD PRESSURE - MUSE: NORMAL MMHG
INTERPRETATION ECG - MUSE: NORMAL
P AXIS - MUSE: 61 DEGREES
PR INTERVAL - MUSE: 142 MS
QRS DURATION - MUSE: 110 MS
QT - MUSE: 390 MS
QTC - MUSE: 429 MS
R AXIS - MUSE: 83 DEGREES
SYSTOLIC BLOOD PRESSURE - MUSE: NORMAL MMHG
T AXIS - MUSE: 60 DEGREES
VENTRICULAR RATE- MUSE: 73 BPM

## 2024-11-07 ENCOUNTER — MYC REFILL (OUTPATIENT)
Dept: FAMILY MEDICINE | Facility: CLINIC | Age: 24
End: 2024-11-07
Payer: COMMERCIAL

## 2024-11-07 DIAGNOSIS — F90.9 ATTENTION DEFICIT HYPERACTIVITY DISORDER (ADHD), UNSPECIFIED ADHD TYPE: ICD-10-CM

## 2024-11-07 RX ORDER — DEXTROAMPHETAMINE SACCHARATE, AMPHETAMINE ASPARTATE, DEXTROAMPHETAMINE SULFATE AND AMPHETAMINE SULFATE 2.5; 2.5; 2.5; 2.5 MG/1; MG/1; MG/1; MG/1
10 TABLET ORAL DAILY
Qty: 30 TABLET | Refills: 0 | Status: SHIPPED | OUTPATIENT
Start: 2024-11-07

## 2024-11-07 RX ORDER — DEXTROAMPHETAMINE SACCHARATE, AMPHETAMINE ASPARTATE, DEXTROAMPHETAMINE SULFATE AND AMPHETAMINE SULFATE 5; 5; 5; 5 MG/1; MG/1; MG/1; MG/1
20 TABLET ORAL EVERY MORNING
Qty: 30 TABLET | Refills: 0 | Status: SHIPPED | OUTPATIENT
Start: 2024-11-07

## 2024-11-11 NOTE — PROGRESS NOTES
[facility]    Behavioral Health Clinician Progress Note   Mental Health & Addiction Services      November 12, 2024    Patient Name: Wilfrido Echavarria       Service Type:  Individual   Service Location:  [place] / Email (patient reached)   Visit Start Time: 2:27 PM  Visit End Time:  2:44 PM   Session Length: 16 - 37    Attendees: Patient   Service Modality: Video Visit:      Provider verified identity through the following two step process.  Patient provided:  Patient photo and Patient is known previously to provider    Telemedicine Visit: The patient's condition can be safely assessed and treated via synchronous audio and visual telemedicine encounter.      Reason for Telemedicine Visit: Patient convenience (e.g. access to timely appointments / distance to available provider)    Originating Site (Patient Location): Patient's home    Distant Site (Provider Location): Provider Remote Setting- Home Office    Consent:  The patient/guardian has verbally consented to: the potential risks and benefits of telemedicine (video visit) versus in person care; bill my insurance or make self-payment for services provided; and responsibility for payment of non-covered services.     Patient would like the video invitation sent by:  My Chart    Mode of Communication:  Video Conference via [facility]    Distant Location (Provider):  Off-site    As the provider I attest to compliance with applicable laws and regulations related to telemedicine.   Visit number: 3    [facility] Visit Activities (Refresh list every visit): [facility] Only     Diagnostic Assessment Date: 08/21/2024   Treatment Plan Review Date: 02/12/2025      DATA:    Extended Session (60+ minutes): No   Interactive Complexity: No   Crisis: No   [facility] Patient: No     Assessments completed prior to visit:   PHQ2:       9/24/2024     1:32 PM 3/5/2024     8:15 AM   PHQ-2 ( 2000 [employer])   Q1: Little interest or pleasure in doing things 2  0    Q2: Feeling down, depressed or hopeless 0  0   PHQ-2 Score 2 0   Q1: Little interest or pleasure in doing things More than half the days    Q2: Feeling down, depressed or hopeless Not at all    PHQ-2 Score 2        Patient-reported     PHQ9:       11/6/2019    11:35 AM 10/20/2022    10:40 AM 2/10/2023     9:05 AM 9/7/2023     5:11 AM 3/5/2024     6:54 AM 8/20/2024     8:07 PM 9/24/2024     1:33 PM   PHQ-9 SCORE   PHQ-9 Total Score MyChart  12 (Moderate depression) 0 0 3 (Minimal depression) 2 (Minimal depression) 12 (Moderate depression)   PHQ-9 Total Score 0 12 0 0 3 2    2 12     GAD2:       8/20/2024     8:22 PM   GURMEET-2   Feeling nervous, anxious, or on edge 1    Not being able to stop or control worrying 0    GURMEET-2 Total Score 1    1       Patient-reported    Multiple values from one day are sorted in reverse-chronological order     GAD7:       5/30/2018     4:25 PM 7/12/2018     3:51 PM 9/25/2018     5:15 PM 11/21/2018     1:28 PM 1/23/2019    11:43 AM 8/30/2019     3:47 PM 9/24/2024     1:34 PM   GURMEET-7 SCORE   Total Score      2 (minimal anxiety) 6 (mild anxiety)   Total Score 2 3 8 6 7 2 6     PROMIS 10-Global Health (all questions and answers displayed):       8/20/2024     8:23 PM   PROMIS 10   In general, would you say your health is: Excellent   In general, would you say your quality of life is: Very good   In general, how would you rate your physical health? Excellent   In general, how would you rate your mental health, including your mood and your ability to think? Good   In general, how would you rate your satisfaction with your social activities and relationships? Fair   In general, please rate how well you carry out your usual social activities and roles Good   To what extent are you able to carry out your everyday physical activities such as walking, climbing stairs, carrying groceries, or moving a chair? Completely   In the past 7 days, how often have you been bothered by emotional problems such  as feeling anxious, depressed, or irritable? Sometimes   In the past 7 days, how would you rate your fatigue on average? Moderate   In the past 7 days, how would you rate your pain on average, where 0 means no pain, and 10 means worst imaginable pain? 5   In general, would you say your health is: 5    In general, would you say your quality of life is: 4    In general, how would you rate your physical health? 5    In general, how would you rate your mental health, including your mood and your ability to think? 3    In general, how would you rate your satisfaction with your social activities and relationships? 2    In general, please rate how well you carry out your usual social activities and roles. (This includes activities at home, at work and in your community, and responsibilities as a parent, child, spouse, employee, friend, etc.) 3    To what extent are you able to carry out your everyday physical activities such as walking, climbing stairs, carrying groceries, or moving a chair? 5    In the past 7 days, how often have you been bothered by emotional problems such as feeling anxious, depressed, or irritable? 3    In the past 7 days, how would you rate your fatigue on average? 3    In the past 7 days, how would you rate your pain on average, where 0 means no pain, and 10 means worst imaginable pain? 5    Global Mental Health Score 12    12   Global Physical Health Score 16    16   PROMIS TOTAL - SUBSCORES 28    28       Patient-reported    Multiple values from one day are sorted in reverse-chronological order     PROMIS 10-Global Health (only subscores and total score):       8/20/2024     8:23 PM   PROMIS-10 Scores Only   Global Mental Health Score 12    12   Global Physical Health Score 16    16   PROMIS TOTAL - SUBSCORES 28    28       Reason for Visit/Presenting Concern:  ADHD    Current Stressors / Issues:   MH update: Pt reports that since starting guanfacine he's noticed some improvements. No cravings for  nicotine or caffeine. He ha some fatigue the first couple of weeks but that has improved. His fiancee has said that he's more in control of his emotions. He's more able to slow down his thought processes which has enhanced his work. He's more in the moment. He and his fimayline are getting  09/2025. He is in regular contact with his M because she is an important part of his support system. He's been more able to read and comprehend what he is reading  Stresses: None  Appetite: unremarkable  Sleep: unremarkable  Therapy: No  LES: No  Preg: NA  Most important: medication update, no high heart rates on apple watch, resting 60-70's but jump to 130 if he stands up      Therapeutic Interventions:  Psycho-education: Provided psycho-education about patient's behavioral health condition and symptoms.    Response to treatment interventions:   Patient was receptive to interventions utilized.  Patient was engaged in the therapy process.       Progress on Treatment Objective(s) / Homework:   Satisfactory progress - ACTION (Actively working towards change); Intervened by reinforcing change plan / affirming steps taken     Medication Review:   No changes to current psychiatric medication(s)     Medication Compliance:   Yes     Chemical Use Review:  Substance Use: Chemical use reviewed, no active concerns identified      Tobacco Use: No current tobacco use.       Assessment: Current Emotional / Mental Status (status of significant symptoms):    Risk status (Self / Other harm or suicidal ideation)   Patient denies a history of suicidal ideation, suicide attempts, self-injurious behavior, homicidal ideation, homicidal behavior, and and other safety concerns   Patient denies current fears or concerns for personal safety.   Patient denies current or recent suicidal ideation or behaviors.   Patient denies current or recent homicidal ideation or behaviors.   Patient denies current or recent self injurious behavior or ideation.    Patient denies other safety concerns.   Recommended that patient call 911 or go to the local ED should there be a change in any of these risk factors      ASSESSMENT:   Mental Status:     Appearance:   Appropriate    Eye Contact:   Good    Psychomotor Behavior: Normal    Attitude:   Cooperative    Orientation:   All   Speech Rate / Production: Normal    Volume:   Normal    Mood:    Normal   Affect:    Appropriate    Thought Content:  Clear    Thought Form:  Coherent  Logical    Insight:    Good          Diagnoses:   Attention deficit hyperactivity disorder (ADHD), combined type    Collateral Reports Completed:   Collaborated with [facility] team        Plan: (Homework, other):   Patient was provided No indications of CD issues  Patient was given information about behavioral services and encouraged to schedule a follow up appointment with the clinic [facility] in 1 month.         [facility], [facility]     _____________________________________________________________________________________________________________________________________                                              Individual Treatment Plan    Patient's Name: Wilfrido Echavarria   YOB: 2000  Date of Creation: 11/12/2024  Date Treatment Plan Last Reviewed/Revised: pending    DSM5 Diagnoses: Attention-Deficit/Hyperactivity Disorder  314.01 (F90.2) Combined presentation  Psychosocial / Contextual Factors: Occupational Issues  PROMIS (reviewed every 90 days):   The following assessments were completed by patient for this visit:  PROMIS 10-Global Health (all questions and answers displayed):       8/20/2024     8:23 PM   PROMIS 10   In general, would you say your health is: Excellent   In general, would you say your quality of life is: Very good   In general, how would you rate your physical health? Excellent   In general, how would you rate your mental health, including your mood and your ability to think? Good   In general, how would you rate your  satisfaction with your social activities and relationships? Fair   In general, please rate how well you carry out your usual social activities and roles Good   To what extent are you able to carry out your everyday physical activities such as walking, climbing stairs, carrying groceries, or moving a chair? Completely   In the past 7 days, how often have you been bothered by emotional problems such as feeling anxious, depressed, or irritable? Sometimes   In the past 7 days, how would you rate your fatigue on average? Moderate   In the past 7 days, how would you rate your pain on average, where 0 means no pain, and 10 means worst imaginable pain? 5   In general, would you say your health is: 5    In general, would you say your quality of life is: 4    In general, how would you rate your physical health? 5    In general, how would you rate your mental health, including your mood and your ability to think? 3    In general, how would you rate your satisfaction with your social activities and relationships? 2    In general, please rate how well you carry out your usual social activities and roles. (This includes activities at home, at work and in your community, and responsibilities as a parent, child, spouse, employee, friend, etc.) 3    To what extent are you able to carry out your everyday physical activities such as walking, climbing stairs, carrying groceries, or moving a chair? 5    In the past 7 days, how often have you been bothered by emotional problems such as feeling anxious, depressed, or irritable? 3    In the past 7 days, how would you rate your fatigue on average? 3    In the past 7 days, how would you rate your pain on average, where 0 means no pain, and 10 means worst imaginable pain? 5    Global Mental Health Score 12    12   Global Physical Health Score 16    16   PROMIS TOTAL - SUBSCORES 28    28       Patient-reported    Multiple values from one day are sorted in reverse-chronological order     PROMIS  "10-Global Health (only subscores and total score):       8/20/2024     8:23 PM   PROMIS-10 Scores Only   Global Mental Health Score 12    12   Global Physical Health Score 16    16   PROMIS TOTAL - SUBSCORES 28    28        Referral / Collaboration:  Collaborated with [facility] team .    Anticipated number of session for this episode of care:  6-9 sessions  Anticipation frequency of session: Monthly  Anticipated Duration of each session: 16-37 minutes  Treatment plan will be reviewed in 90 days or when goals have been changed.     [facility] will provide psycho-education on skills to manage ADHD including:  Goal Setting  Externalizing Cues (notes, calendars,alarms, white board)  Use of Planner  Environmental Modification (wear headphones to manage sensory overload or having an escape plan to remove self from overwhelming situations, use of headphones to assist with studying e.g)  Breaking Task to Smaller Parts .    MeasurableTreatment Goal(s) related to diagnosis / functional impairment(s)  Goal 1: Patient will  experience improved attention   \"I will know I've met my goal when if I'm able to focus on on thing at a time.\"     Objective #A (Patient Action)  Patient will  experience improved attention  Status: New - Date: 11/12/2024      Intervention(s)  [facility] will Psycho-education: Provide psycho-education about patient's behavioral health condition and symptoms.       Patient has reviewed and agreed to the above plan.     Written by  JERMAINE Watson, [facility]       "

## 2024-11-12 ENCOUNTER — VIRTUAL VISIT (OUTPATIENT)
Dept: BEHAVIORAL HEALTH | Facility: CLINIC | Age: 24
End: 2024-11-12
Payer: COMMERCIAL

## 2024-11-12 ENCOUNTER — VIRTUAL VISIT (OUTPATIENT)
Dept: PSYCHIATRY | Facility: CLINIC | Age: 24
End: 2024-11-12
Payer: COMMERCIAL

## 2024-11-12 DIAGNOSIS — F41.1 GAD (GENERALIZED ANXIETY DISORDER): ICD-10-CM

## 2024-11-12 DIAGNOSIS — F33.1 MDD (MAJOR DEPRESSIVE DISORDER), RECURRENT EPISODE, MODERATE (H): ICD-10-CM

## 2024-11-12 DIAGNOSIS — F90.9 ATTENTION DEFICIT HYPERACTIVITY DISORDER (ADHD), UNSPECIFIED ADHD TYPE: ICD-10-CM

## 2024-11-12 DIAGNOSIS — F90.2 ATTENTION DEFICIT HYPERACTIVITY DISORDER (ADHD), COMBINED TYPE: Primary | ICD-10-CM

## 2024-11-12 PROCEDURE — 90832 PSYTX W PT 30 MINUTES: CPT | Mod: 95 | Performed by: SOCIAL WORKER

## 2024-11-12 PROCEDURE — 99214 OFFICE O/P EST MOD 30 MIN: CPT | Mod: 95

## 2024-11-12 PROCEDURE — G2211 COMPLEX E/M VISIT ADD ON: HCPCS | Mod: 95

## 2024-11-12 RX ORDER — GUANFACINE 2 MG/1
2 TABLET, EXTENDED RELEASE ORAL AT BEDTIME
Qty: 30 TABLET | Refills: 1 | Status: SHIPPED | OUTPATIENT
Start: 2024-11-12

## 2024-11-12 ASSESSMENT — PAIN SCALES - GENERAL: PAINLEVEL_OUTOF10: NO PAIN (0)

## 2024-11-12 NOTE — PROGRESS NOTES
[facility]  Collaborative Care Psychiatry Service ([facility])  MEDICAL PROGRESS NOTE     Wilfrido Echavarria is a 24 year old adult who prefers the name Wilfrido.     Initial consultation on 08/21/24.      CARE TEAM:   PCP- Yudith Retana  Therapist- None              Assessment & Plan     History and interview support the following diagnoses:   MDD  GURMEET  ADHD    Wilfrido is a 24-year-old adult with history of depression, anxiety, and ADHD who presents for psychiatric follow-up with [facility].     He was last seen 09/24/24 at which time guanfacine ER 1mg at bedtime was started. Today, Wilfrido reports numerous benefits from guanfacine use including improved sleep quality, less irritability, better focus, and fewer impulsive urges. He is tolerating guanfacine and denies dizziness, palpitations, or concerns for syncope. He does note that he can sense his heart rate increasing on standing however this has been longstanding; denies black spots in his vision. He has cut back significantly on caffeine and tobacco use. Given benefits and overall tolerability at starting dose, will increase guanfacine ER to 2mg nightly. Wilfrido is in agreement with this plan and knows to contact clinic should any concerns arise between visits. He knows to monitor for dizziness and understands the importance of changing positions slowly to reduce the risk of falls. He denies SI/HI.     Of note, we did discuss recent mild elevation in AST which could be due to strenuous exercise that Wilfrido takes part in regularly; he denies symptoms of hepatotoxicity including brown urine, yellowing of the eyes, unexplained weight loss/fatigue. Will repeat labs in ~1 month. EKG was completed 10/2024 and was unremarkable per cardiology read.     Assessment from initial consultation on 08/21/24:  Patient reports several different concerns today. He reports history of increased anger and difficulty  controlling his emotions. He has no history of physical aggression or violence towards others however at times he is verbally reactive and says things he later on regrets. This has impacted his relationships and his quality of life. He doesn't feel that depression is a current concern. Anxiety, impulsivity, and feeling tense and always on edge tend to be the symptoms that are most disruptive to his life. He does carry a diagnosis of ADHD and has only ever been on amphetamine based stimulants. He does feel that Adderall helps him fidget less but continues to have a hard time with focus and staying on track, especially when reading or attempting to study.    Patient's history is significant for notable trauma. He endorses regular trauma-related nightmares that can disrupt sleep at times. In general he feels that he is getting enough sleep since implemented a consistent sleep schedule. He carries genetic loading for bipolar disorder however based on history and assessment, he does not present with symptoms consistent with hypo/ade.     Patient reports symptoms of periodic tachycardia at rest based on alerts he receives from his watch. He also notes periodic orthostasis. Given these symptoms, I've asked him to complete an updated EKG through his PCP before we change his medication regimen. If symptoms of tachycardia persist or worsen, he was instructed to hold his Adderall dose and seek urgent medical care.     Future considerations:  -We discussed use of clonidine for behavioral dysregulation related to autonomic overactivation which may have some benefit for nightmares and concentration. Prone to orthostasis, must be titrated slowly. Once he repeats an EKG we will first focus on starting this medication as it will help target anxiety/overactivation/trauma nightmares/concentration. He also reports a history of tics, worsened in the setting of anxiety/started before stimulant use, which clonidine may also help treat.    -Switching from Adderall to methylphenidate which may somewhat reduce the risk of HTN and tachycardia. May also contribute less to agitation/overactivation.  -Consider use of selective serotonin reuptake inhibitor (Lexapro could be a good option) to target anxiety/mood symptoms.     Psychotherapy discussion: Primary recommendation for the treatment of mood symptoms, especially anxiety. Supportive therapy can be useful for reducing the impact of acute or chronic psychosocial stressors. CBT can be particularly helpful for ruminative thinking and addressing maladaptive coping mechanisms that may worsen anxiety.     PSYCHOTROPIC DRUG INTERACTIONS: **Italicized interactions are for treatment plan options not currently implemented**  none    MANAGEMENT:  N/A    [place] was not [place] today: Stimulants managed by PCP              Plan    1) PSYCHOTROPIC MEDICATION RECOMMENDATIONS:  -Increase guanfacine ER (Intuniv) to 2mg at bedtime for ADHD/anxiety    Prescribed Adderall IR 20mg QAM + Adderall IR 10mg Qafternoon by PCP    2) THERAPY: None    3) NEXT DUE:   Labs- AST elevated slightly (107) on 10/19/24. All other hepatic labs WNL. Likely due to strenuous exercise; plan to repeat AST/ALT in ~1 month.   ECG- EKG completed 10/21/24 and WNL per cardiology review.    4) REFERRALS / COORDINATION: None    5) DISPOSITION:   - Pt would benefit from brief psychiatric intervention (up to ~5 visits) to adjust meds and gauge for benefit.   - Follow up with SELMA Centeno CNP in 6 weeks. Plan to transition med management back to designated prescriber after brief care is complete.   - If not seen for 6 months, follow up and prescribing will automatically revert back to referring provider / PCP.     Treatment Risk Statement:  The patient understands the risks, benefits, adverse effects and alternatives. Agrees to treatment with the capacity to do so. No medical contraindications to treatment. Agrees to contact provider for any  "problems. The patient understands to call 911 or go to the nearest ED if urgent or life threatening symptoms occur. Crisis contact numbers are provided routinely in the After Visit Summary.    Suicide Risk Assessment: Wilfrido did not appear to be an imminent safety risk to self or others.    PROVIDER:  SELMA Centeno CNP    The [facility] psychiatry providers act as a specialty service for Primary Care Providers in the [facility] who seek to optimize medications for unstable patients. Once medications have been optimized, [facility] providers discharge the patient back to the referring Primary Care Provider for ongoing medication management. Care team has reviewed attendance agreement with patient. Patient advised that two failed appointments within 6 months may lead to termination of current episode of care.     Patient Status:  Patient will continue to be seen for ongoing consultation and stabilization.              History of Present Illness     Per [facility] during today's team-based visit:   \"Pt reports that since starting guanfacine he's noticed some improvements. No cravings for nicotine or caffeine. He ha some fatigue the first couple of weeks but that has improved. His fiancee has said that he's more in control of his emotions. He's more able to slow down his thought processes which has enhanced his work. He's more in the moment. He and his fiancee are getting  09/2025. He is in regular contact with his M because she is an important part of his support system. He's been more able to read and comprehend what he is reading  Stresses: None  Appetite: unremarkable  Sleep: unremarkable  Therapy: No  LES: No  Preg: NA  Most important: medication update, no high heart rates on apple watch, resting 60-70's but jump to 130 if he stands up\"    Wilfrido was last seen 09/24/24 at which time guanfacine ER 1mg at bedtime was started. Since the last visit:  -Has noticed a lot of positive changes since " "starting guanfacine especially with regards to sleep, irritability, impulsivity. No longer having cravings for tobacco pouches. Has cut back on caffeine significantly - has had some irritability since stopping caffeine but this has improved.  -Sleep has been better - notes that he is getting about an hour extra of REM sleep each night.Trauma dreams have improved.  -He feels very calm in the mornings, brain is not longer going \"a billion miles an hour.\" Less fidgety. No longer engaging in frequent blinking of his eyes which he believes started with Adderall use.   -Reports ongoing nasal congestion over the past several weeks. Planning to follow-up with PCP. No hx of seasonal allergies.   -No dizziness or symptoms of orthostasis. When he stands he can feel his heart racing- this has been ongoing. No palpitations - these have improved with guanfacine use.   -Anxiety tends to increase when stimulant wears off in the afternoon. Taking guanfacine around 6pm which helps with this.   -Denies SI/NSSI/HI.     Current psychotropic medication:  Adderall IR 20mg QAM + 10mg Qafternoon - takes medication daily. Unclear benefit.  Guanfacine ER 1mg nightly     Medication ASE: Denies  Medication adherence: No concerns    Recent Psych Symptoms:   Depression:  intermittent low energy, amotivation  Elevated:  none  Psychosis:  none  Anxiety:   Intermittent anxiety/racing thoughts. Less perseveration.  Trauma Related:  nightmares  Insomnia:  No  Other:  No    Pertinent Negatives: No suicidal or violent ideation, psychosis, or hypo/ade.      Pertinent Social Hx:  FINANCIAL SUPPORT- Working FT in IT.   LIVING SITUATION / RELATIONSHIPS- Lives with fiancee. Feels safe at home.    SOCIAL/ SPIRITUAL SUPPORT- Mother, linhancee    Pertinent Substance Use  Alcohol- None  Nicotine- None  Caffeine- Has significantly cut back. Using little to none.   Opioids- None  Narcan Kit- N/A  THC/CBD- None  Other Illicit Drugs- none              Medical Review " of Systems   Dizziness/orthostasis- Denies dizziness. Notes increased HR on standing. Denies syncope or pre-syncope.   Respiratory- Denies hx of asthma. No inhaler use ever.   Cardiovascular- Reports periods of increased heart rate at rest (up to 120s), has had several instances of this over the last few months.   Gastrointestinal- Endorses loose stools x3-4 times per week.   Tics, tremors- Hx of vocal tics/motor tics. Eye blinking daily - increases with anxiety.  Sexual health concerns- None  A comprehensive review of systems was performed and is negative other than noted above.    Denies family hx of sudden cardiac death or MI. Endorses family hx of HTN in siblings.               Psych Summary Points  08/2024: Initial consultation 08/21/24; no medication changes. EKG ordered due to report of tachycardia in the context of stimulant use.   09/2024: Started guanfacine ER 1mg at bedtime for ADHD/anxiety.   11/2024: Increased guanfacine ER to 2mg at bedtime.               Past Psychotropic Medications     Medication Max Dose (mg) Dates / Duration Helpful? DC Reason / Adverse Effects?   Wellbutrin       trazodone   N    sertraline   N Took [place]orota year of high school to freshman year of HS.    Adderall XR    ineffective   Strattera   N Rashes, nausea      No history of methylphenidate products              Past Medical History     Medication allergies:    Allergies   Allergen Reactions    Strattera [Atomoxetine] Other (See Comments)     Causing chest pain with AM dose and mind fogginess in PM dose       Neurologic Hx [head injury, seizures, etc.]: Denies hx of seizure. Endorses suspected hx of concussion but unable to recall whether this was ever formally diagnosed.  Patient Active Problem List   Diagnosis    Attention deficit hyperactivity disorder (ADHD), unspecified ADHD type    Acne    Hypertrophy of breast    Sciatica without back pain, unspecified laterality    Family history of hypertension    Piriformis syndrome,  right    Elevated fasting glucose     Past Medical History:   Diagnosis Date    ADHD (attention deficit hyperactivity disorder)     Anxiety     Depression                  Medications   Current Outpatient Medications   Medication Sig Dispense Refill    amphetamine-dextroamphetamine (ADDERALL) 10 MG tablet Take 1 tablet (10 mg) by mouth daily. In the afternoon 30 tablet 0    amphetamine-dextroamphetamine (ADDERALL) 20 MG tablet Take 1 tablet (20 mg) by mouth every morning. 30 tablet 0    guanFACINE (INTUNIV) 1 MG TB24 24 hr tablet Take 1 tablet (1 mg) by mouth at bedtime. 30 tablet 0                 Physical Exam  (Vitals Only)  There were no vitals taken for this visit.    Pulse Readings from Last 5 Encounters:   06/03/24 76   03/05/24 84   09/07/23 63   05/25/23 82   02/10/23 74     Wt Readings from Last 5 Encounters:   08/21/24 87.5 kg (193 lb)   06/03/24 90.3 kg (199 lb)   03/05/24 82.7 kg (182 lb 6.4 oz)   09/07/23 95.1 kg (209 lb 9.6 oz)   05/25/23 95.7 kg (211 lb)     BP Readings from Last 5 Encounters:   06/03/24 120/54   03/05/24 132/84   09/07/23 124/76   05/25/23 128/72   02/10/23 112/68                 Mental Status Exam  Alertness: alert  and oriented  Appearance: adequately groomed  Behavior/Demeanor: cooperative, pleasant, and calm, with good eye contact   Speech: normal and regular rate and rhythm  Language: intact and no problems  Psychomotor: normal or unremarkable  Mood: anxious  Affect: full range and appropriate; was congruent to mood  Thought Process/Associations: unremarkable  Thought Content:  Reports none;  Denies suicidal ideation, violent ideation, and delusions  Perception:  Reports none;  Denies auditory hallucinations and visual hallucinations  Insight: intact  Judgment: intact  Cognition: does  appear grossly intact; formal cognitive testing was not done  oriented: time, person, and place  Gait and Station:  N/A - telehealth                Data       8/20/2024     8:23 PM   PROMIS-10  Total Score w/o Sub Scores   PROMIS TOTAL - SUBSCORES 28    28         8/20/2024     8:23 PM   CAGE-AID Total Score   Total Score 0    0   Total Score MyChart 0 (A total score of 2 or greater is considered clinically significant)         3/5/2024     6:54 AM 8/20/2024     8:07 PM 9/24/2024     1:33 PM   PHQ   PHQ-9 Total Score 3 2    2 12   Q9: Thoughts of better off dead/self-harm past 2 weeks Not at all  Not at all  Not at all        Patient-reported    Multiple values from one day are sorted in reverse-chronological order         1/23/2019    11:43 AM 8/30/2019     3:47 PM 9/24/2024     1:34 PM   GURMEET-7 SCORE   Total Score  2 (minimal anxiety) 6 (mild anxiety)   Total Score 7 2 6       Liver/kidney function Metabolic Blood counts   Recent Labs   Lab Test 10/19/24  1429 04/30/18  1553   CR 1.17 1.15*   * 27   ALT 46 33   ALKPHOS 80 131    Recent Labs   Lab Test 10/19/24  1429 06/13/24  1020 09/07/23  0715   CHOL  --  158  --    TRIG  --  29  --    LDL  --  83  --    HDL  --  69  --    A1C  --   --  5.1   TSH 1.28  --  2.00    Recent Labs   Lab Test 12/09/23  1506   WBC 5.4   HGB 15.7   HCT 44.3   MCV 86           ECG results from 10/21/24   EKG 12-lead, tracing only     Value    Systolic Blood Pressure     Diastolic Blood Pressure     Ventricular Rate 73    Atrial Rate 73    AK Interval 142    QRS Duration 110        QTc 429    P Axis 61    R AXIS 83    T Axis 60    Interpretation ECG      Sinus rhythm with sinus arrhythmia  Normal ECG    Confirmed by MD SIMMONS STEVEN ([id number]) on 10/22/2024 5:20:24 PM         Administrative Billing:     Level of Medical Decision Making:   - At least 1 chronic problem that is not stable  - Engaged in prescription drug management during visit (discussed any medication benefits, side effects, alternatives, etc.)    The longitudinal plan of care for the diagnosis(es)/condition(s) as documented above were addressed during this visit. Due to the added complexity in  care, I will continue to support Wilfrido in the subsequent management and with ongoing continuity of care.

## 2024-11-12 NOTE — NURSING NOTE
Current patient location: [address] [place] [address] [address] [place] [address]    Is the patient currently in the state of [place]? YES    Visit mode:VIDEO    If the visit is dropped, the patient can be reconnected by:VIDEO VISIT: Text to cell phone:   Telephone Information:   Mobile [phone]    and VIDEO VISIT: Send to e-mail at: [email or web address]    Will anyone else be joining the visit? NO  (If patient encounters technical issues they should call [phone])    Are changes needed to the allergy or medication list? No    Are refills needed on medications prescribed by this physician? Discuss with provider    Rooming Documentation:  Questionnaire(s) completed    Reason for visit: RECHECK    Alan HENRIQUEZF

## 2024-11-12 NOTE — PATIENT INSTRUCTIONS
Medication Plan  -Increase guanfacine ER (Intuniv) to 2mg at bedtime for ADHD/anxiety    **For crisis resources, please see the information at the end of this document**   Patient Education    Thank you for coming to the [facility].     Lab Testing:  If you had lab testing today and your results are reassuring or normal they will be mailed to you or sent through [employer] within 7 days. If the lab tests need quick action we will call you with the results. The phone number we will call with results is # [phone]. If this is not the best number please call our clinic and change the number.     Medication Refills:  If you need any refills please call your pharmacy and they will contact us.   Three business days of notice are needed for general medication refill requests.   Five business days of notice are needed for controlled substance refill requests.   If you need to change to a different pharmacy, please contact the new pharmacy directly. The new pharmacy will help you get your medications transferred.     Contact Us:  Please call [phone] during business hours (8-5:00 M-F).     Financial Assistance [phone]   Medical Records [phone]       MENTAL HEALTH CRISIS RESOURCES:  For a emergency help, please call 911 or go to the nearest Emergency Department.     Emergency Walk-In Options:   EmPATH Unit @ [facility] ([place]): [phone] - Specialized mental health emergency area designed to be calming  [facility] ([place]): [phone]  [facility] ([place]): [phone]  [facility]): [phone]    County Crisis Information:   [place]: [phone]  [place]: [phone]  [place] ([place]) - Adult: [phone]     Child: [phone]  [place] - Adult: [phone]     Child: [phone]  [place]: [phone]  List of all [facility] resources:    https://[place].gov/dhs/people-we-serve/adults/health-care/mental-health/resources/crisis-contacts.jsp    National Crisis Information:   Crisis Text Line: Text  [place]  to 192620  Suicide & Crisis Lifeline: 988  National Suicide Prevention Lifeline: 9-922-368-TALK (1-136.488.8430)       For online chat options, visit https://suicidepreventionlifeline.org/chat/  Poison Control Center: 6-315-281-7605  Trans Lifeline: 0-035-703-0142 - Hotline for transgender people of all ages  The [employer] Project: 8-547-882-6049 - Hotline for LGBT youth     For Non-Emergency Support:   Fast Tracker: Mental Health & Substance Use Disorder Resources -   https://www.FastCAPckIoxusn.org/

## 2024-12-08 ENCOUNTER — MYC REFILL (OUTPATIENT)
Dept: FAMILY MEDICINE | Facility: CLINIC | Age: 24
End: 2024-12-08
Payer: COMMERCIAL

## 2024-12-08 DIAGNOSIS — F90.9 ATTENTION DEFICIT HYPERACTIVITY DISORDER (ADHD), UNSPECIFIED ADHD TYPE: ICD-10-CM

## 2024-12-09 RX ORDER — DEXTROAMPHETAMINE SACCHARATE, AMPHETAMINE ASPARTATE, DEXTROAMPHETAMINE SULFATE AND AMPHETAMINE SULFATE 5; 5; 5; 5 MG/1; MG/1; MG/1; MG/1
20 TABLET ORAL EVERY MORNING
Qty: 30 TABLET | Refills: 0 | Status: SHIPPED | OUTPATIENT
Start: 2024-12-09

## 2024-12-09 RX ORDER — DEXTROAMPHETAMINE SACCHARATE, AMPHETAMINE ASPARTATE, DEXTROAMPHETAMINE SULFATE AND AMPHETAMINE SULFATE 2.5; 2.5; 2.5; 2.5 MG/1; MG/1; MG/1; MG/1
10 TABLET ORAL DAILY
Qty: 30 TABLET | Refills: 0 | Status: SHIPPED | OUTPATIENT
Start: 2024-12-09

## 2024-12-09 NOTE — TELEPHONE ENCOUNTER
Per [place] PDMP:    Adderall IR 20mg tablets last filled #30 on 11/07/24 per PCP, Yudith HAHN  Adderall IR 10mg tablets last filled #30 on 11/07/24 by PCP    Patient currently under short term psychiatry care with [facility]. Rxs signed.    SELMA Centeno CNP on 12/9/2024 at 2:20 PM

## 2024-12-20 ENCOUNTER — MYC REFILL (OUTPATIENT)
Dept: PSYCHIATRY | Facility: CLINIC | Age: 24
End: 2024-12-20
Payer: COMMERCIAL

## 2024-12-20 DIAGNOSIS — F90.9 ATTENTION DEFICIT HYPERACTIVITY DISORDER (ADHD), UNSPECIFIED ADHD TYPE: ICD-10-CM

## 2024-12-20 DIAGNOSIS — F33.1 MDD (MAJOR DEPRESSIVE DISORDER), RECURRENT EPISODE, MODERATE (H): ICD-10-CM

## 2024-12-20 DIAGNOSIS — F41.1 GAD (GENERALIZED ANXIETY DISORDER): ICD-10-CM

## 2024-12-23 RX ORDER — GUANFACINE 2 MG/1
2 TABLET, EXTENDED RELEASE ORAL AT BEDTIME
Qty: 30 TABLET | Refills: 1 | OUTPATIENT
Start: 2024-12-23

## 2024-12-23 NOTE — TELEPHONE ENCOUNTER
RN received refill request for guanfacine via [employer].  This is refused as patient should have sufficient supply to reach appt with provider on 12/31/24.  This was a dose change at last appointment and efficacy will be reviewed at upcoming appt.  Patient informed via [employer] message.

## 2024-12-30 NOTE — PROGRESS NOTES
[facility]    Behavioral Health Clinician Progress Note   Mental Health & Addiction Services      Dec 31st, 2024    Patient Name: Wilfrido Echavarria       Service Type:  Individual   Service Location:  Poll Everywhere / Email (patient reached)   Visit Start Time: 730 AM  Visit End Time:  748 AM   Session Length: 16 - 37    Attendees: Patient   Service Modality: Video Visit:      Provider verified identity through the following two step process.  Patient provided:  Patient photo and Patient is known previously to provider    Telemedicine Visit: The patient's condition can be safely assessed and treated via synchronous audio and visual telemedicine encounter.      Reason for Telemedicine Visit: Patient convenience (e.g. access to timely appointments / distance to available provider)    Originating Site (Patient Location): Patient's home    Distant Site (Provider Location): Provider Remote Setting- Home Office    Consent:  The patient/guardian has verbally consented to: the potential risks and benefits of telemedicine (video visit) versus in person care; bill my insurance or make self-payment for services provided; and responsibility for payment of non-covered services.     Patient would like the video invitation sent by:  My Chart    Mode of Communication:  Video Conference via [facility]    Distant Location (Provider):  Off-site    As the provider I attest to compliance with applicable laws and regulations related to telemedicine.   Visit number: 3    [facility] Visit Activities (Refresh list every visit): [facility] Only     Diagnostic Assessment Date: 08/21/2024   Treatment Plan Review Date: 02/12/2025      DATA:    Extended Session (60+ minutes): No   Interactive Complexity: No   Crisis: No   [facility] Patient: No     Assessments completed prior to visit:   PHQ2:       12/31/2024     7:17 AM 9/24/2024     1:32 PM 3/5/2024     8:15 AM   PHQ-2 ( 2000 [employer])   Q1: Little interest or pleasure in  doing things 1 2 0   Q2: Feeling down, depressed or hopeless 1 0 0   PHQ-2 Score 2  2 0   Q1: Little interest or pleasure in doing things Several days More than half the days    Q2: Feeling down, depressed or hopeless Several days Not at all    PHQ-2 Score 2 2        Patient-reported     PHQ9:       11/6/2019    11:35 AM 10/20/2022    10:40 AM 2/10/2023     9:05 AM 9/7/2023     5:11 AM 3/5/2024     6:54 AM 8/20/2024     8:07 PM 9/24/2024     1:33 PM   PHQ-9 SCORE   PHQ-9 Total Score MyChart  12 (Moderate depression) 0 0 3 (Minimal depression) 2 (Minimal depression) 12 (Moderate depression)   PHQ-9 Total Score 0 12 0 0 3 2    2 12     GAD2:       8/20/2024     8:22 PM 12/31/2024     7:17 AM   GURMEET-2   Feeling nervous, anxious, or on edge 1 1   Not being able to stop or control worrying 0 1   GURMEET-2 Total Score 1    1 2        Patient-reported     GAD7:       5/30/2018     4:25 PM 7/12/2018     3:51 PM 9/25/2018     5:15 PM 11/21/2018     1:28 PM 1/23/2019    11:43 AM 8/30/2019     3:47 PM 9/24/2024     1:34 PM   GURMEET-7 SCORE   Total Score      2 (minimal anxiety) 6 (mild anxiety)   Total Score 2 3 8 6 7 2 6     PROMIS 10-Global Health (all questions and answers displayed):       8/20/2024     8:23 PM 12/31/2024     7:18 AM   PROMIS 10   In general, would you say your health is: Excellent Good   In general, would you say your quality of life is: Very good Very good   In general, how would you rate your physical health? Excellent Excellent   In general, how would you rate your mental health, including your mood and your ability to think? Good Good   In general, how would you rate your satisfaction with your social activities and relationships? Fair Fair   In general, please rate how well you carry out your usual social activities and roles Good Good   To what extent are you able to carry out your everyday physical activities such as walking, climbing stairs, carrying groceries, or moving a chair? Completely Mostly   In  the past 7 days, how often have you been bothered by emotional problems such as feeling anxious, depressed, or irritable? Sometimes Often   In the past 7 days, how would you rate your fatigue on average? Moderate Moderate   In the past 7 days, how would you rate your pain on average, where 0 means no pain, and 10 means worst imaginable pain? 5 2   In general, would you say your health is: 5 3   In general, would you say your quality of life is: 4 4   In general, how would you rate your physical health? 5 5   In general, how would you rate your mental health, including your mood and your ability to think? 3 3   In general, how would you rate your satisfaction with your social activities and relationships? 2 2   In general, please rate how well you carry out your usual social activities and roles. (This includes activities at home, at work and in your community, and responsibilities as a parent, child, spouse, employee, friend, etc.) 3 3   To what extent are you able to carry out your everyday physical activities such as walking, climbing stairs, carrying groceries, or moving a chair? 5 4   In the past 7 days, how often have you been bothered by emotional problems such as feeling anxious, depressed, or irritable? 3 4   In the past 7 days, how would you rate your fatigue on average? 3 3   In the past 7 days, how would you rate your pain on average, where 0 means no pain, and 10 means worst imaginable pain? 5 2   Global Mental Health Score 12    12 11   Global Physical Health Score 16    16 16   PROMIS TOTAL - SUBSCORES 28    28 27     PROMIS 10-Global Health (only subscores and total score):       8/20/2024     8:23 PM 12/31/2024     7:18 AM   PROMIS-10 Scores Only   Global Mental Health Score 12    12 11   Global Physical Health Score 16    16 16   PROMIS TOTAL - SUBSCORES 28    28 27       Reason for Visit/Presenting Concern:  ADHD    Current Stressors / Issues:   MH update: Pt reports that he ran out of his  guanfacine on 12/21. Since then he's noticed that his resting heart rate is a little higher 85-90, waking up with stomach cramps.He requested refills but the were rejected because it said he should've had enough. His sx have been increasing since about 4 weeks ago, increased irritability, decreased impulse control, getting overwhelmed. Feeling like he's in a rut, low motivation, decreased appetite. C and pt explored ways to improve his sx. He reports that in the past exercise has been helpful but he hasn't had the motivation to go lately. C positively reinforced and encouraged. C explored ways to increase his exercise such as using a workout cayetano. Reports that he has poor support system. C and pt explored ways to expand this such as using the [employer] to find groups that share some of his interests. Receptive.   Stresses: none  Appetite: poor  Sleep: unremarkable  Therapy: No but finances are a concern, [facility] explained what an EAP was and suggested he ask his company if they have one.  LES: No  Preg: NA  Most important: medication update        Therapeutic Interventions:  Psycho-education: Provided psycho-education about patient's behavioral health condition and symptoms.    Response to treatment interventions:   Patient was receptive to interventions utilized.  Patient was engaged in the therapy process.       Progress on Treatment Objective(s) / Homework:   Satisfactory progress - ACTION (Actively working towards change); Intervened by reinforcing change plan / affirming steps taken     Medication Review:   No changes to current psychiatric medication(s)     Medication Compliance:   Yes     Chemical Use Review:  Substance Use: Chemical use reviewed, no active concerns identified      Tobacco Use: No current tobacco use.       Assessment: Current Emotional / Mental Status (status of significant symptoms):    Risk status (Self / Other harm or suicidal ideation)   Patient denies a history of suicidal  ideation, suicide attempts, self-injurious behavior, homicidal ideation, homicidal behavior, and and other safety concerns   Patient denies current fears or concerns for personal safety.   Patient denies current or recent suicidal ideation or behaviors.   Patient denies current or recent homicidal ideation or behaviors.   Patient denies current or recent self injurious behavior or ideation.   Patient denies other safety concerns.   Recommended that patient call 911 or go to the local ED should there be a change in any of these risk factors      ASSESSMENT:   Mental Status:     Appearance:   Appropriate    Eye Contact:   Good    Psychomotor Behavior: Normal    Attitude:   Cooperative    Orientation:   All   Speech Rate / Production: Normal    Volume:   Normal    Mood:    Depressed    Affect:    Appropriate    Thought Content:  Clear    Thought Form:  Coherent  Logical    Insight:    Good          Diagnoses:   Attention deficit hyperactivity disorder (ADHD), combined type    Collateral Reports Completed:   Collaborated with [facility] team        Plan: (Homework, other):   Patient was provided No indications of CD issues  Patient was given information about behavioral services and encouraged to schedule a follow up appointment with the clinic [facility] in 1 month.         JERMAINE Watson, [facility]     _____________________________________________________________________________________________________________________________________                                              Individual Treatment Plan    Patient's Name: Wilfrido Echavarria   YOB: 2000  Date of Creation: 11/12/2024  Date Treatment Plan Last Reviewed/Revised: pending    DSM5 Diagnoses: Attention-Deficit/Hyperactivity Disorder  314.01 (F90.2) Combined presentation  Psychosocial / Contextual Factors: Occupational Issues  PROMIS (reviewed every 90 days):   The following assessments were completed by patient for this visit:  PROMIS 10-Global Health (all  questions and answers displayed):       8/20/2024     8:23 PM 12/31/2024     7:18 AM   PROMIS 10   In general, would you say your health is: Excellent Good   In general, would you say your quality of life is: Very good Very good   In general, how would you rate your physical health? Excellent Excellent   In general, how would you rate your mental health, including your mood and your ability to think? Good Good   In general, how would you rate your satisfaction with your social activities and relationships? Fair Fair   In general, please rate how well you carry out your usual social activities and roles Good Good   To what extent are you able to carry out your everyday physical activities such as walking, climbing stairs, carrying groceries, or moving a chair? Completely Mostly   In the past 7 days, how often have you been bothered by emotional problems such as feeling anxious, depressed, or irritable? Sometimes Often   In the past 7 days, how would you rate your fatigue on average? Moderate Moderate   In the past 7 days, how would you rate your pain on average, where 0 means no pain, and 10 means worst imaginable pain? 5 2   In general, would you say your health is: 5 3   In general, would you say your quality of life is: 4 4   In general, how would you rate your physical health? 5 5   In general, how would you rate your mental health, including your mood and your ability to think? 3 3   In general, how would you rate your satisfaction with your social activities and relationships? 2 2   In general, please rate how well you carry out your usual social activities and roles. (This includes activities at home, at work and in your community, and responsibilities as a parent, child, spouse, employee, friend, etc.) 3 3   To what extent are you able to carry out your everyday physical activities such as walking, climbing stairs, carrying groceries, or moving a chair? 5 4   In the past 7 days, how often have you been  "bothered by emotional problems such as feeling anxious, depressed, or irritable? 3 4   In the past 7 days, how would you rate your fatigue on average? 3 3   In the past 7 days, how would you rate your pain on average, where 0 means no pain, and 10 means worst imaginable pain? 5 2   Global Mental Health Score 12    12 11   Global Physical Health Score 16    16 16   PROMIS TOTAL - SUBSCORES 28    28 27     PROMIS 10-Global Health (only subscores and total score):       8/20/2024     8:23 PM 12/31/2024     7:18 AM   PROMIS-10 Scores Only   Global Mental Health Score 12    12 11   Global Physical Health Score 16    16 16   PROMIS TOTAL - SUBSCORES 28    28 27        Referral / Collaboration:  Collaborated with [facility] team .    Anticipated number of session for this episode of care:  6-9 sessions  Anticipation frequency of session: Monthly  Anticipated Duration of each session: 16-37 minutes  Treatment plan will be reviewed in 90 days or when goals have been changed.     [facility] will provide psycho-education on skills to manage ADHD including:  Goal Setting  Externalizing Cues (notes, calendars,alarms, white board)  Use of Planner  Environmental Modification (wear headphones to manage sensory overload or having an escape plan to remove self from overwhelming situations, use of headphones to assist with studying e.g)  Breaking Task to Smaller Parts .    MeasurableTreatment Goal(s) related to diagnosis / functional impairment(s)  Goal 1: Patient will  experience improved attention   \"I will know I've met my goal when if I'm able to focus on on thing at a time.\"     Objective #A (Patient Action)  Patient will  experience improved attention  Status: New - Date: 11/12/2024      Intervention(s)  [facility] will Psycho-education: Provide psycho-education about patient's behavioral health condition and symptoms.       Patient has reviewed and agreed to the above plan.     Written by  Heavenly Flores [facility], [facility]       "

## 2024-12-30 NOTE — PROGRESS NOTES
[facility]  Collaborative Care Psychiatry Service ([facility])  MEDICAL PROGRESS NOTE     Wilfrido Echavarria is a 24 year old adult who prefers the name Wilfrido.     Initial consultation on 08/21/24.      CARE TEAM:   PCP- Yudith Retana  Therapist- None              Assessment & Plan     History and interview support the following diagnoses:   MDD  GURMEET  ADHD    Wilfrido is a 24-year-old adult with history of depression, anxiety, and ADHD who [facility].     He was last seen 11/12/24 at which time guanfacine ER was increased to 2mg at bedtime. Today, Wilfrido reports worsening anxiety and dysphoric mood in the setting of stopping guanfacine about 10 days ago due to issues with the pharmacy. Prior to running out he had been tolerating the higher dose of guanfacine. He is agreeable with plan to restart the medication due to benefit for anxiety, ADHD, etc. He denies suicidal or homicidal ideation. He may benefit from the addition of an antidepressant in the future. I've asked Wilfrido to message me in 1-2 weeks if mood does not improve; he was agreeable with this plan.     Of note, we did discuss recent mild elevation in AST which could be due to strenuous exercise that Wilfrido takes part in regularly; he denies symptoms of hepatotoxicity including brown urine, yellowing of the eyes, unexplained weight loss/fatigue. Will repeat labs in ~1 month ([employer] reminder sent today, 12/31/24). EKG was completed 10/2024 and was unremarkable per cardiology read.     Assessment from initial consultation on 08/21/24:  Patient reports several different concerns today. He reports history of increased anger and difficulty controlling his emotions. He has no history of physical aggression or violence towards others however at times he is verbally reactive and says things he later on regrets. This has impacted his relationships and his  quality of life. He doesn't feel that depression is a current concern. Anxiety, impulsivity, and feeling tense and always on edge tend to be the symptoms that are most disruptive to his life. He does carry a diagnosis of ADHD and has only ever been on amphetamine based stimulants. He does feel that Adderall helps him fidget less but continues to have a hard time with focus and staying on track, especially when reading or attempting to study.    Patient's history is significant for notable trauma. He endorses regular trauma-related nightmares that can disrupt sleep at times. In general he feels that he is getting enough sleep since implemented a consistent sleep schedule. He carries genetic loading for bipolar disorder however based on history and assessment, he does not present with symptoms consistent with hypo/ade.     Patient reports symptoms of periodic tachycardia at rest based on alerts he receives from his watch. He also notes periodic orthostasis. Given these symptoms, I've asked him to complete an updated EKG through his PCP before we change his medication regimen. If symptoms of tachycardia persist or worsen, he was instructed to hold his Adderall dose and seek urgent medical care.     Future considerations:  -We discussed use of clonidine for behavioral dysregulation related to autonomic overactivation which may have some benefit for nightmares and concentration. Prone to orthostasis, must be titrated slowly. Once he repeats an EKG we will first focus on starting this medication as it will help target anxiety/overactivation/trauma nightmares/concentration. He also reports a history of tics, worsened in the setting of anxiety/started before stimulant use, which clonidine may also help treat.   -Switching from Adderall to methylphenidate which may somewhat reduce the risk of HTN and tachycardia. May also contribute less to agitation/overactivation.  -Consider use of selective serotonin reuptake inhibitor  (Lexapro could be a good option) to target anxiety/mood symptoms.     Psychotherapy discussion: Primary recommendation for the treatment of mood symptoms, especially anxiety. Supportive therapy can be useful for reducing the impact of acute or chronic psychosocial stressors. CBT can be particularly helpful for ruminative thinking and addressing maladaptive coping mechanisms that may worsen anxiety.     PSYCHOTROPIC DRUG INTERACTIONS: **Italicized interactions are for treatment plan options not currently implemented**  none    MANAGEMENT:  N/A    [place]   Adderall IR 10mg and 20mg tablets last filled 12/09/24 for a 30 d/s              Plan    1) PSYCHOTROPIC MEDICATION RECOMMENDATIONS:  -Restart guanfacine ER 1mg at bedtime for 7 days, THEN increase back to 2mg.   -Continue Adderall IR 20mg QAM + Adderall IR 10mg Qafternoon    2) THERAPY: None    3) NEXT DUE:   Labs- AST elevated slightly (107) on 10/19/24. All other hepatic labs WNL. Likely due to strenuous exercise; plan to repeat AST/ALT in ~1 month.   ECG- EKG completed 10/21/24 and WNL per cardiology review.    4) REFERRALS / COORDINATION: None    5) DISPOSITION:   - Pt would benefit from brief psychiatric intervention (up to ~5 visits) to adjust meds and gauge for benefit.   - Follow up with SELMA Centeno CNP in 4-5 weeks. Plan to transition med management back to designated prescriber after brief care is complete.   - If not seen for 6 months, follow up and prescribing will automatically revert back to referring provider / PCP.     Treatment Risk Statement:  The patient understands the risks, benefits, adverse effects and alternatives. Agrees to treatment with the capacity to do so. No medical contraindications to treatment. Agrees to contact provider for any problems. The patient understands to call 911 or go to the nearest ED if urgent or life threatening symptoms occur. Crisis contact numbers are provided routinely in the After Visit  "Summary.    Suicide Risk Assessment: Wilfrido did not appear to be an imminent safety risk to self or others.    PROVIDER:  SELMA Centeno CNP    The [facility] psychiatry providers act as a specialty service for Primary Care Providers in the [facility] who seek to optimize medications for unstable patients. Once medications have been optimized, [facility] providers discharge the patient back to the referring Primary Care Provider for ongoing medication management. Care team has reviewed attendance agreement with patient. Patient advised that two failed appointments within 6 months may lead to termination of current episode of care.     Patient Status:  Patient will continue to be seen for ongoing consultation and stabilization.              Interim History    Per [facility] Heavenly Flores during today's team-based visit:   Pt reports that he ran out of his guanfacine on 12/21. Since then he's noticed that his resting heart rate is a little higher 85-90, waking up with stomach cramps.He requested refills but the were rejected because it said he should've had enough. His sx have been increasing since about 4 weeks ago, increased irritability, decreased impulse control, getting overwhelmed. Feeling like he's in a rut, low motivation, decreased appetite. [facility] and pt explored ways to improve his sx. He reports that in the past exercise has been helpful but he hasn't had the motivation to go lately. [facility] positively reinforced and encouraged. [facility] explored ways to increase his exercise such as using a workout cayetano. Reports that he has poor support system. [facility] and pt explored ways to expand this such as using the [employer] zhen to find groups that share some of his interests. Receptive.   Stresses: none  Appetite: poor  Sleep: unremarkable  Therapy: No but finances are a concern, [facility] explained what an EAP was and suggested he ask his company if they have one.  LES: No  Preg: NA  Most important: medication update\"    Wilfrido was " last seen 11/12/24 at which time guanfacine ER was increased to 2mg at bedtime. Since the last visit:  -He ran out of guanfacine about 10 days ago.   -He tells me he didn't notice much change from the higher dose of Intuniv. No dizziness or lightheadedness.  -Anxiety has been higher. Finances are a concern - notes that he has been ruminating, poor impulse control. Notes that he has been binge eating more frequently. Mood has been unhappy for the past 3 weeks.  -Denies SI/NSSI/HI    Current psychotropic medication:  Adderall IR 20mg QAM + 10mg Qafternoon - takes medication daily. Unclear benefit.  Guanfacine ER 1mg nightly - ran out about 10 days ago - not currently taking    Medication ASE: Denies  Medication adherence: No concerns    Recent Psych Symptoms:   Depression:  intermittent low energy, amotivation, isolation, withdrawn   Elevated:  none  Psychosis:  none  Anxiety:   Social anxiety, increased rumination  Trauma Related:  nightmares  Insomnia:  No  Other:  No    Pertinent Negatives: No suicidal or violent ideation, psychosis, or hypo/ade.      Pertinent Social Hx:  FINANCIAL SUPPORT- Working FT in IT. Finances are a stressor.   LIVING SITUATION / RELATIONSHIPS- Lives with fiancee. Feels safe at home.    SOCIAL/ SPIRITUAL SUPPORT- Mother, linhancee    Pertinent Substance Use  Alcohol- None  Nicotine- None  Caffeine- Has significantly cut back. Using little to none.   Opioids- None  Narcan Kit- N/A  THC/CBD- None  Other Illicit Drugs- none              Medical Review of Systems   Dizziness/orthostasis- Denies dizziness. Notes increased HR on standing. Denies syncope or pre-syncope.   Respiratory- Denies hx of asthma. No inhaler use ever.   Cardiovascular- Reports periods of increased heart rate at rest (up to 120s), has had several instances of this over the last few months.   Gastrointestinal- Endorses loose stools x3-4 times per week.   Tics, tremors- Hx of vocal tics/motor tics. Eye blinking daily -  increases with anxiety.  Sexual health concerns- None  A comprehensive review of systems was performed and is negative other than noted above.    Denies family hx of sudden cardiac death or MI. Endorses family hx of HTN in siblings.               Psych Summary Points  08/2024: Initial consultation 08/21/24; no medication changes. EKG ordered due to report of tachycardia in the context of stimulant use.   09/2024: Started guanfacine ER 1mg at bedtime for ADHD/anxiety.   11/2024: Increased guanfacine ER to 2mg at bedtime.   12/2024: Wilfrido ran out of guanfacine about 10 days ago. Restarted at 1mg at bedtime x7 days, then back to 2mg nightly.               Past Psychotropic Medications     Medication Max Dose (mg) Dates / Duration Helpful? DC Reason / Adverse Effects?   Wellbutrin       trazodone   N    sertraline   N Took dorota year of high school to freshman year of HS.    Adderall XR    ineffective   Strattera   N Rashes, nausea      No history of methylphenidate products              Past Medical History     Medication allergies:    Allergies   Allergen Reactions    Strattera [Atomoxetine] Other (See Comments)     Causing chest pain with AM dose and mind fogginess in PM dose       Neurologic Hx [head injury, seizures, etc.]: Denies hx of seizure. Endorses suspected hx of concussion but unable to recall whether this was ever formally diagnosed.  Patient Active Problem List   Diagnosis    Attention deficit hyperactivity disorder (ADHD), unspecified ADHD type    Acne    Hypertrophy of breast    Sciatica without back pain, unspecified laterality    Family history of hypertension    Piriformis syndrome, right    Elevated fasting glucose     Past Medical History:   Diagnosis Date    ADHD (attention deficit hyperactivity disorder)     Anxiety     Depression                  Medications   Current Outpatient Medications   Medication Sig Dispense Refill    [START ON 1/6/2025] amphetamine-dextroamphetamine (ADDERALL) 10 MG  tablet Take 1 tablet (10 mg) by mouth daily. In the afternoon 30 tablet 0    [START ON 1/6/2025] amphetamine-dextroamphetamine (ADDERALL) 20 MG tablet Take 1 tablet (20 mg) by mouth every morning. 30 tablet 0    guanFACINE (INTUNIV) 1 MG TB24 24 hr tablet Take 1 tablet (1 mg) by mouth at bedtime for 7 days. THEN increase back to 2mg nightly 7 tablet 0    [START ON 1/7/2025] guanFACINE (INTUNIV) 2 MG TB24 24 hr tablet Take 1 tablet (2 mg) by mouth at bedtime. 30 tablet 1                 Physical Exam  (Vitals Only)  There were no vitals taken for this visit.    Pulse Readings from Last 5 Encounters:   06/03/24 76   03/05/24 84   09/07/23 63   05/25/23 82   02/10/23 74     Wt Readings from Last 5 Encounters:   08/21/24 87.5 kg (193 lb)   06/03/24 90.3 kg (199 lb)   03/05/24 82.7 kg (182 lb 6.4 oz)   09/07/23 95.1 kg (209 lb 9.6 oz)   05/25/23 95.7 kg (211 lb)     BP Readings from Last 5 Encounters:   06/03/24 120/54   03/05/24 132/84   09/07/23 124/76   05/25/23 128/72   02/10/23 112/68                 Mental Status Exam  Alertness: alert  and oriented  Appearance: adequately groomed  Behavior/Demeanor: cooperative, pleasant, and calm, with good eye contact   Speech: normal and regular rate and rhythm  Language: intact and no problems  Psychomotor: normal or unremarkable  Mood: depressed and anxious  Affect: restricted; was congruent to mood  Thought Process/Associations: unremarkable  Thought Content:  Reports none;  Denies suicidal ideation, violent ideation, and delusions  Perception:  Reports none;  Denies auditory hallucinations and visual hallucinations  Insight: intact  Judgment: adequate for safety and intact  Cognition: does  appear grossly intact; formal cognitive testing was not done  oriented: time, person, and place  Gait and Station:  N/A - telehealth                Data       8/20/2024     8:23 PM 12/31/2024     7:18 AM   PROMIS-10 Total Score w/o Sub Scores   PROMIS TOTAL - SUBSCORES 28    28 27          8/20/2024     8:23 PM   CAGE-AID Total Score   Total Score 0    0   Total Score MyChart 0 (A total score of 2 or greater is considered clinically significant)         3/5/2024     6:54 AM 8/20/2024     8:07 PM 9/24/2024     1:33 PM   PHQ   PHQ-9 Total Score 3 2    2 12   Q9: Thoughts of better off dead/self-harm past 2 weeks Not at all Not at all Not at all         1/23/2019    11:43 AM 8/30/2019     3:47 PM 9/24/2024     1:34 PM   GURMEET-7 SCORE   Total Score  2 (minimal anxiety) 6 (mild anxiety)   Total Score 7 2 6       Liver/kidney function Metabolic Blood counts   Recent Labs   Lab Test 10/19/24  1429 04/30/18  1553   CR 1.17 1.15*   * 27   ALT 46 33   ALKPHOS 80 131    Recent Labs   Lab Test 10/19/24  1429 06/13/24  1020 09/07/23  0715   CHOL  --  158  --    TRIG  --  29  --    LDL  --  83  --    HDL  --  69  --    A1C  --   --  5.1   TSH 1.28  --  2.00    Recent Labs   Lab Test 12/09/23  1506   WBC 5.4   HGB 15.7   HCT 44.3   MCV 86           ECG results from 10/21/24   EKG 12-lead, tracing only     Value    Systolic Blood Pressure     Diastolic Blood Pressure     Ventricular Rate 73    Atrial Rate 73    IL Interval 142    QRS Duration 110        QTc 429    P Axis 61    R AXIS 83    T Axis 60    Interpretation ECG      Sinus rhythm with sinus arrhythmia  Normal ECG    Confirmed by MD TROY, AIDA ([id number]) on 10/22/2024 5:20:24 PM         Administrative Billing:     Level of Medical Decision Making:   - At least 1 chronic problem that is not stable  - Engaged in prescription drug management during visit (discussed any medication benefits, side effects, alternatives, etc.)    The longitudinal plan of care for the diagnosis(es)/condition(s) as documented above were addressed during this visit. Due to the added complexity in care, I will continue to support Wilfrido in the subsequent management and with ongoing continuity of care.

## 2024-12-31 ENCOUNTER — VIRTUAL VISIT (OUTPATIENT)
Dept: BEHAVIORAL HEALTH | Facility: CLINIC | Age: 24
End: 2024-12-31
Payer: COMMERCIAL

## 2024-12-31 ENCOUNTER — VIRTUAL VISIT (OUTPATIENT)
Dept: PSYCHIATRY | Facility: CLINIC | Age: 24
End: 2024-12-31
Payer: COMMERCIAL

## 2024-12-31 DIAGNOSIS — F90.9 ATTENTION DEFICIT HYPERACTIVITY DISORDER (ADHD), UNSPECIFIED ADHD TYPE: ICD-10-CM

## 2024-12-31 DIAGNOSIS — F41.1 GAD (GENERALIZED ANXIETY DISORDER): ICD-10-CM

## 2024-12-31 DIAGNOSIS — F90.2 ATTENTION DEFICIT HYPERACTIVITY DISORDER (ADHD), COMBINED TYPE: Primary | ICD-10-CM

## 2024-12-31 DIAGNOSIS — F33.1 MDD (MAJOR DEPRESSIVE DISORDER), RECURRENT EPISODE, MODERATE (H): ICD-10-CM

## 2024-12-31 PROCEDURE — 90832 PSYTX W PT 30 MINUTES: CPT | Mod: 95 | Performed by: SOCIAL WORKER

## 2024-12-31 RX ORDER — GUANFACINE 1 MG/1
1 TABLET, EXTENDED RELEASE ORAL AT BEDTIME
Qty: 7 TABLET | Refills: 0 | Status: SHIPPED | OUTPATIENT
Start: 2024-12-31 | End: 2025-01-07

## 2024-12-31 RX ORDER — DEXTROAMPHETAMINE SACCHARATE, AMPHETAMINE ASPARTATE, DEXTROAMPHETAMINE SULFATE AND AMPHETAMINE SULFATE 5; 5; 5; 5 MG/1; MG/1; MG/1; MG/1
20 TABLET ORAL EVERY MORNING
Qty: 30 TABLET | Refills: 0 | Status: SHIPPED | OUTPATIENT
Start: 2025-01-06

## 2024-12-31 RX ORDER — DEXTROAMPHETAMINE SACCHARATE, AMPHETAMINE ASPARTATE, DEXTROAMPHETAMINE SULFATE AND AMPHETAMINE SULFATE 2.5; 2.5; 2.5; 2.5 MG/1; MG/1; MG/1; MG/1
10 TABLET ORAL DAILY
Qty: 30 TABLET | Refills: 0 | Status: SHIPPED | OUTPATIENT
Start: 2025-01-06

## 2024-12-31 RX ORDER — GUANFACINE 2 MG/1
2 TABLET, EXTENDED RELEASE ORAL AT BEDTIME
Qty: 30 TABLET | Refills: 1 | Status: SHIPPED | OUTPATIENT
Start: 2025-01-07

## 2024-12-31 ASSESSMENT — PAIN SCALES - GENERAL: PAINLEVEL_OUTOF10: MILD PAIN (3)

## 2024-12-31 NOTE — NURSING NOTE
Current patient location:  work    Is the patient currently in the state [facility]? YES    Visit mode:VIDEO    If the visit is dropped, the patient can be reconnected by:VIDEO VISIT: Text to cell phone:   Telephone Information:   Mobile [phone]       Will anyone else be joining the visit? NO  (If patient encounters technical issues they should call [phone])    Are changes needed to the allergy or medication list? Pt stated no changes to allergies and Pt stated no med changes    Are refills needed on medications prescribed by this physician? YES    Rooming Documentation:  Questionnaire(s) completed    Reason for visit: JAISON MILLER

## 2024-12-31 NOTE — PATIENT INSTRUCTIONS
Medication Plan  -Restart guanfacine ER 1mg at bedtime for 7 days, THEN increase back to 2mg.   -Continue Adderall IR 20mg QAM + Adderall IR 10mg Qafternoon    **For crisis resources, please see the information at the end of this document**   Patient Education    Thank you for coming to the [facility] [place] [facility].     Lab Testing:  If you had lab testing today and your results are reassuring or normal they will be mailed to you or sent through [employer] within 7 days. If the lab tests need quick action we will call you with the results. The phone number we will call with results is # [phone]. If this is not the best number please call our clinic and change the number.     Medication Refills:  If you need any refills please call your pharmacy and they will contact us.   Three business days of notice are needed for general medication refill requests.   Five business days of notice are needed for controlled substance refill requests.   If you need to change to a different pharmacy, please contact the new pharmacy directly. The new pharmacy will help you get your medications transferred.     Contact Us:  Please call [phone] during business hours (8-5:00 M-F).     Financial Assistance [phone]   Medical Records [phone]       MENTAL HEALTH CRISIS RESOURCES:  For a emergency help, please call 911 or go to the nearest Emergency Department.     Emergency Walk-In Options:   EmPATH Unit @ [facility] ([place]): [phone] - Specialized mental health emergency area designed to be calming  [facility] ([place]): [phone]  [facility] [place] [facility] ([place]): [phone]  [facility]): [phone]    [facility] Crisis Information:   [place]: [phone]  [place]: [phone]  [place] ([place]) - Adult: [phone]     Child: [phone]  [place] - Adult: [phone]     Child: [phone]  [place]:  [phone]  List of all [facility] resources:   https://[place].gov/dhs/people-we-serve/adults/health-care/mental-health/resources/crisis-contacts.jsp    National Crisis Information:   Crisis Text Line: Text  [place]  to 881491  Suicide & Crisis Lifeline: 988  National Suicide Prevention Lifeline: 4-808-480-TALK (1-149.812.6214)       For online chat options, visit https://suicidepreventionlifeline.org/chat/  Poison Control Center: 9-904-963-7037  Trans Lifeline: 1-609-082-8607 - Hotline for transgender people of all ages  The [employer] Project: 2-906-878-2304 - Hotline for LGBT youth     For Non-Emergency Support:   Fast Tracker: Mental Health & Substance Use Disorder Resources -   https://www.[employer].org/

## 2024-12-31 NOTE — PROGRESS NOTES
Virtual Visit Details    Type of service:  Video Visit     Originating Location (pt. Location): Work    Distant Location (provider location):  Off-site  Platform used for Video Visit: Jossie

## 2025-01-13 ENCOUNTER — MYC REFILL (OUTPATIENT)
Dept: PSYCHIATRY | Facility: CLINIC | Age: 25
End: 2025-01-13
Payer: COMMERCIAL

## 2025-01-13 DIAGNOSIS — F90.9 ATTENTION DEFICIT HYPERACTIVITY DISORDER (ADHD), UNSPECIFIED ADHD TYPE: ICD-10-CM

## 2025-01-14 RX ORDER — DEXTROAMPHETAMINE SACCHARATE, AMPHETAMINE ASPARTATE, DEXTROAMPHETAMINE SULFATE AND AMPHETAMINE SULFATE 2.5; 2.5; 2.5; 2.5 MG/1; MG/1; MG/1; MG/1
10 TABLET ORAL DAILY
Qty: 30 TABLET | Refills: 0 | OUTPATIENT
Start: 2025-01-14

## 2025-01-14 RX ORDER — DEXTROAMPHETAMINE SACCHARATE, AMPHETAMINE ASPARTATE, DEXTROAMPHETAMINE SULFATE AND AMPHETAMINE SULFATE 5; 5; 5; 5 MG/1; MG/1; MG/1; MG/1
20 TABLET ORAL EVERY MORNING
Qty: 30 TABLET | Refills: 0 | OUTPATIENT
Start: 2025-01-14

## 2025-01-14 NOTE — TELEPHONE ENCOUNTER
1) Reviewed refill request(s) from [employer]     2) Any Controlled Substance(s)? Yes   [place]  checked? N/A    3) Refill(s) requested for:     - amphetamine-dextroamphetamine (ADDERALL) 10 MG tablet  Date last ordered: 1/6/25 Qty: 30 Refills: 0   pharmacy should have refill(s) on file      - amphetamine-dextroamphetamine (ADDERALL) 20 MG tablet  Date last ordered: 1/6/5 Qty: 30 Refills: 0   pharmacy should have refill(s) on file      4) Action taken: contacted patient via [employer]   .    GONZALO JO RN on 1/14/2025 at 9:20 AM

## 2025-01-16 ENCOUNTER — MYC MEDICAL ADVICE (OUTPATIENT)
Dept: PEDIATRICS | Facility: CLINIC | Age: 25
End: 2025-01-16
Payer: COMMERCIAL

## 2025-01-16 ENCOUNTER — TELEPHONE (OUTPATIENT)
Dept: PSYCHIATRY | Facility: CLINIC | Age: 25
End: 2025-01-16
Payer: COMMERCIAL

## 2025-01-16 NOTE — TELEPHONE ENCOUNTER
RN called [facility] and confirmed prior auth needed for BOTH doses of adderall (20mg QAM and 10mg Q Afternoon).    RN responded to patient's wiMAN message notifying him that a PA is needed for both doses of adderall and that this can take up to 2 weeks to get a determination from insurance.     New encounter for PA routed to PA team.     Ernestina SMITH RN  [facility]

## 2025-01-16 NOTE — TELEPHONE ENCOUNTER
Prior Authorization Retail Medication Request    Medication/Dose: Adderall (10mg and 20mg)      amphetamine-dextroamphetamine (ADDERALL) 20 MG tablet Take 1 tablet (20 mg) by mouth every morning. 30 tablet 0 ordered 01/06/2025 --     amphetamine-dextroamphetamine (ADDERALL) 10 MG tablet Take 1 tablet (10 mg) by mouth daily. In the afternoon 30 tablet 0 ordered 01/06/2025       Preferred routing pool for dept communication:   [facility] Nurse Pool-Primary Care     Ernestina SMITH RN  [facility]

## 2025-01-20 NOTE — TELEPHONE ENCOUNTER
PA Initiation    Medication: AMPHETAMINE-DEXTROAMPHETAMINE 10 MG PO TABS  Insurance Company: [employer] - Phone [phone] Fax [phone]  Pharmacy Filling the Rx: [id number] - [place], [place] - [address] [place] [address]  Filling Pharmacy Phone: [phone]  Filling Pharmacy Fax: [phone]  Start Date: 1/20/2025

## 2025-01-22 NOTE — TELEPHONE ENCOUNTER
Prior Authorization Approval    Medication: AMPHETAMINE-DEXTROAMPHETAMINE 10 MG PO TABS  Authorization Effective Date: 12/21/2024  Authorization Expiration Date: 1/20/2026  Approved Dose/Quantity:       Reference #:     Insurance Company: [employer] [phone] Fax [phone]  Expected CoPay: $    CoPay Card Available:      Financial Assistance Needed:   Which Pharmacy is filling the prescription: [id number] - [place] - [address]  Pharmacy Notified: YES  Patient Notified: Instructed pharmacy to notify patient once order is ready.

## 2025-01-24 ENCOUNTER — TELEPHONE (OUTPATIENT)
Dept: PEDIATRICS | Facility: CLINIC | Age: 25
End: 2025-01-24
Payer: COMMERCIAL

## 2025-01-24 NOTE — TELEPHONE ENCOUNTER
Reason for Call:  Appointment Request    Patient requesting this type of appt:      EKG 12-lead complete with read       Requested provider: RN Visit    Reason patient unable to be scheduled: Needs to be scheduled by clinic    When does patient want to be seen/preferred time: 1-2 weeks    Comments: No    Could we send this information to you in [employer] or would you prefer to receive a phone call?:   Patient would like to be contacted via [employer]    Call taken on 1/24/2025 at 12:01 PM by Lluvia Blount

## 2025-01-25 ENCOUNTER — LAB (OUTPATIENT)
Dept: LAB | Facility: CLINIC | Age: 25
End: 2025-01-25
Payer: COMMERCIAL

## 2025-01-25 DIAGNOSIS — Z79.899 ENCOUNTER FOR LONG-TERM (CURRENT) USE OF MEDICATIONS: ICD-10-CM

## 2025-01-25 PROCEDURE — 84450 TRANSFERASE (AST) (SGOT): CPT

## 2025-01-25 PROCEDURE — 84460 ALANINE AMINO (ALT) (SGPT): CPT

## 2025-01-25 PROCEDURE — 36415 COLL VENOUS BLD VENIPUNCTURE: CPT

## 2025-01-26 LAB
ALT SERPL W P-5'-P-CCNC: 32 U/L (ref 0–70)
AST SERPL W P-5'-P-CCNC: 32 U/L (ref 0–45)

## 2025-02-03 ENCOUNTER — PATIENT OUTREACH (OUTPATIENT)
Dept: CARE COORDINATION | Facility: CLINIC | Age: 25
End: 2025-02-03
Payer: COMMERCIAL

## 2025-02-04 NOTE — PROGRESS NOTES
[facility]    Behavioral Health Clinician Progress Note   Mental Health & Addiction Services      Feb 5 2025    Patient Name: Wilfrido Echavarria       Service Type:  Individual   Service Location:  [facility] / Email (patient reached)   Visit Start Time: 1058 AM  Visit End Time:  1115 AM   Session Length: 16 - 37    Attendees: Patient   Service Modality: Video Visit:      Provider verified identity through the following two step process.  Patient provided:  Patient photo and Patient is known previously to provider    Telemedicine Visit: The patient's condition can be safely assessed and treated via synchronous audio and visual telemedicine encounter.      Reason for Telemedicine Visit: Patient convenience (e.g. access to timely appointments / distance to available provider)    Originating Site (Patient Location): Patient's home    Distant Site (Provider Location): Provider Remote Setting- Home Office    Consent:  The patient/guardian has verbally consented to: the potential risks and benefits of telemedicine (video visit) versus in person care; bill my insurance or make self-payment for services provided; and responsibility for payment of non-covered services.     Patient would like the video invitation sent by:  My Chart    Mode of Communication:  Video Conference via [facility]    Distant Location (Provider):  Off-site    As the provider I attest to compliance with applicable laws and regulations related to telemedicine.   Visit number: 4    [facility] Visit Activities (Refresh list every visit): [facility] Only     Diagnostic Assessment Date: 08/21/2024   Treatment Plan Review Date: 02/12/2025      DATA:    Extended Session (60+ minutes): No   Interactive Complexity: No   Crisis: No   [facility] Patient: No     Assessments completed prior to visit:   PHQ2:       12/31/2024     7:17 AM 9/24/2024     1:32 PM 3/5/2024     8:15 AM   PHQ-2 ( 2000 [employer])   Q1: Little interest or pleasure in  doing things 1 2 0   Q2: Feeling down, depressed or hopeless 1 0 0   PHQ-2 Score 2  2 0   Q1: Little interest or pleasure in doing things Several days More than half the days    Q2: Feeling down, depressed or hopeless Several days Not at all    PHQ-2 Score 2 2        Patient-reported     PHQ9:       11/6/2019    11:35 AM 10/20/2022    10:40 AM 2/10/2023     9:05 AM 9/7/2023     5:11 AM 3/5/2024     6:54 AM 8/20/2024     8:07 PM 9/24/2024     1:33 PM   PHQ-9 SCORE   PHQ-9 Total Score MyChart  12 (Moderate depression) 0 0 3 (Minimal depression) 2 (Minimal depression) 12 (Moderate depression)   PHQ-9 Total Score 0 12 0 0 3 2    2 12     GAD2:       8/20/2024     8:22 PM 12/31/2024     7:17 AM   GURMEET-2   Feeling nervous, anxious, or on edge 1 1   Not being able to stop or control worrying 0 1   GURMEET-2 Total Score 1    1 2        Patient-reported     GAD7:       5/30/2018     4:25 PM 7/12/2018     3:51 PM 9/25/2018     5:15 PM 11/21/2018     1:28 PM 1/23/2019    11:43 AM 8/30/2019     3:47 PM 9/24/2024     1:34 PM   GURMEET-7 SCORE   Total Score      2 (minimal anxiety)  6 (mild anxiety)   Total Score 2 3 8 6 7 2 6       Proxy-reported     PROMIS 10-Global Health (all questions and answers displayed):       8/20/2024     8:23 PM 12/31/2024     7:18 AM   PROMIS 10   In general, would you say your health is: Excellent Good   In general, would you say your quality of life is: Very good Very good   In general, how would you rate your physical health? Excellent Excellent   In general, how would you rate your mental health, including your mood and your ability to think? Good Good   In general, how would you rate your satisfaction with your social activities and relationships? Fair Fair   In general, please rate how well you carry out your usual social activities and roles Good Good   To what extent are you able to carry out your everyday physical activities such as walking, climbing stairs, carrying groceries, or moving a chair?  Completely Mostly   In the past 7 days, how often have you been bothered by emotional problems such as feeling anxious, depressed, or irritable? Sometimes Often   In the past 7 days, how would you rate your fatigue on average? Moderate Moderate   In the past 7 days, how would you rate your pain on average, where 0 means no pain, and 10 means worst imaginable pain? 5 2   In general, would you say your health is: 5 3   In general, would you say your quality of life is: 4 4   In general, how would you rate your physical health? 5 5   In general, how would you rate your mental health, including your mood and your ability to think? 3 3   In general, how would you rate your satisfaction with your social activities and relationships? 2 2   In general, please rate how well you carry out your usual social activities and roles. (This includes activities at home, at work and in your community, and responsibilities as a parent, child, spouse, employee, friend, etc.) 3 3   To what extent are you able to carry out your everyday physical activities such as walking, climbing stairs, carrying groceries, or moving a chair? 5 4   In the past 7 days, how often have you been bothered by emotional problems such as feeling anxious, depressed, or irritable? 3 4   In the past 7 days, how would you rate your fatigue on average? 3 3   In the past 7 days, how would you rate your pain on average, where 0 means no pain, and 10 means worst imaginable pain? 5 2   Global Mental Health Score 12    12 11   Global Physical Health Score 16    16 16   PROMIS TOTAL - SUBSCORES 28    28 27     PROMIS 10-Global Health (only subscores and total score):       8/20/2024     8:23 PM 12/31/2024     7:18 AM   PROMIS-10 Scores Only   Global Mental Health Score 12    12 11   Global Physical Health Score 16    16 16   PROMIS TOTAL - SUBSCORES 28    28 27       Reason for Visit/Presenting Concern:  ADHD    Current Stressors / Issues:    update: He ran out of his  guanfacine and initially felt manic. Since restarting it he feels more in control, over-thinking less, less impulsive and has improved attention but struggles to focus beyond 15 or 20 minutes. [facility] suggested he used an alarm to manage his time. Receptive. [facility] provided psycho-education on how executive function is different in people who have ADHD. He reports that he's more able to recognize and manage his emotions. He's being more open with his fiance about his emotions etc. He will be starting a certificate program soon. 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 He did get a recommendation for a therapist from a co-worker and may pursue that as his finances improve  LES: No  Preg: NA  Most important: medication update, forgetfulness        Therapeutic Interventions:  Psycho-education: Provided psycho-education about patient's behavioral health condition and symptoms.    Response to treatment interventions:   Patient was receptive to interventions utilized.  Patient was engaged in the therapy process.       Progress on Treatment Objective(s) / Homework:   Satisfactory progress - ACTION (Actively working towards change); Intervened by reinforcing change plan / affirming steps taken     Medication Review:   No changes to current psychiatric medication(s)     Medication Compliance:   Yes     Chemical Use Review:  Substance Use: Chemical use reviewed, no active concerns identified      Tobacco Use: No current tobacco use.       Assessment: Current Emotional / Mental Status (status of significant symptoms):    Risk status (Self / Other harm or suicidal ideation)   Patient denies a history of suicidal ideation, suicide attempts, self-injurious behavior, homicidal ideation, homicidal  behavior, and and other safety concerns   Patient denies current fears or concerns for personal safety.   Patient denies current or recent suicidal ideation or behaviors.   Patient denies current or recent homicidal ideation or behaviors.   Patient denies current or recent self injurious behavior or ideation.   Patient denies other safety concerns.   Recommended that patient call 911 or go to the local ED should there be a change in any of these risk factors      ASSESSMENT:   Mental Status:     Appearance:   Appropriate    Eye Contact:   Good    Psychomotor Behavior: Normal    Attitude:   Cooperative    Orientation:   All   Speech Rate / Production: Normal    Volume:   Normal    Mood:    Normal   Affect:    Appropriate    Thought Content:  Clear    Thought Form:  Coherent  Logical    Insight:    Good          Diagnoses:   Data Unavailable    Collateral Reports Completed:   Collaborated with [facility] team        Plan: (Homework, other):   Patient was provided No indications of CD issues  Patient was given information about behavioral services and encouraged to schedule a follow up appointment with the clinic [facility] in 1 month.         JERMAINE Watson, [facility]     _____________________________________________________________________________________________________________________________________                                              Individual Treatment Plan    Patient's Name: Wilfrido Echavarria   YOB: 2000  Date of Creation: 11/12/2024  Date Treatment Plan Last Reviewed/Revised: pending    DSM5 Diagnoses: Attention-Deficit/Hyperactivity Disorder  314.01 (F90.2) Combined presentation  Psychosocial / Contextual Factors: Occupational Issues  PROMIS (reviewed every 90 days):   The following assessments were completed by patient for this visit:  PROMIS 10-Global Health (all questions and answers displayed):       8/20/2024     8:23 PM 12/31/2024     7:18 AM   PROMIS 10   In general, would you say your  health is: Excellent Good   In general, would you say your quality of life is: Very good Very good   In general, how would you rate your physical health? Excellent Excellent   In general, how would you rate your mental health, including your mood and your ability to think? Good Good   In general, how would you rate your satisfaction with your social activities and relationships? Fair Fair   In general, please rate how well you carry out your usual social activities and roles Good Good   To what extent are you able to carry out your everyday physical activities such as walking, climbing stairs, carrying groceries, or moving a chair? Completely Mostly   In the past 7 days, how often have you been bothered by emotional problems such as feeling anxious, depressed, or irritable? Sometimes Often   In the past 7 days, how would you rate your fatigue on average? Moderate Moderate   In the past 7 days, how would you rate your pain on average, where 0 means no pain, and 10 means worst imaginable pain? 5 2   In general, would you say your health is: 5 3   In general, would you say your quality of life is: 4 4   In general, how would you rate your physical health? 5 5   In general, how would you rate your mental health, including your mood and your ability to think? 3 3   In general, how would you rate your satisfaction with your social activities and relationships? 2 2   In general, please rate how well you carry out your usual social activities and roles. (This includes activities at home, at work and in your community, and responsibilities as a parent, child, spouse, employee, friend, etc.) 3 3   To what extent are you able to carry out your everyday physical activities such as walking, climbing stairs, carrying groceries, or moving a chair? 5 4   In the past 7 days, how often have you been bothered by emotional problems such as feeling anxious, depressed, or irritable? 3 4   In the past 7 days, how would you rate your  "fatigue on average? 3 3   In the past 7 days, how would you rate your pain on average, where 0 means no pain, and 10 means worst imaginable pain? 5 2   Global Mental Health Score 12    12 11   Global Physical Health Score 16    16 16   PROMIS TOTAL - SUBSCORES 28    28 27     PROMIS 10-Global Health (only subscores and total score):       8/20/2024     8:23 PM 12/31/2024     7:18 AM   PROMIS-10 Scores Only   Global Mental Health Score 12    12 11   Global Physical Health Score 16    16 16   PROMIS TOTAL - SUBSCORES 28    28 27        Referral / Collaboration:  Collaborated with [facility] team .    Anticipated number of session for this episode of care:  6-9 sessions  Anticipation frequency of session: Monthly  Anticipated Duration of each session: 16-37 minutes  Treatment plan will be reviewed in 90 days or when goals have been changed.     [facility] will provide psycho-education on skills to manage ADHD including:  Goal Setting  Externalizing Cues (notes, calendars,alarms, white board)  Use of Planner  Environmental Modification (wear headphones to manage sensory overload or having an escape plan to remove self from overwhelming situations, use of headphones to assist with studying e.g)  Breaking Task to Smaller Parts .    MeasurableTreatment Goal(s) related to diagnosis / functional impairment(s)  Goal 1: Patient will  experience improved attention   \"I will know I've met my goal when if I'm able to focus on on thing at a time.\"     Objective #A (Patient Action)  Patient will  experience improved attention  Status: New - Date: 11/12/2024      Intervention(s)  [facility] will Psycho-education: Provide psycho-education about patient's behavioral health condition and symptoms.       Patient has reviewed and agreed to the above plan.     Written by  Heavenly Flores [facility], [facility]       "

## 2025-02-05 ENCOUNTER — VIRTUAL VISIT (OUTPATIENT)
Dept: PSYCHIATRY | Facility: CLINIC | Age: 25
End: 2025-02-05
Payer: COMMERCIAL

## 2025-02-05 ENCOUNTER — VIRTUAL VISIT (OUTPATIENT)
Dept: BEHAVIORAL HEALTH | Facility: CLINIC | Age: 25
End: 2025-02-05
Payer: COMMERCIAL

## 2025-02-05 DIAGNOSIS — F90.2 ATTENTION DEFICIT HYPERACTIVITY DISORDER (ADHD), COMBINED TYPE: Primary | ICD-10-CM

## 2025-02-05 DIAGNOSIS — F33.1 MDD (MAJOR DEPRESSIVE DISORDER), RECURRENT EPISODE, MODERATE (H): ICD-10-CM

## 2025-02-05 DIAGNOSIS — F90.9 ATTENTION DEFICIT HYPERACTIVITY DISORDER (ADHD), UNSPECIFIED ADHD TYPE: ICD-10-CM

## 2025-02-05 DIAGNOSIS — F41.1 GAD (GENERALIZED ANXIETY DISORDER): ICD-10-CM

## 2025-02-05 PROCEDURE — 90832 PSYTX W PT 30 MINUTES: CPT | Mod: 95 | Performed by: SOCIAL WORKER

## 2025-02-05 RX ORDER — DEXTROAMPHETAMINE SACCHARATE, AMPHETAMINE ASPARTATE, DEXTROAMPHETAMINE SULFATE AND AMPHETAMINE SULFATE 2.5; 2.5; 2.5; 2.5 MG/1; MG/1; MG/1; MG/1
5 TABLET ORAL 2 TIMES DAILY
Qty: 30 TABLET | Refills: 0 | Status: SHIPPED | OUTPATIENT
Start: 2025-02-05

## 2025-02-05 RX ORDER — DEXTROAMPHETAMINE SACCHARATE, AMPHETAMINE ASPARTATE MONOHYDRATE, DEXTROAMPHETAMINE SULFATE AND AMPHETAMINE SULFATE 5; 5; 5; 5 MG/1; MG/1; MG/1; MG/1
20 CAPSULE, EXTENDED RELEASE ORAL EVERY MORNING
Qty: 30 CAPSULE | Refills: 0 | Status: SHIPPED | OUTPATIENT
Start: 2025-02-05

## 2025-02-05 RX ORDER — GUANFACINE 3 MG/1
3 TABLET, EXTENDED RELEASE ORAL AT BEDTIME
Qty: 30 TABLET | Refills: 1 | Status: SHIPPED | OUTPATIENT
Start: 2025-02-05

## 2025-02-05 ASSESSMENT — PATIENT HEALTH QUESTIONNAIRE - PHQ9
SUM OF ALL RESPONSES TO PHQ QUESTIONS 1-9: 5
SUM OF ALL RESPONSES TO PHQ QUESTIONS 1-9: 5
10. IF YOU CHECKED OFF ANY PROBLEMS, HOW DIFFICULT HAVE THESE PROBLEMS MADE IT FOR YOU TO DO YOUR WORK, TAKE CARE OF THINGS AT HOME, OR GET ALONG WITH OTHER PEOPLE: SOMEWHAT DIFFICULT

## 2025-02-05 ASSESSMENT — ANXIETY QUESTIONNAIRES
5. BEING SO RESTLESS THAT IT IS HARD TO SIT STILL: NOT AT ALL
GAD7 TOTAL SCORE: 5
GAD7 TOTAL SCORE: 5
7. FEELING AFRAID AS IF SOMETHING AWFUL MIGHT HAPPEN: NOT AT ALL
1. FEELING NERVOUS, ANXIOUS, OR ON EDGE: SEVERAL DAYS
IF YOU CHECKED OFF ANY PROBLEMS ON THIS QUESTIONNAIRE, HOW DIFFICULT HAVE THESE PROBLEMS MADE IT FOR YOU TO DO YOUR WORK, TAKE CARE OF THINGS AT HOME, OR GET ALONG WITH OTHER PEOPLE: SOMEWHAT DIFFICULT
2. NOT BEING ABLE TO STOP OR CONTROL WORRYING: SEVERAL DAYS
3. WORRYING TOO MUCH ABOUT DIFFERENT THINGS: SEVERAL DAYS
8. IF YOU CHECKED OFF ANY PROBLEMS, HOW DIFFICULT HAVE THESE MADE IT FOR YOU TO DO YOUR WORK, TAKE CARE OF THINGS AT HOME, OR GET ALONG WITH OTHER PEOPLE?: SOMEWHAT DIFFICULT
GAD7 TOTAL SCORE: 5
6. BECOMING EASILY ANNOYED OR IRRITABLE: SEVERAL DAYS
7. FEELING AFRAID AS IF SOMETHING AWFUL MIGHT HAPPEN: NOT AT ALL
4. TROUBLE RELAXING: SEVERAL DAYS

## 2025-02-05 ASSESSMENT — PAIN SCALES - GENERAL: PAINLEVEL_OUTOF10: NO PAIN (0)

## 2025-02-05 NOTE — NURSING NOTE
Is the patient currently in the state of [place]? YES    Current patient location: Patient declined to provide     Visit mode:Video    If the visit is dropped, the patient can be reconnected by: VIDEO VISIT:  Send e-mail to at [email or web address]    Will anyone else be joining the visit? No  (If patient encounters technical issues they should call [phone])    Are changes needed to the allergy or medication list? No    Are refills needed on medications prescribed by this physician? No    Rooming Documentation: Questionnaire(s) completed.    Reason for visit: PAUL Oneil

## 2025-02-05 NOTE — PROGRESS NOTES
[facility]  Collaborative Care Psychiatry Service ([facility])  MEDICAL PROGRESS NOTE     Wilfrido Echavarria is a 24 year old adult who prefers the name Wilfrido.     Initial consultation on 08/21/24.      CARE TEAM:   PCP- Yudith Retana  Therapist- None              Assessment & Plan     History and interview support the following diagnoses:   MDD  GURMEET  ADHD    Wilfrido is a 24-year-old adult with history of depression, anxiety, and ADHD who presents for psychiatric follow-up with [facility].     Wilfrido was last seen 12/31/24 at which time guanfacine was restarted to 2mg at bedtime after running out of medication for 1-2 weeks. Today, patient reports improvement in mood symptoms since restarting guanfacine. He reports good benefit from guanfacine and denies side effects. He will be starting an accelerated academic program within the next few weeks and foresees himself needing to study late into the evening. We discussed stimulant dosing options as his current regimen is immediate release Adderall. We will plan to trial Adderall XR 20mg QAM + IR 5mg BID PRN to allow for longer coverage and flexibility of dosing during the day - no change in total daily dose of 30mg. Given good benefit and tolerability of guanfacine, we will also plan to increase dose to 3mg nightly. Wilfrido denies cardiovascular concerns including tachycardia, dizziness, or palpitations. He denies SI/NSSI/HI. He will message clinic in 4 weeks with an update on medication changes.     Of note, we did discuss recent mild elevation in AST which could be due to strenuous exercise that Wilfrido takes part in regularly; he denies symptoms of hepatotoxicity including brown urine, yellowing of the eyes, unexplained weight loss/fatigue. Will repeat labs in ~1 month ([facility] reminder sent today, 12/31/24). EKG was completed 10/2024 and was unremarkable per cardiology read. AST and ALT repeated in  January 2025; AST now normalized.     Assessment from initial consultation on 08/21/24:  Patient reports several different concerns today. He reports history of increased anger and difficulty controlling his emotions. He has no history of physical aggression or violence towards others however at times he is verbally reactive and says things he later on regrets. This has impacted his relationships and his quality of life. He doesn't feel that depression is a current concern. Anxiety, impulsivity, and feeling tense and always on edge tend to be the symptoms that are most disruptive to his life. He does carry a diagnosis of ADHD and has only ever been on amphetamine based stimulants. He does feel that Adderall helps him fidget less but continues to have a hard time with focus and staying on track, especially when reading or attempting to study.    Patient's history is significant for notable trauma. He endorses regular trauma-related nightmares that can disrupt sleep at times. In general he feels that he is getting enough sleep since implemented a consistent sleep schedule. He carries genetic loading for bipolar disorder however based on history and assessment, he does not present with symptoms consistent with hypo/ade.     Patient reports symptoms of periodic tachycardia at rest based on alerts he receives from his watch. He also notes periodic orthostasis. Given these symptoms, I've asked him to complete an updated EKG through his PCP before we change his medication regimen. If symptoms of tachycardia persist or worsen, he was instructed to hold his Adderall dose and seek urgent medical care.     Future considerations:  -We discussed use of clonidine for behavioral dysregulation related to autonomic overactivation which may have some benefit for nightmares and concentration. Prone to orthostasis, must be titrated slowly. Once he repeats an EKG we will first focus on starting this medication as it will help target  anxiety/overactivation/trauma nightmares/concentration. He also reports a history of tics, worsened in the setting of anxiety/started before stimulant use, which clonidine may also help treat.   -Switching from Adderall to methylphenidate which may somewhat reduce the risk of HTN and tachycardia. May also contribute less to agitation/overactivation.  -Consider use of selective serotonin reuptake inhibitor (Lexapro could be a good option) to target anxiety/mood symptoms.     Psychotherapy discussion: Primary recommendation for the treatment of mood symptoms, especially anxiety. Supportive therapy can be useful for reducing the impact of acute or chronic psychosocial stressors. CBT can be particularly helpful for ruminative thinking and addressing maladaptive coping mechanisms that may worsen anxiety.     PSYCHOTROPIC DRUG INTERACTIONS: **Italicized interactions are for treatment plan options not currently implemented**  none    MANAGEMENT:  N/A    [place]MP was checked today: Indicates controlled medications refilled as prescribed              Plan    1) PSYCHOTROPIC MEDICATION RECOMMENDATIONS:  -Increase guanfacine ER to 0.3mg nightly  -Change Adderall to XR 20mg QAM and IR 5mg BID for extended daytime coverage (same TDD of 30mg as previous IR regimen)    2) THERAPY: None    3) NEXT DUE:   Labs- AST elevated slightly (107) on 10/19/24. All other hepatic labs WNL. AST normalized on repeat testing 01/25/25.   ECG- EKG completed 10/21/24 and WNL per cardiology review.    4) REFERRALS / COORDINATION: None    5) DISPOSITION:   - Pt would benefit from brief psychiatric intervention (up to ~5 visits) to adjust meds and gauge for benefit.   - Follow up with SELMA Centeno CNP in 8 weeks. Plan to transition med management back to designated prescriber after brief care is complete.   - If not seen for 6 months, follow up and prescribing will automatically revert back to referring provider / PCP.     Treatment Risk Statement:   "The patient understands the risks, benefits, adverse effects and alternatives. Agrees to treatment with the capacity to do so. No medical contraindications to treatment. Agrees to contact provider for any problems. The patient understands to call 911 or go to the nearest ED if urgent or life threatening symptoms occur. Crisis contact numbers are provided routinely in the After Visit Summary.    Suicide Risk Assessment: Wilfrido did not appear to be an imminent safety risk to self or others.    PROVIDER:  SELMA Centeno CNP    The [facility] psychiatry providers act as a specialty service for Primary Care Providers in the [facility] who seek to optimize medications for unstable patients. Once medications have been optimized, [facility] providers discharge the patient back to the referring Primary Care Provider for ongoing medication management. Care team has reviewed attendance agreement with patient. Patient advised that two failed appointments within 6 months may lead to termination of current episode of care.     Patient Status:  Patient will continue to be seen for ongoing consultation and stabilization.              Interim History    Per [facility] Heavenly Flores during today's team-based visit: \"He ran out of his guanfacine and initially felt manic. Since restarting it he feels more in control, over-thinking less, less impulsive and has improved attention but struggles to focus beyond 15 or 20 minutes. [facility] suggested he used an alarm to manage his time. Receptive. [facility] provided psycho-education on how executive function is different in people who have ADHD. He reports that he's more able to recognize and manage his emotions. He's being more open with his fiance about his emotions etc. He will be starting a certificate program soon. 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 He did get a recommendation for a therapist from a co-worker and may pursue that as his finances improve  LES: No  Preg: NA  Most important: medication update, forgetfulness\"    Wilfrido was last seen 12/31/24 at which time guanfacine was restarted to 2mg at bedtime after running out of medication for 1-2 weeks. Since the last visit:  -Things have been \"a lot better\" over the past few weeks. He has felt more motivated, better emotional stability, less anger/upset.   -He restarted guanfacine after our last visit. Reports good benefit, fidgeting/impulsivity have increased somewhat.   -Eye twitching/tics have been present for as long as he can remember.   -No dizziness, fatigue -  denies ASE from guanfacine.   -Sleep: has had frequent nighttime awakenings for the past week. Waking up around 3am and eventually falls back asleep. No dreaming that he can remember.   -Appetite: Bingeing has improved- has not been a problem over the past 4-6 weeks. Eating 3 meals per day.   -He tends to zone out around 4-5pm. He is starting a graduate certificate within the next few weeks. He will be needing to study in the evening - he foresees doing 2-3 hours of studying. Adderall IR tends to wear off around 4-5 hours.   -Denies chest pain, palpitations, tachycardia. Resting HR has improved. No dizziness.     Medication ASE: Denies  Medication adherence: No concerns    Recent Psych Symptoms:   Depression:  intermittent low energy, amotivation, isolation, withdrawn   Elevated:  none  Psychosis:  none  Anxiety:   Social anxiety, increased rumination  Trauma Related:  nightmares  Insomnia:  No  Other:  No    Pertinent Negatives: No suicidal or violent ideation, psychosis, or hypo/ade.      Pertinent Social Hx:  FINANCIAL SUPPORT- Working FT in IT. Finances are a stressor.   LIVING SITUATION / RELATIONSHIPS- Lives with fiancee. Feels safe at home.  "   SOCIAL/ SPIRITUAL SUPPORT- Mother, ailyn    Pertinent Substance Use  Alcohol- None  Nicotine- None  Caffeine- 1 Diet Mountain Dew daily. Diet Pepsi at lunch. No caffeine after 3pm.   Opioids- None  Narcan Kit- N/A  THC/CBD- None  Other Illicit Drugs- none              Medical Review of Systems   Dizziness/orthostasis- Denies dizziness. Notes increased HR on standing. Denies syncope or pre-syncope.   Respiratory- Denies hx of asthma. No inhaler use ever.   Cardiovascular- Reports periods of increased heart rate at rest (up to 120s), has had several instances of this over the last few months.   Gastrointestinal- Endorses loose stools x3-4 times per week.   Tics, tremors- Hx of vocal tics/motor tics. Eye blinking daily - increases with anxiety.  Sexual health concerns- None  A comprehensive review of systems was performed and is negative other than noted above.    Denies family hx of sudden cardiac death or MI. Endorses family hx of HTN in siblings.               Psych Summary Points  08/2024: Initial consultation 08/21/24; no medication changes. EKG ordered due to report of tachycardia in the context of stimulant use.   09/2024: Started guanfacine ER 1mg at bedtime for ADHD/anxiety.   11/2024: Increased guanfacine ER to 2mg at bedtime.   12/2024: Wilfrido ran out of guanfacine about 10 days ago. Restarted at 1mg at bedtime x7 days, then back to 2mg nightly.   02/2025: Increased guanfacine ER to 3mg nightly. Changed Adderall IR 20mg QAM + 10mg every afternoon to XR 20mg QAM + IR 5mg BID for extended daytime coverage.               Past Psychotropic Medications     Medication Max Dose (mg) Dates / Duration Helpful? DC Reason / Adverse Effects?   Wellbutrin       trazodone   N    sertraline   N Took [place]orota year of high school to freshman year of HS.    Adderall XR    ineffective   Strattera   N Rashes, nausea      No history of methylphenidate products              Past Medical History     Medication allergies:     Allergies   Allergen Reactions    Strattera [Atomoxetine] Other (See Comments)     Causing chest pain with AM dose and mind fogginess in PM dose       Neurologic Hx [head injury, seizures, etc.]: Denies hx of seizure. Endorses suspected hx of concussion but unable to recall whether this was ever formally diagnosed.  Patient Active Problem List   Diagnosis    Attention deficit hyperactivity disorder (ADHD), unspecified ADHD type    Acne    Hypertrophy of breast    Sciatica without back pain, unspecified laterality    Family history of hypertension    Piriformis syndrome, right    Elevated fasting glucose     Past Medical History:   Diagnosis Date    ADHD (attention deficit hyperactivity disorder)     Anxiety     Depression                  Medications   Current Outpatient Medications   Medication Sig Dispense Refill    amphetamine-dextroamphetamine (ADDERALL XR) 20 MG 24 hr capsule Take 1 capsule (20 mg) by mouth every morning. 30 capsule 0    amphetamine-dextroamphetamine (ADDERALL) 10 MG tablet Take 0.5 tablets (5 mg) by mouth 2 times daily. 30 tablet 0    amphetamine-dextroamphetamine (ADDERALL) 20 MG tablet Take 1 tablet (20 mg) by mouth every morning. 30 tablet 0    guanFACINE (INTUNIV) 3 MG TB24 24 hr tablet Take 1 tablet (3 mg) by mouth at bedtime. 30 tablet 1                 Physical Exam  (Vitals Only)  There were no vitals taken for this visit.    Pulse Readings from Last 5 Encounters:   06/03/24 76   03/05/24 84   09/07/23 63   05/25/23 82   02/10/23 74     Wt Readings from Last 5 Encounters:   08/21/24 87.5 kg (193 lb)   06/03/24 90.3 kg (199 lb)   03/05/24 82.7 kg (182 lb 6.4 oz)   09/07/23 95.1 kg (209 lb 9.6 oz)   05/25/23 95.7 kg (211 lb)     BP Readings from Last 5 Encounters:   06/03/24 120/54   03/05/24 132/84   09/07/23 124/76   05/25/23 128/72   02/10/23 112/68                 Mental Status Exam  Alertness: alert  and oriented  Appearance: adequately groomed  Behavior/Demeanor: cooperative,  "pleasant, and calm, with good eye contact   Speech: normal and regular rate and rhythm  Language: intact and no problems  Psychomotor: normal or unremarkable  Mood:  \"much better\"  Affect: restricted; was congruent to mood  Thought Process/Associations: unremarkable  Thought Content:  Reports none;  Denies suicidal ideation, violent ideation, and delusions  Perception:  Reports none;  Denies auditory hallucinations and visual hallucinations  Insight: intact  Judgment: adequate for safety and intact  Cognition: does  appear grossly intact; formal cognitive testing was not done  oriented: time, person, and place  Gait and Station:  N/A - telehealth                Data       8/20/2024     8:23 PM 12/31/2024     7:18 AM   PROMIS-10 Total Score w/o Sub Scores   PROMIS TOTAL - SUBSCORES 28    28 27         8/20/2024     8:23 PM   CAGE-AID Total Score   Total Score 0    0   Total Score MyChart 0 (A total score of 2 or greater is considered clinically significant)         8/20/2024     8:07 PM 9/24/2024     1:33 PM 2/5/2025    10:15 AM   PHQ   PHQ-9 Total Score 2    2 12 5    Q9: Thoughts of better off dead/self-harm past 2 weeks Not at all Not at all Not at all       Patient-reported         8/30/2019     3:47 PM 9/24/2024     1:34 PM 2/5/2025    10:16 AM   GURMEET-7 SCORE   Total Score 2 (minimal anxiety)  6 (mild anxiety) 5 (mild anxiety)   Total Score 2 6 5        Patient-reported    Proxy-reported       Liver/kidney function Metabolic Blood counts   Recent Labs   Lab Test 01/25/25  1341 10/19/24  1429 04/30/18  1553   CR  --  1.17 1.15*   AST 32 107* 27   ALT 32 46 33   ALKPHOS  --  80 131    Recent Labs   Lab Test 10/19/24  1429 06/13/24  1020 09/07/23  0715   CHOL  --  158  --    TRIG  --  29  --    LDL  --  83  --    HDL  --  69  --    A1C  --   --  5.1   TSH 1.28  --  2.00    Recent Labs   Lab Test 12/09/23  1506   WBC 5.4   HGB 15.7   HCT 44.3   MCV 86           ECG results from 10/21/24   EKG 12-lead, tracing " only     Value    Systolic Blood Pressure     Diastolic Blood Pressure     Ventricular Rate 73    Atrial Rate 73    ID Interval 142    QRS Duration 110        QTc 429    P Axis 61    R AXIS 83    T Axis 60    Interpretation ECG      Sinus rhythm with sinus arrhythmia  Normal ECG    Confirmed by MD TROY, AIDA ([id number]) on 10/22/2024 5:20:24 PM         Administrative Billing:     Level of Medical Decision Making:   - At least 1 chronic problem that is not stable  - Engaged in prescription drug management during visit (discussed any medication benefits, side effects, alternatives, etc.)    The longitudinal plan of care for the diagnosis(es)/condition(s) as documented above were addressed during this visit. Due to the added complexity in care, I will continue to support Wilfrido in the subsequent management and with ongoing continuity of care.

## 2025-02-05 NOTE — PATIENT INSTRUCTIONS
Medication Plan:  -Increase guanfacine ER to 0.3mg nightly  -Change Adderall to XR 20mg QAM and IR 5mg BID for extended daytime coverage (same TDD of 30mg as previous IR regimen)    **For crisis resources, please see the information at the end of this document**   Patient Education    Thank you for coming to the [facility] [place] [facility].     Lab Testing:  If you had lab testing today and your results are reassuring or normal they will be mailed to you or sent through [employer] within 7 days. If the lab tests need quick action we will call you with the results. The phone number we will call with results is # [phone]. If this is not the best number please call our clinic and change the number.     Medication Refills:  If you need any refills please call your pharmacy and they will contact us.   Three business days of notice are needed for general medication refill requests.   Five business days of notice are needed for controlled substance refill requests.   If you need to change to a different pharmacy, please contact the new pharmacy directly. The new pharmacy will help you get your medications transferred.     Contact Us:  Please call [phone] during business hours (8-5:00 M-F).     Financial Assistance [phone]   Medical Records [phone]       MENTAL HEALTH CRISIS RESOURCES:  For a emergency help, please call 911 or go to the nearest Emergency Department.     Emergency Walk-In Options:   [facility] @ [facility] ([place]): [phone] - Specialized mental health emergency area designed to be calming  [facility] ([place]): [phone]  [facility] Acute Psychiat[facility] Services ([place]): [phone]  [facility]): [phone]    [facility] Crisis Information:   [place]: [phone]  [place]: [phone]  [place] ([place]) - Adult: [phone]     Child: [phone]  [place] - Adult: [phone]     Child:  [phone]  [place]: [phone]  List of all [facility] resources:   https://[place].gov/dhs/people-we-serve/adults/health-care/mental-health/resources/crisis-contacts.jsp    National Crisis Information:   Crisis Text Line: Text  [place]  to 650509  Suicide & Crisis Lifeline: 988  National Suicide Prevention Lifeline: 9-224-008-TALK (1-402.603.3446)       For online chat options, visit https://suicidepreventionlifeline.org/chat/  Poison Control Center: 5-280-242-3910  Trans Lifeline: 8-139-520-3728 - Hotline for transgender people of all ages  The [employer] Project: 4-686-794-1384 - Hotline for LGBT youth     For Non-Emergency Support:   Fast Tracker: Mental Health & Substance Use Disorder Resources -   https://www.CellPh[employer]trackOmnyPayn.org/

## 2025-02-05 NOTE — PROGRESS NOTES
Virtual Visit Details    Type of service:  Video Visit     Originating Location (pt. Location): Appears to be at work; did not specifically ask.     Distant Location (provider location):  Off-site  Platform used for Video Visit: [employer]

## 2025-02-17 ENCOUNTER — PATIENT OUTREACH (OUTPATIENT)
Dept: CARE COORDINATION | Facility: CLINIC | Age: 25
End: 2025-02-17
Payer: COMMERCIAL

## 2025-03-10 NOTE — PROGRESS NOTES
[facility]    Behavioral Health Clinician Progress Note   Mental Health & Addiction Services      March 11 2025    Patient Name: Wilfrido Echavarria       Service Type:  Individual   Service Location:  [employer] / Email (patient reached)   Visit Start Time: 130 PM  Visit End Time:  146 PM   Session Length: 16 - 37    Attendees: Patient   Service Modality: Video Visit:      Provider verified identity through the following two step process.  Patient provided:  Patient photo and Patient is known previously to provider    Telemedicine Visit: The patient's condition can be safely assessed and treated via synchronous audio and visual telemedicine encounter.      Reason for Telemedicine Visit: Patient convenience (e.g. access to timely appointments / distance to available provider)    Originating Site (Patient Location): Patient's home    Distant Site (Provider Location): Provider Remote Setting- Home Office    Consent:  The patient/guardian has verbally consented to: the potential risks and benefits of telemedicine (video visit) versus in person care; bill my insurance or make self-payment for services provided; and responsibility for payment of non-covered services.     Patient would like the video invitation sent by:  My Chart    Mode of Communication:  Video Conference via [employer]    Distant Location (Provider):  Off-site    As the provider I attest to compliance with applicable laws and regulations related to telemedicine.   Visit number: 5    [facility] Visit Activities (Refresh list every visit): [facility] Only     Diagnostic Assessment Date: 08/21/2024   Treatment Plan Review Date: 05/12/2025      DATA:    Extended Session (60+ minutes): No   Interactive Complexity: No   Crisis: No   [facility] Patient: No     Assessments completed prior to visit:   PHQ2:       3/11/2025     1:24 PM 12/31/2024     7:17 AM 9/24/2024     1:32 PM 3/5/2024     8:15 AM   PHQ-2 ( 2000 [employer])   Q1: Little  interest or pleasure in doing things 0 1 2 0   Q2: Feeling down, depressed or hopeless 1 1 0 0   PHQ-2 Score 1 2  2 0   Q1: Little interest or pleasure in doing things  Several days More than half the days    Q2: Feeling down, depressed or hopeless  Several days Not at all    PHQ-2 Score  2 2        Patient-reported     PHQ9:       10/20/2022    10:40 AM 2/10/2023     9:05 AM 9/7/2023     5:11 AM 3/5/2024     6:54 AM 8/20/2024     8:07 PM 9/24/2024     1:33 PM 2/5/2025    10:15 AM   PHQ-9 SCORE   PHQ-9 Total Score MyChart 12 (Moderate depression) 0 0 3 (Minimal depression) 2 (Minimal depression) 12 (Moderate depression) 5 (Mild depression)   PHQ-9 Total Score 12 0 0 3 2    2 12 5        Patient-reported     GAD2:       8/20/2024     8:22 PM 12/31/2024     7:17 AM   GURMEET-2   Feeling nervous, anxious, or on edge 1 1   Not being able to stop or control worrying 0 1   GURMEET-2 Total Score 1    1 2        Patient-reported     GAD7:       7/12/2018     3:51 PM 9/25/2018     5:15 PM 11/21/2018     1:28 PM 1/23/2019    11:43 AM 8/30/2019     3:47 PM 9/24/2024     1:34 PM 2/5/2025    10:16 AM   GURMEET-7 SCORE   Total Score     2 (minimal anxiety)  6 (mild anxiety) 5 (mild anxiety)   Total Score 3 8 6 7 2 6 5        Patient-reported    Proxy-reported     PROMIS 10-Global Health (all questions and answers displayed):       8/20/2024     8:23 PM 12/31/2024     7:18 AM   PROMIS 10   In general, would you say your health is: Excellent Good   In general, would you say your quality of life is: Very good Very good   In general, how would you rate your physical health? Excellent Excellent   In general, how would you rate your mental health, including your mood and your ability to think? Good Good   In general, how would you rate your satisfaction with your social activities and relationships? Fair Fair   In general, please rate how well you carry out your usual social activities and roles Good Good   To what extent are you able to carry out  your everyday physical activities such as walking, climbing stairs, carrying groceries, or moving a chair? Completely Mostly   In the past 7 days, how often have you been bothered by emotional problems such as feeling anxious, depressed, or irritable? Sometimes Often   In the past 7 days, how would you rate your fatigue on average? Moderate Moderate   In the past 7 days, how would you rate your pain on average, where 0 means no pain, and 10 means worst imaginable pain? 5 2   In general, would you say your health is: 5 3   In general, would you say your quality of life is: 4 4   In general, how would you rate your physical health? 5 5   In general, how would you rate your mental health, including your mood and your ability to think? 3 3   In general, how would you rate your satisfaction with your social activities and relationships? 2 2   In general, please rate how well you carry out your usual social activities and roles. (This includes activities at home, at work and in your community, and responsibilities as a parent, child, spouse, employee, friend, etc.) 3 3   To what extent are you able to carry out your everyday physical activities such as walking, climbing stairs, carrying groceries, or moving a chair? 5 4   In the past 7 days, how often have you been bothered by emotional problems such as feeling anxious, depressed, or irritable? 3 4   In the past 7 days, how would you rate your fatigue on average? 3 3   In the past 7 days, how would you rate your pain on average, where 0 means no pain, and 10 means worst imaginable pain? 5 2   Global Mental Health Score 12    12 11   Global Physical Health Score 16    16 16   PROMIS TOTAL - SUBSCORES 28    28 27     PROMIS 10-Global Health (only subscores and total score):       8/20/2024     8:23 PM 12/31/2024     7:18 AM   PROMIS-10 Scores Only   Global Mental Health Score 12    12 11   Global Physical Health Score 16    16 16   PROMIS TOTAL - SUBSCORES 28    28 27  "      Reason for Visit/Presenting Concern:  ADHD    Current Stressors / Issues:   MH update: Pt reports that  started his certificate programs on March 1st. When he's completed he will start looking for a new job in . 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 He plans on re-enlisting but to a different job. He reports that the medication has \"changed my life\". He's more able to focus, \"Tribe put a dam in my brain\", racing thoughts have stopped, speaking up in meetings more, improved confidence, enhanced his motivation to study. He has noticed that the sx relief is at its peak around 3-4 PM. He has been reflecting on how his life would've been different if he'd been serious about his treatment when he was in college. He and his fiance are getting  in September and those plans are moving along. He reports one incident where after coming home from [employer] he started to spiral which resulted in an anger outburst. He's unsure why this happened but it was frightening.   Stresses: school  Appetite: unremarkable  Sleep: unremarkable  Therapy: therapy through work  LES: No  Preg: NA  Most important: medication update, he had an incident of feeling overwhelmed after he got home from [employer] and was frightening for he and his SO        Therapeutic Interventions:  Psycho-education: Provided psycho-education about patient's behavioral health condition and symptoms.    Response to treatment interventions:   Patient was receptive to interventions utilized.  Patient was engaged in the therapy process.       Progress on Treatment Objective(s) / Homework:   Satisfactory progress - ACTION (Actively working towards change); Intervened by reinforcing change plan / affirming steps taken     Medication Review:   No changes to current psychiatric medication(s)     Medication Compliance:   Yes     Chemical Use Review:  Substance Use: Chemical use reviewed, no active concerns " identified      Tobacco Use: No current tobacco use.       Assessment: Current Emotional / Mental Status (status of significant symptoms):    Risk status (Self / Other harm or suicidal ideation)   Patient denies a history of suicidal ideation, suicide attempts, self-injurious behavior, homicidal ideation, homicidal behavior, and and other safety concerns   Patient denies current fears or concerns for personal safety.   Patient denies current or recent suicidal ideation or behaviors.   Patient denies current or recent homicidal ideation or behaviors.   Patient denies current or recent self injurious behavior or ideation.   Patient denies other safety concerns.   Recommended that patient call 911 or go to the local ED should there be a change in any of these risk factors      ASSESSMENT:   Mental Status:     Appearance:   Appropriate    Eye Contact:   Good    Psychomotor Behavior: Normal    Attitude:   Cooperative    Orientation:   All   Speech Rate / Production: Normal    Volume:   Normal    Mood:    Normal   Affect:    Appropriate    Thought Content:  Clear    Thought Form:  Coherent  Logical    Insight:    Good          Diagnoses:   Attention deficit hyperactivity disorder (ADHD), combined type    Collateral Reports Completed:   Collaborated with [facility] team        Plan: (Homework, other):   Patient was provided No indications of CD issues  Patient was given information about behavioral services and encouraged to schedule a follow up appointment with the clinic [facility] in 1 month.         JERMAINE Watson, [facility]     _____________________________________________________________________________________________________________________________________                                              Individual Treatment Plan    Patient's Name: Wilfrido Echavarria   YOB: 2000  Date of Creation: 11/12/2024  Date Treatment Plan Last Reviewed/Revised: 02/12/2025    DSM5 Diagnoses: Attention-Deficit/Hyperactivity  Disorder  314.01 (F90.2) Combined presentation  Psychosocial / Contextual Factors: Occupational Issues  PROMIS (reviewed every 90 days):   The following assessments were completed by patient for this visit:  PROMIS 10-Global Health (all questions and answers displayed):       8/20/2024     8:23 PM 12/31/2024     7:18 AM   PROMIS 10   In general, would you say your health is: Excellent Good   In general, would you say your quality of life is: Very good Very good   In general, how would you rate your physical health? Excellent Excellent   In general, how would you rate your mental health, including your mood and your ability to think? Good Good   In general, how would you rate your satisfaction with your social activities and relationships? Fair Fair   In general, please rate how well you carry out your usual social activities and roles Good Good   To what extent are you able to carry out your everyday physical activities such as walking, climbing stairs, carrying groceries, or moving a chair? Completely Mostly   In the past 7 days, how often have you been bothered by emotional problems such as feeling anxious, depressed, or irritable? Sometimes Often   In the past 7 days, how would you rate your fatigue on average? Moderate Moderate   In the past 7 days, how would you rate your pain on average, where 0 means no pain, and 10 means worst imaginable pain? 5 2   In general, would you say your health is: 5 3   In general, would you say your quality of life is: 4 4   In general, how would you rate your physical health? 5 5   In general, how would you rate your mental health, including your mood and your ability to think? 3 3   In general, how would you rate your satisfaction with your social activities and relationships? 2 2   In general, please rate how well you carry out your usual social activities and roles. (This includes activities at home, at work and in your community, and responsibilities as a parent, child,  "spouse, employee, friend, etc.) 3 3   To what extent are you able to carry out your everyday physical activities such as walking, climbing stairs, carrying groceries, or moving a chair? 5 4   In the past 7 days, how often have you been bothered by emotional problems such as feeling anxious, depressed, or irritable? 3 4   In the past 7 days, how would you rate your fatigue on average? 3 3   In the past 7 days, how would you rate your pain on average, where 0 means no pain, and 10 means worst imaginable pain? 5 2   Global Mental Health Score 12    12 11   Global Physical Health Score 16    16 16   PROMIS TOTAL - SUBSCORES 28    28 27     PROMIS 10-Global Health (only subscores and total score):       8/20/2024     8:23 PM 12/31/2024     7:18 AM   PROMIS-10 Scores Only   Global Mental Health Score 12    12 11   Global Physical Health Score 16    16 16   PROMIS TOTAL - SUBSCORES 28    28 27        Referral / Collaboration:  Collaborated with [facility] team .    Anticipated number of session for this episode of care:  6-9 sessions  Anticipation frequency of session: Monthly  Anticipated Duration of each session: 16-37 minutes  Treatment plan will be reviewed in 90 days or when goals have been changed.     [facility] will provide psycho-education on skills to manage ADHD including:  Goal Setting  Externalizing Cues (notes, calendars,alarms, white board)  Use of Planner  Environmental Modification (wear headphones to manage sensory overload or having an escape plan to remove self from overwhelming situations, use of headphones to assist with studying e.g)  Breaking Task to Smaller Parts .    MeasurableTreatment Goal(s) related to diagnosis / functional impairment(s)  Goal 1: Patient will  experience improved attention   \"I will know I've met my goal when if I'm able to focus on on thing at a time.\"     Objective #A (Patient Action)  Patient will  experience improved attention  Status: New - Date: 02/12/2025      Intervention(s)  [facility] " will Psycho-education: Provide psycho-education about patient's behavioral health condition and symptoms.       Patient has reviewed and agreed to the above plan.     Written by  Heavenly Flores, LIC, [facility]

## 2025-03-11 ENCOUNTER — VIRTUAL VISIT (OUTPATIENT)
Dept: BEHAVIORAL HEALTH | Facility: CLINIC | Age: 25
End: 2025-03-11
Payer: COMMERCIAL

## 2025-03-11 ENCOUNTER — VIRTUAL VISIT (OUTPATIENT)
Dept: PSYCHIATRY | Facility: CLINIC | Age: 25
End: 2025-03-11
Payer: COMMERCIAL

## 2025-03-11 VITALS — BODY MASS INDEX: 27.12 KG/M2 | WEIGHT: 200 LBS

## 2025-03-11 DIAGNOSIS — F90.9 ATTENTION DEFICIT HYPERACTIVITY DISORDER (ADHD), UNSPECIFIED ADHD TYPE: ICD-10-CM

## 2025-03-11 DIAGNOSIS — F33.1 MDD (MAJOR DEPRESSIVE DISORDER), RECURRENT EPISODE, MODERATE (H): ICD-10-CM

## 2025-03-11 DIAGNOSIS — F90.2 ATTENTION DEFICIT HYPERACTIVITY DISORDER (ADHD), COMBINED TYPE: Primary | ICD-10-CM

## 2025-03-11 DIAGNOSIS — F41.1 GAD (GENERALIZED ANXIETY DISORDER): ICD-10-CM

## 2025-03-11 PROCEDURE — G2211 COMPLEX E/M VISIT ADD ON: HCPCS | Mod: 95

## 2025-03-11 PROCEDURE — 1125F AMNT PAIN NOTED PAIN PRSNT: CPT

## 2025-03-11 PROCEDURE — 90832 PSYTX W PT 30 MINUTES: CPT | Mod: 95 | Performed by: SOCIAL WORKER

## 2025-03-11 PROCEDURE — 98006 SYNCH AUDIO-VIDEO EST MOD 30: CPT

## 2025-03-11 RX ORDER — DEXTROAMPHETAMINE SACCHARATE, AMPHETAMINE ASPARTATE MONOHYDRATE, DEXTROAMPHETAMINE SULFATE AND AMPHETAMINE SULFATE 6.25; 6.25; 6.25; 6.25 MG/1; MG/1; MG/1; MG/1
25 CAPSULE, EXTENDED RELEASE ORAL EVERY MORNING
Qty: 30 CAPSULE | Refills: 0 | Status: SHIPPED | OUTPATIENT
Start: 2025-03-11

## 2025-03-11 RX ORDER — GUANFACINE 3 MG/1
3 TABLET, EXTENDED RELEASE ORAL AT BEDTIME
Qty: 30 TABLET | Refills: 2 | Status: SHIPPED | OUTPATIENT
Start: 2025-03-11

## 2025-03-11 RX ORDER — DEXTROAMPHETAMINE SACCHARATE, AMPHETAMINE ASPARTATE, DEXTROAMPHETAMINE SULFATE AND AMPHETAMINE SULFATE 2.5; 2.5; 2.5; 2.5 MG/1; MG/1; MG/1; MG/1
5 TABLET ORAL 2 TIMES DAILY
Qty: 30 TABLET | Refills: 0 | Status: SHIPPED | OUTPATIENT
Start: 2025-03-17

## 2025-03-11 ASSESSMENT — PAIN SCALES - GENERAL: PAINLEVEL_OUTOF10: MODERATE PAIN (5)

## 2025-03-11 NOTE — PATIENT INSTRUCTIONS
Plan:  -Continue guanfacine ER 3mg nightly  -Increase Adderall XR to 25mg QAM and continue IR 5mg BID for extended daytime coverage (TDD: 35mg)    **For crisis resources, please see the information at the end of this document**   Patient Education    Thank you for coming to the [facility] MENTAL HEALTH & [facility].     Lab Testing:  If you had lab testing today and your results are reassuring or normal they will be mailed to you or sent through [employer] within 7 days. If the lab tests need quick action we will call you with the results. The phone number we will call with results is # [phone]. If this is not the best number please call our clinic and change the number.     Medication Refills:  If you need any refills please call your pharmacy and they will contact us.   Three business days of notice are needed for general medication refill requests.   Five business days of notice are needed for controlled substance refill requests.   If you need to change to a different pharmacy, please contact the new pharmacy directly. The new pharmacy will help you get your medications transferred.     Contact Us:  Please call [phone] during business hours (8-5:00 M-F).     Financial Assistance [phone]   Medical Records [phone]       MENTAL HEALTH CRISIS RESOURCES:  For a emergency help, please call 911 or go to the nearest Emergency Department.     Emergency Walk-In Options:   EmPATH Unit @ [facility] ([place]): [phone] - Specialized mental health emergency area designed to be calming  [facility] ([place]): [phone]  [facility] ([place]): [phone]  [facility]): [phone]    [facility] Crisis Information:   [place]: [phone]  [place]: [phone]  [place] ([place]) - Adult: [phone]     Child: [phone]  [place] - Adult: [phone]     Child: [phone]  [place]: [phone]  List of all [place]  [facility] resources:   https://[place].gov/dhs/people-we-serve/adults/health-care/mental-health/resources/crisis-contacts.jsp    National Crisis Information:   Crisis Text Line: Text  [place]  to 844700  Suicide & Crisis Lifeline: 988  National Suicide Prevention Lifeline: 2-791-333-TALK (4-468-803-0880)       For online chat options, visit https://suicidepreventionlifeline.org/chat/  Poison Control Center: 7-212-722-5682  Trans Lifeline: 0-409-765-8860 - Hotline for transgender people of all ages  The [employer]: 3-841-177-1736 - Hotline for LGBT youth     For Non-Emergency Support:   Fast Tracker: Mental Health & Substance Use Disorder Resources -   https://www.ExecOnlinen.org/

## 2025-03-11 NOTE — PROGRESS NOTES
[facility]  Collaborative Care Psychiatry Service ([facility])  MEDICAL PROGRESS NOTE     Wilfrido Echavarria is a 24 year old adult who prefers the name Wilfrido.     Initial consultation on 08/21/24.      CARE TEAM:   PCP- Yudith Retana  Therapist- None              Assessment & Plan     History and interview support the following diagnoses:   MDD  GURMEET  ADHD    Wilfrido is a 24-year-old adult with history of depression, anxiety, and ADHD who presents for psychiatric follow-up with [facility].     Wilfrido was last seen 02/05/25 at which time guanfacine was increased to 3mg nightly and Adderall dosing was changed to XR 20mg QAM + 5mg BID to extend daytime coverage in the setting of starting a new educational program. Today:    -Has now started a certificate program while working FT. Struggling with ADHD symptoms however the recent change to 5mg BID has been effective at sustaining coverage.  -Notes longstanding difficulty with reading. He was in special education classes between 6th-8th grade and struggled academically until he was treated for ADHD. It seems there could be some component of ADHD that requires him to re-read sentences for full comprehension and I wonder if an underlying learning disability is also at play. He wears contacts and eyeglasses and is overdue for an eye exam- I encouraged him to follow-up on this.  -Mood dysregulation as been improving with use of guanfacine however, he experienced a recurrent episode within the past few weeks and this was distressing.     Plan:  -Increase Adderall XR from 20mg to 25mg daily. Monitor anxiety/agitation. He denies cardiovascular SE. He was asked to message clinic before next refill is due with an update on the higher dose.   -We discussed taking days off from stimulant use to prevent tolerance however Wilfrido struggles with binge eating and symptoms worsen notably on days when he does not take  his stimulant. I encouraged him to look into treatment for potential ED and provided resources within the community to do so.  -Encouraged him to complete an updated eye exam and discuss concerns related to reading/visual focus. I also provided him with information for [facility] to look into testing/supports for potential learning disability.    Assessment from initial consultation on 08/21/24:  Patient reports several different concerns today. He reports history of increased anger and difficulty controlling his emotions. He has no history of physical aggression or violence towards others however at times he is verbally reactive and says things he later on regrets. This has impacted his relationships and his quality of life. He doesn't feel that depression is a current concern. Anxiety, impulsivity, and feeling tense and always on edge tend to be the symptoms that are most disruptive to his life. He does carry a diagnosis of ADHD and has only ever been on amphetamine based stimulants. He does feel that Adderall helps him fidget less but continues to have a hard time with focus and staying on track, especially when reading or attempting to study.    Patient's history is significant for notable trauma. He endorses regular trauma-related nightmares that can disrupt sleep at times. In general he feels that he is getting enough sleep since implemented a consistent sleep schedule. He carries genetic loading for bipolar disorder however based on history and assessment, he does not present with symptoms consistent with hypo/ade.     Patient reports symptoms of periodic tachycardia at rest based on alerts he receives from his watch. He also notes periodic orthostasis. Given these symptoms, I've asked him to complete an updated EKG through his PCP before we change his medication regimen. If symptoms of tachycardia persist or worsen, he was instructed to hold his Adderall dose and seek urgent  medical care.     Future considerations:  -We discussed use of clonidine for behavioral dysregulation related to autonomic overactivation which may have some benefit for nightmares and concentration. Prone to orthostasis, must be titrated slowly. Once he repeats an EKG we will first focus on starting this medication as it will help target anxiety/overactivation/trauma nightmares/concentration. He also reports a history of tics, worsened in the setting of anxiety/started before stimulant use, which clonidine may also help treat.   -Switching from Adderall to methylphenidate which may somewhat reduce the risk of HTN and tachycardia. May also contribute less to agitation/overactivation.  -Consider use of selective serotonin reuptake inhibitor (Lexapro could be a good option) to target anxiety/mood symptoms.     Psychotherapy discussion: Primary recommendation for the treatment of mood symptoms, especially anxiety. Supportive therapy can be useful for reducing the impact of acute or chronic psychosocial stressors. CBT can be particularly helpful for ruminative thinking and addressing maladaptive coping mechanisms that may worsen anxiety.     PSYCHOTROPIC DRUG INTERACTIONS: **Italicized interactions are for treatment plan options not currently implemented**  none    MANAGEMENT:  N/A    [place] was [place] today: Indicates controlled medications refilled as prescribed                Plan    1) PSYCHOTROPIC MEDICATION RECOMMENDATIONS:  -Continue guanfacine ER 3mg nightly  -Increase Adderall XR to 25mg QAM and continue IR 5mg BID for extended daytime coverage (TDD: 35mg)    2) THERAPY: None    3) NEXT DUE:   Labs- AST elevated slightly (107) on 10/19/24. All other hepatic labs WNL. AST normalized on repeat testing 01/25/25.   ECG- EKG completed 10/21/24 and WNL per cardiology review.    4) REFERRALS / COORDINATION: None    5) DISPOSITION:   - Pt would benefit from brief psychiatric intervention (up to ~5 visits) to adjust  "meds and gauge for benefit.   - Follow up with SELMA Centeno CNP in 8 weeks. Plan to transition med management back to designated prescriber after brief care is complete.   - If not seen for 6 months, follow up and prescribing will automatically revert back to referring provider / PCP.     Treatment Risk Statement:  The patient understands the risks, benefits, adverse effects and alternatives. Agrees to treatment with the capacity to do so. No medical contraindications to treatment. Agrees to contact provider for any problems. The patient understands to call 911 or go to the nearest ED if urgent or life threatening symptoms occur. Crisis contact numbers are provided routinely in the After Visit Summary.    Suicide Risk Assessment: Wilfrido did not appear to be an imminent safety risk to self or others.    PROVIDER:  SELMA Centeno CNP    The [facility] psychiatry providers act as a specialty service for Primary Care Providers in the [facility] who seek to optimize medications for unstable patients. Once medications have been optimized, [facility] providers discharge the patient back to the referring Primary Care Provider for ongoing medication management. Care team has reviewed attendance agreement with patient. Patient advised that two failed appointments within 6 months may lead to termination of current episode of care.     Patient Status:  Patient will continue to be seen for ongoing consultation and stabilization.              Interim History    Per [facility] Heavenly Flores during today's team-based visit: \"Pt reports that  started his certificate programs on March 1st. When he's completed he will start looking for a new job in . 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 He plans on re-enlisting but to a different job. He reports that the medication has \"changed my life\". He's more able to focus, \"[place] put a dam in my brain\", racing thoughts have " "stopped, speaking up in meetings more, improved confidence, enhanced his motivation to study. He has noticed that the sx relief is at its peak around 3-4 PM. He has been reflecting on how his life would've been different if he'd been serious about his treatment when he was in college. He and his fiance are getting  in September and those plans are moving along. He reports one incident where after coming home from [employer] he started to spiral which resulted in an anger outburst. He's unsure why this happened but it was frightening.   Stresses: school  Appetite: unremarkable  Sleep: unremarkable  Therapy: therapy through work  LES: No  Preg: NA  Most important: medication update, he had an incident of feeling overwhelmed after he got home from [employer] and was frightening for he and his SO\"    Wilfrido was last seen 02/05/25 at which time guanfacine was increased to 3mg nightly and Adderall dosing was changed to XR 20mg QAM + 5mg BID to extend daytime coverage in the setting of starting a new educational program. Since the last visit:  -Recent anger outburst occurred during his drill weekend- this happened Sunday evening after a busy weekend. Felt very frustrated, irritable, angry with unclear reason/no apparent trigger. He threw a dish soap villeda at the wall. He opted to leave the home and take space until he was able to come back to the home later in the evening.   -Episodes of mood dysregulation (anger/frustration) have occurred in the past; feels that episodes have reduced in frequency since starting guanfacine.   -He notes that there is some lingering tension between himself and his fiancee.  -He recently started a certificate program - this will be a year long program. Having a hard time staying on track with reading, having to re-read sentences frequently which impacts productivity at work and with school. Fidgeting, distractibility have improved somewhat but continue to impact productivity. Working long " hours.  -He's taking Adderall 5mg at 2:30 and again at 4:30p- feels this is working well.  -In middle school he was in special education 6th-8th grade. Last eye exam was 2023. Wonders if his eyes have trouble focusing on the written material.     Medication ASE: Denies  Medication adherence: No concerns    Recent Psych Symptoms:   Depression:  intermittent low energy, amotivation, isolation, withdrawn   Elevated:  none  Psychosis:  none  Anxiety:   Social anxiety, increased rumination  Trauma Related:  nightmares  Insomnia:  No  Other:  No    Pertinent Negatives: No suicidal or violent ideation, psychosis, or hypo/ade.      Pertinent Social Hx:  FINANCIAL SUPPORT- Working FT in IT. Finances are a stressor.   LIVING SITUATION / RELATIONSHIPS- Lives with ailyn. Feels safe at home.    SOCIAL/ SPIRITUAL SUPPORT- Mother, ailyn    Pertinent Substance Use  Alcohol- None  Nicotine- None  Caffeine- 1 Diet Mountain Dew daily. Diet Pepsi at lunch. No caffeine after 3pm.   Opioids- None  Narcan Kit- N/A  THC/CBD- None  Other Illicit Drugs- none              Medical Review of Systems   Dizziness/orthostasis- Denies dizziness. Notes increased HR on standing. Denies syncope or pre-syncope.   Respiratory- Denies hx of asthma. No inhaler use ever.   Cardiovascular- Reports periods of increased heart rate at rest (up to 120s), has had several instances of this over the last few months.   Gastrointestinal- Endorses loose stools x3-4 times per week.   Tics, tremors- Hx of vocal tics/motor tics. Eye blinking daily - increases with anxiety.  Sexual health concerns- None  A comprehensive review of systems was performed and is negative other than noted above.    Denies family hx of sudden cardiac death or MI. Endorses family hx of HTN in siblings.               Psych Summary Points  08/2024: Initial consultation 08/21/24; no medication changes. EKG ordered due to report of tachycardia in the context of stimulant use.   09/2024:  Started guanfacine ER 1mg at bedtime for ADHD/anxiety.   11/2024: Increased guanfacine ER to 2mg at bedtime.   12/2024: Wilfrido ran out of guanfacine about 10 days ago. Restarted at 1mg at bedtime x7 days, then back to 2mg nightly.   02/2025: Increased guanfacine ER to 3mg nightly. Changed Adderall IR 20mg QAM + 10mg every afternoon to XR 20mg QAM + IR 5mg BID for extended daytime coverage.   03/2025: Increased morning dose of Adderall XR from 20mg to 25mg daily for additional support while in school + working.              Past Psychotropic Medications     Medication Max Dose (mg) Dates / Duration Helpful? DC Reason / Adverse Effects?   Wellbutrin       trazodone   N    sertraline   N Took [place] year of high school to freshman year of HS.    Adderall XR    ineffective   Strattera   N Rashes, nausea      No history of methylphenidate products              Past Medical History     Medication allergies:    Allergies   Allergen Reactions    Strattera [Atomoxetine] Other (See Comments)     Causing chest pain with AM dose and mind fogginess in PM dose       Neurologic Hx [head injury, seizures, etc.]: Denies hx of seizure. Endorses suspected hx of concussion but unable to recall whether this was ever formally diagnosed.  Patient Active Problem List   Diagnosis    Attention deficit hyperactivity disorder (ADHD), unspecified ADHD type    Acne    Hypertrophy of breast    Sciatica without back pain, unspecified laterality    Family history of hypertension    Piriformis syndrome, right    Elevated fasting glucose     Past Medical History:   Diagnosis Date    ADHD (attention deficit hyperactivity disorder)     Anxiety     Depression                  Medications   Current Outpatient Medications   Medication Sig Dispense Refill    amphetamine-dextroamphetamine (ADDERALL XR) 20 MG 24 hr capsule Take 1 capsule (20 mg) by mouth every morning. 30 capsule 0    amphetamine-dextroamphetamine (ADDERALL) 10 MG tablet Take 0.5 tablets (5  "mg) by mouth 2 times daily. 30 tablet 0    guanFACINE (INTUNIV) 3 MG TB24 24 hr tablet Take 1 tablet (3 mg) by mouth at bedtime. 30 tablet 1                 Physical Exam  (Vitals Only)  Wt 90.7 kg (200 lb)   BMI 27.12 kg/m      Pulse Readings from Last 5 Encounters:   06/03/24 76   03/05/24 84   09/07/23 63   05/25/23 82   02/10/23 74     Wt Readings from Last 5 Encounters:   03/11/25 90.7 kg (200 lb)   08/21/24 87.5 kg (193 lb)   06/03/24 90.3 kg (199 lb)   03/05/24 82.7 kg (182 lb 6.4 oz)   09/07/23 95.1 kg (209 lb 9.6 oz)     BP Readings from Last 5 Encounters:   06/03/24 120/54   03/05/24 132/84   09/07/23 124/76   05/25/23 128/72   02/10/23 112/68                 Mental Status Exam  Alertness: alert  and oriented  Appearance: adequately groomed  Behavior/Demeanor: cooperative, pleasant, and calm, with good eye contact   Speech: normal and regular rate and rhythm  Language: intact and no problems  Psychomotor: normal or unremarkable  Mood:  \"better\"  Affect: restricted; was congruent to mood  Thought Process/Associations: unremarkable  Thought Content:  Reports none;  Denies suicidal ideation, violent ideation, and delusions  Perception:  Reports none;  Denies auditory hallucinations and visual hallucinations  Insight: intact  Judgment: adequate for safety and intact  Cognition: does  appear grossly intact; formal cognitive testing was not done  oriented: time, person, and place  Gait and Station:  N/A - telehealth                Data       8/20/2024     8:23 PM 12/31/2024     7:18 AM   PROMIS-10 Total Score w/o Sub Scores   PROMIS TOTAL - SUBSCORES 28    28 27         8/20/2024     8:23 PM   CAGE-AID Total Score   Total Score 0    0   Total Score MyChart 0 (A total score of 2 or greater is considered clinically significant)         8/20/2024     8:07 PM 9/24/2024     1:33 PM 2/5/2025    10:15 AM   PHQ   PHQ-9 Total Score 2    2 12 5    Q9: Thoughts of better off dead/self-harm past 2 weeks Not at all Not at " all Not at all       Patient-reported         8/30/2019     3:47 PM 9/24/2024     1:34 PM 2/5/2025    10:16 AM   GURMEET-7 SCORE   Total Score 2 (minimal anxiety)  6 (mild anxiety) 5 (mild anxiety)   Total Score 2 6 5        Patient-reported    Proxy-reported       Liver/kidney function Metabolic Blood counts   Recent Labs   Lab Test 01/25/25  1341 10/19/24  1429 04/30/18  1553   CR  --  1.17 1.15*   AST 32 107* 27   ALT 32 46 33   ALKPHOS  --  80 131    Recent Labs   Lab Test 10/19/24  1429 06/13/24  1020 09/07/23  0715   CHOL  --  158  --    TRIG  --  29  --    LDL  --  83  --    HDL  --  69  --    A1C  --   --  5.1   TSH 1.28  --  2.00    Recent Labs   Lab Test 12/09/23  1506   WBC 5.4   HGB 15.7   HCT 44.3   MCV 86           ECG results from 10/21/24   EKG 12-lead, tracing only     Value    Systolic Blood Pressure     Diastolic Blood Pressure     Ventricular Rate 73    Atrial Rate 73    NH Interval 142    QRS Duration 110        QTc 429    P Axis 61    R AXIS 83    T Axis 60    Interpretation ECG      Sinus rhythm with sinus arrhythmia  Normal ECG    Confirmed by MD TROY, AIDA ([id number]) on 10/22/2024 5:20:24 PM         Administrative Billing:     Level of Medical Decision Making:   - At least 1 chronic problem that is not stable  - Engaged in prescription drug management during visit (discussed any medication benefits, side effects, alternatives, etc.)    The longitudinal plan of care for the diagnosis(es)/condition(s) as documented above were addressed during this visit. Due to the added complexity in care, I will continue to support Wilfrido in the subsequent management and with ongoing continuity of care.

## 2025-03-11 NOTE — NURSING NOTE
Is the patient currently in the state of [place]? YES    Current patient location: [address] [place] [address] [address] [place] [address]    Visit mode:Video    If the visit is dropped, the patient can be reconnected by: VIDEO VISIT:  Send e-mail to at [email or web address]    Will anyone else be joining the visit? No  (If patient encounters technical issues they should call [phone])    Are changes needed to the allergy or medication list? No    Are refills needed on medications prescribed by this physician? No    Rooming Documentation: Questionnaire(s) completed.    Reason for visit: PAUL Oneil

## 2025-04-01 NOTE — PROGRESS NOTES
[facility]    Behavioral Health Clinician Progress Note   Mental Health & Addiction Services      April 2 2025    Patient Name: Wilfrido Echavarria       Service Type:  Individual   Service Location:  [email or web address] / Email (patient reached)   Visit Start Time: 1100 AM  Visit End Time:  1107 AM   Session Length: 7 min    Attendees: Patient   Service Modality: Video Visit:      Provider verified identity through the following two step process.  Patient provided:  Patient photo and Patient is known previously to provider    Telemedicine Visit: The patient's condition can be safely assessed and treated via synchronous audio and visual telemedicine encounter.      Reason for Telemedicine Visit: Patient convenience (e.g. access to timely appointments / distance to available provider)    Originating Site (Patient Location): Patient's home    Distant Site (Provider Location): Provider Remote Setting- Home Office    Consent:  The patient/guardian has verbally consented to: the potential risks and benefits of telemedicine (video visit) versus in person care; bill my insurance or make self-payment for services provided; and responsibility for payment of non-covered services.     Patient would like the video invitation sent by:  My Chart    Mode of Communication:  Video Conference via [facility]    Distant Location (Provider):  Off-site    As the provider I attest to compliance with applicable laws and regulations related to telemedicine.   Visit number: 6    [facility] Visit Activities (Refresh list every visit): [facility] Only     Diagnostic Assessment Date: 08/21/2024   Treatment Plan Review Date: 05/12/2025      DATA:    Extended Session (60+ minutes): No   Interactive Complexity: No   Crisis: No   [facility] Patient: No     Assessments completed prior to visit:   PHQ2:       3/11/2025     1:24 PM 12/31/2024     7:17 AM 9/24/2024     1:32 PM 3/5/2024     8:15 AM   PHQ-2 ( 2000 [employer])   Q1: Little  interest or pleasure in doing things 0 1 2 0   Q2: Feeling down, depressed or hopeless 1 1 0 0   PHQ-2 Score 1 2  2 0   Q1: Little interest or pleasure in doing things  Several days More than half the days    Q2: Feeling down, depressed or hopeless  Several days Not at all    PHQ-2 Score  2 2        Patient-reported     PHQ9:       10/20/2022    10:40 AM 2/10/2023     9:05 AM 9/7/2023     5:11 AM 3/5/2024     6:54 AM 8/20/2024     8:07 PM 9/24/2024     1:33 PM 2/5/2025    10:15 AM   PHQ-9 SCORE   PHQ-9 Total Score MyChart 12 (Moderate depression) 0 0 3 (Minimal depression) 2 (Minimal depression) 12 (Moderate depression) 5 (Mild depression)   PHQ-9 Total Score 12 0 0 3 2    2 12 5        Patient-reported     GAD2:       8/20/2024     8:22 PM 12/31/2024     7:17 AM   GURMEET-2   Feeling nervous, anxious, or on edge 1 1   Not being able to stop or control worrying 0 1   GURMEET-2 Total Score 1    1 2        Patient-reported     GAD7:       7/12/2018     3:51 PM 9/25/2018     5:15 PM 11/21/2018     1:28 PM 1/23/2019    11:43 AM 8/30/2019     3:47 PM 9/24/2024     1:34 PM 2/5/2025    10:16 AM   GURMEET-7 SCORE   Total Score     2 (minimal anxiety)  6 (mild anxiety) 5 (mild anxiety)   Total Score 3 8 6 7 2 6 5        Patient-reported    Proxy-reported     PROMIS 10-Global Health (all questions and answers displayed):       8/20/2024     8:23 PM 12/31/2024     7:18 AM   PROMIS 10   In general, would you say your health is: Excellent Good   In general, would you say your quality of life is: Very good Very good   In general, how would you rate your physical health? Excellent Excellent   In general, how would you rate your mental health, including your mood and your ability to think? Good Good   In general, how would you rate your satisfaction with your social activities and relationships? Fair Fair   In general, please rate how well you carry out your usual social activities and roles Good Good   To what extent are you able to carry out  your everyday physical activities such as walking, climbing stairs, carrying groceries, or moving a chair? Completely Mostly   In the past 7 days, how often have you been bothered by emotional problems such as feeling anxious, depressed, or irritable? Sometimes Often   In the past 7 days, how would you rate your fatigue on average? Moderate Moderate   In the past 7 days, how would you rate your pain on average, where 0 means no pain, and 10 means worst imaginable pain? 5 2   In general, would you say your health is: 5 3   In general, would you say your quality of life is: 4 4   In general, how would you rate your physical health? 5 5   In general, how would you rate your mental health, including your mood and your ability to think? 3 3   In general, how would you rate your satisfaction with your social activities and relationships? 2 2   In general, please rate how well you carry out your usual social activities and roles. (This includes activities at home, at work and in your community, and responsibilities as a parent, child, spouse, employee, friend, etc.) 3 3   To what extent are you able to carry out your everyday physical activities such as walking, climbing stairs, carrying groceries, or moving a chair? 5 4   In the past 7 days, how often have you been bothered by emotional problems such as feeling anxious, depressed, or irritable? 3 4   In the past 7 days, how would you rate your fatigue on average? 3 3   In the past 7 days, how would you rate your pain on average, where 0 means no pain, and 10 means worst imaginable pain? 5 2   Global Mental Health Score 12    12 11   Global Physical Health Score 16    16 16   PROMIS TOTAL - SUBSCORES 28    28 27     PROMIS 10-Global Health (only subscores and total score):       8/20/2024     8:23 PM 12/31/2024     7:18 AM   PROMIS-10 Scores Only   Global Mental Health Score 12    12 11   Global Physical Health Score 16    16 16   PROMIS TOTAL - SUBSCORES 28    28 27        Reason for Visit/Presenting Concern:  ADHD    Current Stressors / Issues:   MH update: pt changed the times he's taking his medication and has noticed and improvements in his attention. This has assisted him in his education. He's able to complete coursework more quickly. He will complete this program in 02/2026. He's learned that structure helps him be successful. 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 The paperwork has already started for him to change jobs. They were supportive of this change.   Stresses:  work  Appetite: unremarkable  Sleep: unremarkable  Therapy: through employer  LES: No  Preg: NA  Most important: medication update      Therapeutic Interventions:  Psycho-education: Provided psycho-education about patient's behavioral health condition and symptoms.    Response to treatment interventions:   Patient was receptive to interventions utilized.  Patient was engaged in the therapy process.       Progress on Treatment Objective(s) / Homework:   Satisfactory progress - ACTION (Actively working towards change); Intervened by reinforcing change plan / affirming steps taken     Medication Review:   No changes to current psychiatric medication(s)     Medication Compliance:   Yes     Chemical Use Review:  Substance Use: Chemical use reviewed, no active concerns identified      Tobacco Use: No current tobacco use.       Assessment: Current Emotional / Mental Status (status of significant symptoms):    Risk status (Self / Other harm or suicidal ideation)   Patient denies a history of suicidal ideation, suicide attempts, self-injurious behavior, homicidal ideation, homicidal behavior, and and other safety concerns   Patient denies current fears or concerns for personal safety.   Patient denies current or recent suicidal ideation or behaviors.   Patient denies current or recent homicidal ideation or behaviors.   Patient denies current or recent self injurious  behavior or ideation.   Patient denies other safety concerns.   Recommended that patient call 911 or go to the local ED should there be a change in any of these risk factors      ASSESSMENT:   Mental Status:     Appearance:   Appropriate    Eye Contact:   Good    Psychomotor Behavior: Normal    Attitude:   Cooperative    Orientation:   All   Speech Rate / Production: Normal    Volume:   Normal    Mood:    Normal   Affect:    Appropriate    Thought Content:  Clear    Thought Form:  Coherent  Logical    Insight:    Good          Diagnoses:   Data Unavailable    Collateral Reports Completed:   Collaborated with [facility]PS team        Plan: (Homework, other):   Patient was provided No indications of CD issues  Patient was given information about behavioral services and encouraged to schedule a follow up appointment with the clinic [facility] in 1 month.         Heavenly Flores [facility], [facility]     _____________________________________________________________________________________________________________________________________                                              Individual Treatment Plan    Patient's Name: Wilfrido Echavarria   YOB: 2000  Date of Creation: 11/12/2024  Date Treatment Plan Last Reviewed/Revised: 02/12/2025    DSM5 Diagnoses: Attention-Deficit/Hyperactivity Disorder  314.01 (F90.2) Combined presentation  Psychosocial / Contextual Factors: Occupational Issues  PROMIS (reviewed every 90 days):   The following assessments were completed by patient for this visit:  PROMIS 10-Global Health (all questions and answers displayed):       8/20/2024     8:23 PM 12/31/2024     7:18 AM   PROMIS 10   In general, would you say your health is: Excellent Good   In general, would you say your quality of life is: Very good Very good   In general, how would you rate your physical health? Excellent Excellent   In general, how would you rate your mental health, including your mood and your ability to think? Good Good   In  general, how would you rate your satisfaction with your social activities and relationships? Fair Fair   In general, please rate how well you carry out your usual social activities and roles Good Good   To what extent are you able to carry out your everyday physical activities such as walking, climbing stairs, carrying groceries, or moving a chair? Completely Mostly   In the past 7 days, how often have you been bothered by emotional problems such as feeling anxious, depressed, or irritable? Sometimes Often   In the past 7 days, how would you rate your fatigue on average? Moderate Moderate   In the past 7 days, how would you rate your pain on average, where 0 means no pain, and 10 means worst imaginable pain? 5 2   In general, would you say your health is: 5 3   In general, would you say your quality of life is: 4 4   In general, how would you rate your physical health? 5 5   In general, how would you rate your mental health, including your mood and your ability to think? 3 3   In general, how would you rate your satisfaction with your social activities and relationships? 2 2   In general, please rate how well you carry out your usual social activities and roles. (This includes activities at home, at work and in your community, and responsibilities as a parent, child, spouse, employee, friend, etc.) 3 3   To what extent are you able to carry out your everyday physical activities such as walking, climbing stairs, carrying groceries, or moving a chair? 5 4   In the past 7 days, how often have you been bothered by emotional problems such as feeling anxious, depressed, or irritable? 3 4   In the past 7 days, how would you rate your fatigue on average? 3 3   In the past 7 days, how would you rate your pain on average, where 0 means no pain, and 10 means worst imaginable pain? 5 2   Global Mental Health Score 12    12 11   Global Physical Health Score 16    16 16   PROMIS TOTAL - SUBSCORES 28    28 27     PROMIS 10-Global  "Health (only subscores and total score):       8/20/2024     8:23 PM 12/31/2024     7:18 AM   PROMIS-10 Scores Only   Global Mental Health Score 12    12 11   Global Physical Health Score 16    16 16   PROMIS TOTAL - SUBSCORES 28    28 27        Referral / Collaboration:  Collaborated with [facility] team .    Anticipated number of session for this episode of care:  6-9 sessions  Anticipation frequency of session: Monthly  Anticipated Duration of each session: 16-37 minutes  Treatment plan will be reviewed in 90 days or when goals have been changed.     [facility] will provide psycho-education on skills to manage ADHD including:  Goal Setting  Externalizing Cues (notes, calendars,alarms, white board)  Use of Planner  Environmental Modification (wear headphones to manage sensory overload or having an escape plan to remove self from overwhelming situations, use of headphones to assist with studying e.g)  Breaking Task to Smaller Parts .    MeasurableTreatment Goal(s) related to diagnosis / functional impairment(s)  Goal 1: Patient will  experience improved attention   \"I will know I've met my goal when if I'm able to focus on on thing at a time.\"     Objective #A (Patient Action)  Patient will  experience improved attention  Status: New - Date: 02/12/2025      Intervention(s)  [facility] will Psycho-education: Provide psycho-education about patient's behavioral health condition and symptoms.       Patient has reviewed and agreed to the above plan.     Written by  Hevaenly Flores [facility], [facility]       "

## 2025-04-02 ENCOUNTER — VIRTUAL VISIT (OUTPATIENT)
Dept: BEHAVIORAL HEALTH | Facility: CLINIC | Age: 25
End: 2025-04-02
Payer: COMMERCIAL

## 2025-04-02 ENCOUNTER — VIRTUAL VISIT (OUTPATIENT)
Dept: PSYCHIATRY | Facility: CLINIC | Age: 25
End: 2025-04-02
Payer: COMMERCIAL

## 2025-04-02 DIAGNOSIS — F90.2 ATTENTION DEFICIT HYPERACTIVITY DISORDER (ADHD), COMBINED TYPE: Primary | ICD-10-CM

## 2025-04-02 DIAGNOSIS — F33.1 MDD (MAJOR DEPRESSIVE DISORDER), RECURRENT EPISODE, MODERATE (H): ICD-10-CM

## 2025-04-02 DIAGNOSIS — F41.1 GAD (GENERALIZED ANXIETY DISORDER): ICD-10-CM

## 2025-04-02 DIAGNOSIS — F90.9 ATTENTION DEFICIT HYPERACTIVITY DISORDER (ADHD), UNSPECIFIED ADHD TYPE: ICD-10-CM

## 2025-04-02 PROCEDURE — 99207 PR NO BILLABLE SERVICE THIS VISIT: CPT | Mod: 95 | Performed by: SOCIAL WORKER

## 2025-04-02 RX ORDER — GUANFACINE 4 MG/1
4 TABLET, EXTENDED RELEASE ORAL AT BEDTIME
Qty: 30 TABLET | Refills: 2 | Status: SHIPPED | OUTPATIENT
Start: 2025-04-02

## 2025-04-02 RX ORDER — DEXTROAMPHETAMINE SACCHARATE, AMPHETAMINE ASPARTATE, DEXTROAMPHETAMINE SULFATE AND AMPHETAMINE SULFATE 2.5; 2.5; 2.5; 2.5 MG/1; MG/1; MG/1; MG/1
10 TABLET ORAL DAILY
Qty: 30 TABLET | Refills: 0 | Status: SHIPPED | OUTPATIENT
Start: 2025-04-21 | End: 2025-05-21

## 2025-04-02 RX ORDER — DEXTROAMPHETAMINE SACCHARATE, AMPHETAMINE ASPARTATE MONOHYDRATE, DEXTROAMPHETAMINE SULFATE AND AMPHETAMINE SULFATE 6.25; 6.25; 6.25; 6.25 MG/1; MG/1; MG/1; MG/1
25 CAPSULE, EXTENDED RELEASE ORAL DAILY
Qty: 30 CAPSULE | Refills: 0 | Status: SHIPPED | OUTPATIENT
Start: 2025-06-04 | End: 2025-07-04

## 2025-04-02 RX ORDER — DEXTROAMPHETAMINE SACCHARATE, AMPHETAMINE ASPARTATE MONOHYDRATE, DEXTROAMPHETAMINE SULFATE AND AMPHETAMINE SULFATE 6.25; 6.25; 6.25; 6.25 MG/1; MG/1; MG/1; MG/1
25 CAPSULE, EXTENDED RELEASE ORAL DAILY
Qty: 30 CAPSULE | Refills: 0 | Status: SHIPPED | OUTPATIENT
Start: 2025-04-09 | End: 2025-05-09

## 2025-04-02 RX ORDER — DEXTROAMPHETAMINE SACCHARATE, AMPHETAMINE ASPARTATE MONOHYDRATE, DEXTROAMPHETAMINE SULFATE AND AMPHETAMINE SULFATE 6.25; 6.25; 6.25; 6.25 MG/1; MG/1; MG/1; MG/1
25 CAPSULE, EXTENDED RELEASE ORAL DAILY
Qty: 30 CAPSULE | Refills: 0 | Status: SHIPPED | OUTPATIENT
Start: 2025-05-07 | End: 2025-06-06

## 2025-04-02 RX ORDER — DEXTROAMPHETAMINE SACCHARATE, AMPHETAMINE ASPARTATE, DEXTROAMPHETAMINE SULFATE AND AMPHETAMINE SULFATE 2.5; 2.5; 2.5; 2.5 MG/1; MG/1; MG/1; MG/1
10 TABLET ORAL DAILY
Qty: 30 TABLET | Refills: 0 | Status: SHIPPED | OUTPATIENT
Start: 2025-05-19 | End: 2025-06-18

## 2025-04-02 RX ORDER — DEXTROAMPHETAMINE SACCHARATE, AMPHETAMINE ASPARTATE, DEXTROAMPHETAMINE SULFATE AND AMPHETAMINE SULFATE 2.5; 2.5; 2.5; 2.5 MG/1; MG/1; MG/1; MG/1
10 TABLET ORAL DAILY
Qty: 30 TABLET | Refills: 0 | Status: SHIPPED | OUTPATIENT
Start: 2025-06-16 | End: 2025-07-16

## 2025-04-02 ASSESSMENT — PAIN SCALES - GENERAL: PAINLEVEL_OUTOF10: MODERATE PAIN (4)

## 2025-04-02 NOTE — PATIENT INSTRUCTIONS
Plan:  -Increase guanfacine ER to 4mg nightly  -Continue Adderall XR to 25mg QAM and Adderall IR 10mg in the afternoon (TDD: 35mg)    **For crisis resources, please see the information at the end of this document**   Patient Education    Thank you for coming to the [facility].     Lab Testing:  If you had lab testing today and your results are reassuring or normal they will be mailed to you or sent through [employer] within 7 days. If the lab tests need quick action we will call you with the results. The phone number we will call with results is # [phone]. If this is not the best number please call our clinic and change the number.     Medication Refills:  If you need any refills please call your pharmacy and they will contact us.   Three business days of notice are needed for general medication refill requests.   Five business days of notice are needed for controlled substance refill requests.   If you need to change to a different pharmacy, please contact the new pharmacy directly. The new pharmacy will help you get your medications transferred.     Contact Us:  Please call [phone] during business hours (8-5:00 M-F).   Financial Assistance [phone]   Medical Records [phone]       MENTAL HEALTH CRISIS RESOURCES:  For a emergency help, please call 911 or go to the nearest Emergency Department.     Emergency Walk-In Options:   Em[facility] Unit @ [facility] ([place]): [phone] - Specialized mental health emergency area designed to be calming  [facility] ([place]): [phone]  [facility] ([place]): [phone]  [facility]): [phone]    County Crisis Information:   [place]: [phone]  [place]: [phone]  [place] ([place]) - Adult: [phone]     Child: [phone]  [place] - Adult: [phone]     Child: [phone]  [place]: [phone]  List of all [facility]  resources:   https://[place].gov/dhs/people-we-serve/adults/health-care/mental-health/resources/crisis-contacts.jsp    National Crisis Information:   Crisis Text Line: Text  [place]  to 005208  Suicide & Crisis Lifeline: 988  National Suicide Prevention Lifeline: 4-068-103-TALK (2-206-605-4145)       For online chat options, visit https://suicidepreventionlifeline.org/chat/  Poison Control Center: 7-746-021-6262  Trans Lifeline: 8-355-882-9135 - Hotline for transgender people of all ages  The [employer] Project: 7-393-984-3048 - Hotline for LGBT youth     For Non-Emergency Support:   Fast Tracker: Mental Health & Substance Use Disorder Resources -   https://www.[employer]n.org/

## 2025-04-02 NOTE — NURSING NOTE
Is the patient currently in the state of [place]? YES    Current patient location: [facility] [address] [place] [address]    Visit mode:Video    If the visit is dropped, the patient can be reconnected by: VIDEO VISIT:  Send e-mail to at [email or web address]."[email or web address]    Will anyone else be joining the visit? No  (If patient encounters technical issues they should call [phone])    Are changes needed to the allergy or medication list? Pt stated no changes to allergies and Pt stated no med changes    Are refills needed on medications prescribed by this physician? No    Rooming Documentation: Questionnaire(s) completed.    Reason for visit: JAISON Quiles, VVF

## 2025-04-02 NOTE — PROGRESS NOTES
[facility]  Collaborative Care Psychiatry Service ([facility])  MEDICAL PROGRESS NOTE     Wilfrido Echavarria is a 24 year old adult who prefers the name Wilfrido.     Initial consultation on 08/21/24.      CARE TEAM:   PCP- Yudith Retana  Therapist- None              Assessment & Plan     History and interview support the following diagnoses:   MDD  GURMEET  ADHD    Wilfrido is a 24-year-old adult with history of depression, anxiety, and ADHD who presents for psychiatric follow-up with [facility].     Wilfrido was last seen 03/11/25 at which time Adderall XR was increased to 25mg every morning and IR 5mg BID was continued. Today, Wilfrido reports good benefit from higher stimulant dose and denies ASE. He is taking Adderall XR 25mg QAM + IR 10mg in the afternoon and this regimen is covering him through his longer days of work and studying. He reports good benefit from guanfacine ER 3mg and asks to increase dose to 4mg to further help with sleep and feelings of restlessness throughout the day. He denies dizziness from current guanfacine dose and notes that fatigue tends to worsen for the 1-2 week period following dose increases. We will trial guanfacine 4mg nightly and Wilfrido knows to contact clinic should any concerns arise with the higher dose.     Assessment from initial consultation on 08/21/24:  Patient reports several different concerns today. He reports history of increased anger and difficulty controlling his emotions. He has no history of physical aggression or violence towards others however at times he is verbally reactive and says things he later on regrets. This has impacted his relationships and his quality of life. He doesn't feel that depression is a current concern. Anxiety, impulsivity, and feeling tense and always on edge tend to be the symptoms that are most disruptive to his life. He does carry a diagnosis of ADHD and has only ever been on  amphetamine based stimulants. He does feel that Adderall helps him fidget less but continues to have a hard time with focus and staying on track, especially when reading or attempting to study.    Patient's history is significant for notable trauma. He endorses regular trauma-related nightmares that can disrupt sleep at times. In general he feels that he is getting enough sleep since implemented a consistent sleep schedule. He carries genetic loading for bipolar disorder however based on history and assessment, he does not present with symptoms consistent with hypo/ade.     Patient reports symptoms of periodic tachycardia at rest based on alerts he receives from his watch. He also notes periodic orthostasis. Given these symptoms, I've asked him to complete an updated EKG through his PCP before we change his medication regimen. If symptoms of tachycardia persist or worsen, he was instructed to hold his Adderall dose and seek urgent medical care.     Future considerations:  -We discussed use of clonidine for behavioral dysregulation related to autonomic overactivation which may have some benefit for nightmares and concentration. Prone to orthostasis, must be titrated slowly. Once he repeats an EKG we will first focus on starting this medication as it will help target anxiety/overactivation/trauma nightmares/concentration. He also reports a history of tics, worsened in the setting of anxiety/started before stimulant use, which clonidine may also help treat.   -Switching from Adderall to methylphenidate which may somewhat reduce the risk of HTN and tachycardia. May also contribute less to agitation/overactivation.  -Consider use of selective serotonin reuptake inhibitor (Lexapro could be a good option) to target anxiety/mood symptoms.     Psychotherapy discussion: Primary recommendation for the treatment of mood symptoms, especially anxiety. Supportive therapy can be useful for reducing the impact of acute or chronic  psychosocial stressors. CBT can be particularly helpful for ruminative thinking and addressing maladaptive coping mechanisms that may worsen anxiety.     PSYCHOTROPIC DRUG INTERACTIONS: **Italicized interactions are for treatment plan options not currently implemented**  none    MANAGEMENT:  N/A    [place] was [place] today: Indicates controlled medications refilled as prescribed                Plan    1) PSYCHOTROPIC MEDICATION RECOMMENDATIONS:  -Increase guanfacine ER to 4mg nightly  -Continue Adderall XR to 25mg QAM and Adderall IR 10mg in the afternoon (TDD: 35mg)    2) THERAPY: None    3) NEXT DUE:   Labs- AST elevated slightly (107) on 10/19/24. All other hepatic labs WNL. AST normalized on repeat testing 01/25/25.   ECG- EKG completed 10/21/24 and WNL per cardiology review.    4) REFERRALS / COORDINATION: None    5) DISPOSITION:   - Pt would benefit from brief psychiatric intervention (up to ~5 visits) to adjust meds and gauge for benefit.   - Follow up with SELMA Centeno CNP in 8-12 weeks. Plan to transition med management back to designated prescriber after brief care is complete.   - If not seen for 6 months, follow up and prescribing will automatically revert back to referring provider / PCP.     Treatment Risk Statement:  The patient understands the risks, benefits, adverse effects and alternatives. Agrees to treatment with the capacity to do so. No medical contraindications to treatment. Agrees to contact provider for any problems. The patient understands to call 911 or go to the nearest ED if urgent or life threatening symptoms occur. Crisis contact numbers are provided routinely in the After Visit Summary.    Suicide Risk Assessment: Wilfrido did not appear to be an imminent safety risk to self or others.    PROVIDER:  SELMA Centeno CNP    The [facility] psychiatry providers act as a specialty service for Primary Care Providers in the [facility] who seek to optimize medications for  "unstable patients. Once medications have been optimized, [facility] providers discharge the patient back to the referring Primary Care Provider for ongoing medication management. Care team has reviewed attendance agreement with patient. Patient advised that two failed appointments within 6 months may lead to termination of current episode of care.     Patient Status:  Patient will continue to be seen for ongoing consultation and stabilization.              Interim History    Per [facility] Heavenly Flores during today's team-based visit: \"pt changed the times he's taking his medication and has noticed and improvements in his attention. This has assisted him in his education. He's able to complete coursework more quickly. He will complete this program in 02/2026. He's learned that structure helps him be successful. 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 The paperwork has already started for him to change jobs. They were supportive of this change.   Stresses:  work  Appetite: unremarkable  Sleep: unremarkable  Therapy: through employer  LES: No  Preg: NA  Most important: medication update\"    Wilfrido was last seen 03/11/25 at which time Adderall XR was increased to 25mg every morning and IR 5mg BID was continued. Since the last visit:  -Wilfrido reports good benefit from the higher dose of Adderall. Takes Adderall XR around 6am and then takes IR 10mg around 2-3p (no longer taking IR 5mg BID). He's been able to study in the evening. Denies medication ASE.   -Sleeping well, reports improvement in anxiety/agitation. Stressors include school, work, finances, wedding planning. Anxiety has been manageable; started [facility] within the past few weeks. He also gets 25 free individual therapy sessions through his fiancee's work.   -He had a recent eye exam; using the light filter glasses- this has helped with reading and has lessened eye strain. Also using lubricant drops which have " been beneficial.    Medication ASE: Denies; experiences fatigue for 1-2 weeks following guanfacine increase which then resolves.   Medication adherence: No concerns    Recent Psych Symptoms:   Depression:  intermittent low energy, amotivation, isolation, withdrawn   Elevated:  none  Psychosis:  none  Anxiety:   Social anxiety, increased rumination  Trauma Related:  nightmares  Insomnia:  No  Other:  No    Pertinent Negatives: No suicidal or violent ideation, psychosis, or hypo/ade.      Pertinent Social Hx:  FINANCIAL SUPPORT- Working FT in IT. Finances are a stressor.   LIVING SITUATION / RELATIONSHIPS- Lives with ailyn. Feels safe at home.    SOCIAL/ SPIRITUAL SUPPORT- Mother, linhancee    Pertinent Substance Use  Alcohol- None  Nicotine- None  Caffeine- 1 Diet Mountain Dew daily. Diet Pepsi at lunch. No caffeine after 3pm.   Opioids- None  Narcan Kit- N/A  THC/CBD- None  Other Illicit Drugs- none              Medical Review of Systems   Dizziness/orthostasis- Denies dizziness. Notes increased HR on standing. Denies syncope or pre-syncope.   Respiratory- Denies hx of asthma. No inhaler use ever.   Cardiovascular- Reports periods of increased heart rate at rest (up to 120s), has had several instances of this over the last few months.   Gastrointestinal- Endorses loose stools x3-4 times per week.   Tics, tremors- Hx of vocal tics/motor tics. Eye blinking daily - increases with anxiety.  Sexual health concerns- None  A comprehensive review of systems was performed and is negative other than noted above.    Denies family hx of sudden cardiac death or MI. Endorses family hx of HTN in siblings.               Psych Summary Points  08/2024: Initial consultation 08/21/24; no medication changes. EKG ordered due to report of tachycardia in the context of stimulant use.   09/2024: Started guanfacine ER 1mg at bedtime for ADHD/anxiety.   11/2024: Increased guanfacine ER to 2mg at bedtime.   12/2024: Wilfrido ran out of  guanfacine about 10 days ago. Restarted at 1mg at bedtime x7 days, then back to 2mg nightly.   02/2025: Increased guanfacine ER to 3mg nightly. Changed Adderall IR 20mg QAM + 10mg every afternoon to XR 20mg QAM + IR 5mg BID for extended daytime coverage.   03/2025: Increased morning dose of Adderall XR from 20mg to 25mg daily for additional support while in school + working.  04/2025: Increased guanfacine ER to 4mg nightly to target anxiety/hyperarousal/sleep.               Past Psychotropic Medications     Medication Max Dose (mg) Dates / Duration Helpful? DC Reason / Adverse Effects?   Wellbutrin       trazodone   N    sertraline   N Took [place]orota year of high school to freshman year of HS.    Adderall XR    ineffective   Strattera   N Rashes, nausea      No history of methylphenidate products              Past Medical History     Medication allergies:    Allergies   Allergen Reactions    Strattera [Atomoxetine] Other (See Comments)     Causing chest pain with AM dose and mind fogginess in PM dose       Neurologic Hx [head injury, seizures, etc.]: Denies hx of seizure. Endorses suspected hx of concussion but unable to recall whether this was ever formally diagnosed.  Patient Active Problem List   Diagnosis    Attention deficit hyperactivity disorder (ADHD), unspecified ADHD type    Acne    Hypertrophy of breast    Sciatica without back pain, unspecified laterality    Family history of hypertension    Piriformis syndrome, right    Elevated fasting glucose     Past Medical History:   Diagnosis Date    ADHD (attention deficit hyperactivity disorder)     Anxiety     Depression                  Medications   Current Outpatient Medications   Medication Sig Dispense Refill    amphetamine-dextroamphetamine (ADDERALL XR) 25 MG 24 hr capsule Take 1 capsule (25 mg) by mouth every morning. 30 capsule 0    amphetamine-dextroamphetamine (ADDERALL) 10 MG tablet Take 0.5 tablets (5 mg) by mouth 2 times daily. 30 tablet 0     guanFACINE HCl (INTUNIV) 3 MG TB24 24 hr tablet Take 1 tablet (3 mg) by mouth at bedtime. 30 tablet 2                 Physical Exam  (Vitals Only)  There were no vitals taken for this visit.    Pulse Readings from Last 5 Encounters:   06/03/24 76   03/05/24 84   09/07/23 63   05/25/23 82   02/10/23 74     Wt Readings from Last 5 Encounters:   03/11/25 90.7 kg (200 lb)   08/21/24 87.5 kg (193 lb)   06/03/24 90.3 kg (199 lb)   03/05/24 82.7 kg (182 lb 6.4 oz)   09/07/23 95.1 kg (209 lb 9.6 oz)     BP Readings from Last 5 Encounters:   06/03/24 120/54   03/05/24 132/84   09/07/23 124/76   05/25/23 128/72   02/10/23 112/68                 Mental Status Exam  Alertness: alert  and oriented  Appearance: adequately groomed  Behavior/Demeanor: cooperative, pleasant, and calm, with good eye contact   Speech: normal and regular rate and rhythm  Language: intact and no problems  Psychomotor: normal or unremarkable  Mood: description consistent with euthymia  Affect: full range; was congruent to mood  Thought Process/Associations: unremarkable  Thought Content:  Reports none;  Denies suicidal ideation, violent ideation, and delusions  Perception:  Reports none;  Denies auditory hallucinations and visual hallucinations  Insight: intact  Judgment: adequate for safety and intact  Cognition: does  appear grossly intact; formal cognitive testing was not done  oriented: time, person, and place  Gait and Station:  N/A - telehealth                Data       8/20/2024     8:23 PM 12/31/2024     7:18 AM 4/2/2025     9:25 AM   PROMIS-10 Total Score w/o Sub Scores   PROMIS TOTAL - SUBSCORES 28    28 27 32    32         8/20/2024     8:23 PM   CAGE-AID Total Score   Total Score 0    0   Total Score MyChart 0 (A total score of 2 or greater is considered clinically significant)         8/20/2024     8:07 PM 9/24/2024     1:33 PM 2/5/2025    10:15 AM   PHQ   PHQ-9 Total Score 2    2 12 5    Q9: Thoughts of better off dead/self-harm past 2  weeks Not at all Not at all Not at all       Patient-reported         8/30/2019     3:47 PM 9/24/2024     1:34 PM 2/5/2025    10:16 AM   GURMEET-7 SCORE   Total Score 2 (minimal anxiety)  6 (mild anxiety) 5 (mild anxiety)   Total Score 2 6 5        Patient-reported    Proxy-reported       Liver/kidney function Metabolic Blood counts   Recent Labs   Lab Test 01/25/25  1341 10/19/24  1429 04/30/18  1553   CR  --  1.17 1.15*   AST 32 107* 27   ALT 32 46 33   ALKPHOS  --  80 131    Recent Labs   Lab Test 10/19/24  1429 06/13/24  1020 09/07/23  0715   CHOL  --  158  --    TRIG  --  29  --    LDL  --  83  --    HDL  --  69  --    A1C  --   --  5.1   TSH 1.28  --  2.00    Recent Labs   Lab Test 12/09/23  1506   WBC 5.4   HGB 15.7   HCT 44.3   MCV 86           ECG results from 10/21/24   EKG 12-lead, tracing only     Value    Systolic Blood Pressure     Diastolic Blood Pressure     Ventricular Rate 73    Atrial Rate 73    OK Interval 142    QRS Duration 110        QTc 429    P Axis 61    R AXIS 83    T Axis 60    Interpretation ECG      Sinus rhythm with sinus arrhythmia  Normal ECG    Confirmed by MD TROY, AIDA ([id number]) on 10/22/2024 5:20:24 PM         Administrative Billing:     Level of Medical Decision Making:   - At least 1 chronic problem that is not stable  - Engaged in prescription drug management during visit (discussed any medication benefits, side effects, alternatives, etc.)    The longitudinal plan of care for the diagnosis(es)/condition(s) as documented above were addressed during this visit. Due to the added complexity in care, I will continue to support Wilfrido in the subsequent management and with ongoing continuity of care.

## 2025-04-05 ENCOUNTER — HEALTH MAINTENANCE LETTER (OUTPATIENT)
Age: 25
End: 2025-04-05

## 2025-05-07 ENCOUNTER — TRANSFERRED RECORDS (OUTPATIENT)
Dept: HEALTH INFORMATION MANAGEMENT | Facility: CLINIC | Age: 25
End: 2025-05-07
Payer: COMMERCIAL

## 2025-05-14 ENCOUNTER — TRANSFERRED RECORDS (OUTPATIENT)
Dept: HEALTH INFORMATION MANAGEMENT | Facility: CLINIC | Age: 25
End: 2025-05-14
Payer: COMMERCIAL